# Patient Record
Sex: FEMALE | Race: WHITE | Employment: OTHER | URBAN - METROPOLITAN AREA
[De-identification: names, ages, dates, MRNs, and addresses within clinical notes are randomized per-mention and may not be internally consistent; named-entity substitution may affect disease eponyms.]

---

## 2017-08-16 ENCOUNTER — HOSPITAL ENCOUNTER (INPATIENT)
Age: 77
LOS: 26 days | Discharge: NURSING, REHAB OR LONG-TERM CARE | DRG: 025 | End: 2017-09-11
Attending: EMERGENCY MEDICINE | Admitting: INTERNAL MEDICINE
Payer: MEDICARE

## 2017-08-16 ENCOUNTER — APPOINTMENT (OUTPATIENT)
Dept: GENERAL RADIOLOGY | Age: 77
DRG: 025 | End: 2017-08-16
Payer: MEDICARE

## 2017-08-16 ENCOUNTER — APPOINTMENT (OUTPATIENT)
Dept: CT IMAGING | Age: 77
DRG: 025 | End: 2017-08-16
Payer: MEDICARE

## 2017-08-16 DIAGNOSIS — S06.5XAA SUBDURAL HEMATOMA: Primary | ICD-10-CM

## 2017-08-16 LAB
ACT TEG: 97 SEC (ref 86–118)
ALLEN TEST: ABNORMAL
ANGLE, RAPID TEG: 80.5 DEG (ref 64–80)
ANION GAP SERPL CALCULATED.3IONS-SCNC: 13 MMOL/L (ref 9–17)
BLOOD BANK SPECIMEN: ABNORMAL
BUN BLDV-MCNC: 20 MG/DL (ref 8–23)
CARBOXYHEMOGLOBIN: 0.9 % (ref 0–5)
CHLORIDE BLD-SCNC: 97 MMOL/L (ref 98–107)
CO2: 26 MMOL/L (ref 20–31)
COLLAGEN ADENOSINE-5'-DIPHOSPHATE (ADP) TIME: 109 SEC (ref 67–112)
COLLAGEN EPINEPHRINE TIME: 152 SEC (ref 85–172)
CREAT SERPL-MCNC: 0.91 MG/DL (ref 0.5–0.9)
EPL-TEG: 0 % (ref 0–15)
ETHANOL PERCENT: <0.01 %
ETHANOL: <10 MG/DL
FIO2: ABNORMAL
GFR AFRICAN AMERICAN: >60 ML/MIN
GFR NON-AFRICAN AMERICAN: >60 ML/MIN
GFR SERPL CREATININE-BSD FRML MDRD: ABNORMAL ML/MIN/{1.73_M2}
GFR SERPL CREATININE-BSD FRML MDRD: ABNORMAL ML/MIN/{1.73_M2}
GLUCOSE BLD-MCNC: 131 MG/DL (ref 70–99)
HCO3 VENOUS: 26.9 MMOL/L (ref 24–30)
HCT VFR BLD CALC: 35.3 % (ref 36–46)
HEMOGLOBIN: 11.8 G/DL (ref 12–16)
HEPARIN THERAPY: ABNORMAL
INR BLD: 1.1
KINETICS RAPID TEG: 0.8 MIN (ref 1–2)
LY30 (LYSIS) TEG: 0 % (ref 0–8)
MA(MAX CLOT) RAPID TEG: 76.5 MM (ref 52–71)
MCH RBC QN AUTO: 32.2 PG (ref 26–34)
MCHC RBC AUTO-ENTMCNC: 33.5 G/DL (ref 31–37)
MCV RBC AUTO: 96 FL (ref 80–100)
METHEMOGLOBIN: ABNORMAL % (ref 0–1.5)
MODE: ABNORMAL
NEGATIVE BASE EXCESS, VEN: ABNORMAL MMOL/L (ref 0–2)
NOTIFICATION TIME: ABNORMAL
NOTIFICATION: ABNORMAL
O2 DEVICE/FLOW/%: ABNORMAL
O2 SAT, VEN: 43.1 % (ref 60–85)
OXYHEMOGLOBIN: ABNORMAL % (ref 95–98)
PARTIAL THROMBOPLASTIN TIME: 25.4 SEC (ref 21.3–31.3)
PATIENT TEMP: 37
PCO2, VEN, TEMP ADJ: ABNORMAL MMHG (ref 39–55)
PCO2, VEN: 45.3 (ref 39–55)
PDW BLD-RTO: 16.5 % (ref 12.5–15.4)
PEEP/CPAP: ABNORMAL
PH VENOUS: 7.39 (ref 7.32–7.42)
PH, VEN, TEMP ADJ: ABNORMAL (ref 7.32–7.42)
PLATELET # BLD: 345 K/UL (ref 140–450)
PLATELET FUNCTION INTERP: NORMAL
PMV BLD AUTO: 9.1 FL (ref 6–12)
PO2, VEN, TEMP ADJ: ABNORMAL MMHG (ref 30–50)
PO2, VEN: 24.1 (ref 30–50)
POSITIVE BASE EXCESS, VEN: 2.1 MMOL/L (ref 0–2)
POTASSIUM SERPL-SCNC: 4.4 MMOL/L (ref 3.7–5.3)
PROTHROMBIN TIME: 11.5 SEC (ref 9.4–12.6)
PSV: ABNORMAL
PT. POSITION: ABNORMAL
RBC # BLD: 3.68 M/UL (ref 4–5.2)
REACTION TIME RAPID TEG: 0.5 MIN (ref 0–1)
RESPIRATORY RATE: ABNORMAL
SAMPLE SITE: ABNORMAL
SET RATE: ABNORMAL
SODIUM BLD-SCNC: 136 MMOL/L (ref 135–144)
TEG COMMENT: ABNORMAL
TEXT FOR RESPIRATORY: ABNORMAL
TOTAL HB: ABNORMAL G/DL (ref 12–16)
TOTAL RATE: ABNORMAL
VT: ABNORMAL
WBC # BLD: 14.1 K/UL (ref 3.5–11)

## 2017-08-16 PROCEDURE — 85210 CLOT FACTOR II PROTHROM SPEC: CPT

## 2017-08-16 PROCEDURE — 85610 PROTHROMBIN TIME: CPT

## 2017-08-16 PROCEDURE — C9132 KCENTRA, PER I.U.: HCPCS | Performed by: EMERGENCY MEDICINE

## 2017-08-16 PROCEDURE — 85576 BLOOD PLATELET AGGREGATION: CPT

## 2017-08-16 PROCEDURE — 85390 FIBRINOLYSINS SCREEN I&R: CPT

## 2017-08-16 PROCEDURE — 82805 BLOOD GASES W/O2 SATURATION: CPT

## 2017-08-16 PROCEDURE — 72128 CT CHEST SPINE W/O DYE: CPT

## 2017-08-16 PROCEDURE — 6360000002 HC RX W HCPCS: Performed by: EMERGENCY MEDICINE

## 2017-08-16 PROCEDURE — 86920 COMPATIBILITY TEST SPIN: CPT

## 2017-08-16 PROCEDURE — 72170 X-RAY EXAM OF PELVIS: CPT

## 2017-08-16 PROCEDURE — 2580000003 HC RX 258: Performed by: EMERGENCY MEDICINE

## 2017-08-16 PROCEDURE — 30283B1 TRANSFUSION OF NONAUTOLOGOUS 4-FACTOR PROTHROMBIN COMPLEX CONCENTRATE INTO VEIN, PERCUTANEOUS APPROACH: ICD-10-PCS | Performed by: PSYCHIATRY & NEUROLOGY

## 2017-08-16 PROCEDURE — 6360000002 HC RX W HCPCS: Performed by: STUDENT IN AN ORGANIZED HEALTH CARE EDUCATION/TRAINING PROGRAM

## 2017-08-16 PROCEDURE — 82947 ASSAY GLUCOSE BLOOD QUANT: CPT

## 2017-08-16 PROCEDURE — 86900 BLOOD TYPING SEROLOGIC ABO: CPT

## 2017-08-16 PROCEDURE — G0480 DRUG TEST DEF 1-7 CLASSES: HCPCS

## 2017-08-16 PROCEDURE — 2000000003 HC NEURO ICU R&B

## 2017-08-16 PROCEDURE — 85027 COMPLETE CBC AUTOMATED: CPT

## 2017-08-16 PROCEDURE — 86901 BLOOD TYPING SEROLOGIC RH(D): CPT

## 2017-08-16 PROCEDURE — 85730 THROMBOPLASTIN TIME PARTIAL: CPT

## 2017-08-16 PROCEDURE — 80051 ELECTROLYTE PANEL: CPT

## 2017-08-16 PROCEDURE — 72131 CT LUMBAR SPINE W/O DYE: CPT

## 2017-08-16 PROCEDURE — 2500000003 HC RX 250 WO HCPCS

## 2017-08-16 PROCEDURE — 84520 ASSAY OF UREA NITROGEN: CPT

## 2017-08-16 PROCEDURE — 99291 CRITICAL CARE FIRST HOUR: CPT

## 2017-08-16 PROCEDURE — 71010 XR CHEST PORTABLE: CPT

## 2017-08-16 PROCEDURE — 82565 ASSAY OF CREATININE: CPT

## 2017-08-16 PROCEDURE — 86850 RBC ANTIBODY SCREEN: CPT

## 2017-08-16 RX ORDER — SODIUM CHLORIDE 0.9 % (FLUSH) 0.9 %
10 SYRINGE (ML) INJECTION EVERY 12 HOURS SCHEDULED
Status: DISCONTINUED | OUTPATIENT
Start: 2017-08-16 | End: 2017-08-17

## 2017-08-16 RX ORDER — ZAFIRLUKAST 20 MG/1
20 TABLET, FILM COATED ORAL 2 TIMES DAILY
COMMUNITY
End: 2021-09-29

## 2017-08-16 RX ORDER — FENTANYL CITRATE 50 UG/ML
25 INJECTION, SOLUTION INTRAMUSCULAR; INTRAVENOUS ONCE
Status: COMPLETED | OUTPATIENT
Start: 2017-08-16 | End: 2017-08-16

## 2017-08-16 RX ORDER — FENTANYL CITRATE 50 UG/ML
25 INJECTION, SOLUTION INTRAMUSCULAR; INTRAVENOUS
Status: DISCONTINUED | OUTPATIENT
Start: 2017-08-16 | End: 2017-08-17

## 2017-08-16 RX ORDER — ONDANSETRON 2 MG/ML
4 INJECTION INTRAMUSCULAR; INTRAVENOUS EVERY 6 HOURS PRN
Status: DISCONTINUED | OUTPATIENT
Start: 2017-08-16 | End: 2017-09-11 | Stop reason: HOSPADM

## 2017-08-16 RX ORDER — CETIRIZINE HYDROCHLORIDE 10 MG/1
10 TABLET ORAL DAILY
COMMUNITY
End: 2017-09-27

## 2017-08-16 RX ORDER — ACETAMINOPHEN 325 MG/1
650 TABLET ORAL EVERY 4 HOURS PRN
Status: DISCONTINUED | OUTPATIENT
Start: 2017-08-16 | End: 2017-08-22

## 2017-08-16 RX ORDER — SODIUM CHLORIDE 0.9 % (FLUSH) 0.9 %
10 SYRINGE (ML) INJECTION PRN
Status: DISCONTINUED | OUTPATIENT
Start: 2017-08-16 | End: 2017-09-11 | Stop reason: HOSPADM

## 2017-08-16 RX ADMIN — PROTHROMBIN, COAGULATION FACTOR VII HUMAN, COAGULATION FACTOR IX HUMAN, COAGULATION FACTOR X HUMAN, PROTEIN C, PROTEIN S HUMAN, AND WATER 3410 UNITS: KIT at 21:28

## 2017-08-16 RX ADMIN — FENTANYL CITRATE 25 MCG: 50 INJECTION INTRAMUSCULAR; INTRAVENOUS at 21:12

## 2017-08-16 RX ADMIN — LEVETIRACETAM 1000 MG: 500 INJECTION, SOLUTION, CONCENTRATE INTRAVENOUS at 21:29

## 2017-08-16 ASSESSMENT — PAIN SCALES - GENERAL: PAINLEVEL_OUTOF10: 8

## 2017-08-17 ENCOUNTER — APPOINTMENT (OUTPATIENT)
Dept: CT IMAGING | Age: 77
DRG: 025 | End: 2017-08-17
Payer: MEDICARE

## 2017-08-17 ENCOUNTER — ANESTHESIA (OUTPATIENT)
Dept: OPERATING ROOM | Age: 77
DRG: 025 | End: 2017-08-17
Payer: MEDICARE

## 2017-08-17 ENCOUNTER — APPOINTMENT (OUTPATIENT)
Dept: GENERAL RADIOLOGY | Age: 77
DRG: 025 | End: 2017-08-17
Payer: MEDICARE

## 2017-08-17 ENCOUNTER — ANESTHESIA EVENT (OUTPATIENT)
Dept: OPERATING ROOM | Age: 77
DRG: 025 | End: 2017-08-17
Payer: MEDICARE

## 2017-08-17 VITALS — OXYGEN SATURATION: 78 % | TEMPERATURE: 99.2 F | SYSTOLIC BLOOD PRESSURE: 145 MMHG | DIASTOLIC BLOOD PRESSURE: 75 MMHG

## 2017-08-17 PROBLEM — M51.27 PROTRUSION OF INTERVERTEBRAL DISC OF LUMBOSACRAL REGION: Status: ACTIVE | Noted: 2017-08-17

## 2017-08-17 PROBLEM — S06.5XAA SUBDURAL HEMATOMA, ACUTE (HCC): Status: ACTIVE | Noted: 2017-08-17

## 2017-08-17 LAB
ABSOLUTE EOS #: 0 K/UL (ref 0–0.4)
ABSOLUTE LYMPH #: 1.3 K/UL (ref 1–4.8)
ABSOLUTE MONO #: 0.9 K/UL (ref 0.1–1.2)
ALBUMIN SERPL-MCNC: 3.4 G/DL (ref 3.5–5.2)
ALBUMIN/GLOBULIN RATIO: 1 (ref 1–2.5)
ALLEN TEST: POSITIVE
ALP BLD-CCNC: 90 U/L (ref 35–104)
ALT SERPL-CCNC: 14 U/L (ref 5–33)
ANGLE TEG: 76.8 DEG (ref 53–72)
ANION GAP SERPL CALCULATED.3IONS-SCNC: 16 MMOL/L (ref 9–17)
ANION GAP SERPL CALCULATED.3IONS-SCNC: 18 MMOL/L (ref 9–17)
AST SERPL-CCNC: 19 U/L
BASOPHILS # BLD: 0 %
BASOPHILS ABSOLUTE: 0 K/UL (ref 0–0.2)
BILIRUB SERPL-MCNC: 0.36 MG/DL (ref 0.3–1.2)
BLD PROD TYP BPU: NORMAL
BUN BLDV-MCNC: 14 MG/DL (ref 8–23)
BUN BLDV-MCNC: 17 MG/DL (ref 8–23)
BUN/CREAT BLD: ABNORMAL (ref 9–20)
BUN/CREAT BLD: ABNORMAL (ref 9–20)
CALCIUM SERPL-MCNC: 8.8 MG/DL (ref 8.6–10.4)
CALCIUM SERPL-MCNC: 9.1 MG/DL (ref 8.6–10.4)
CHLORIDE BLD-SCNC: 93 MMOL/L (ref 98–107)
CHLORIDE BLD-SCNC: 97 MMOL/L (ref 98–107)
CO2: 24 MMOL/L (ref 20–31)
CO2: 26 MMOL/L (ref 20–31)
CREAT SERPL-MCNC: 0.84 MG/DL (ref 0.5–0.9)
CREAT SERPL-MCNC: 0.84 MG/DL (ref 0.5–0.9)
DIFFERENTIAL TYPE: ABNORMAL
DISPENSE STATUS BLOOD BANK: NORMAL
EOSINOPHILS RELATIVE PERCENT: 0 %
EPL-TEG: 0 % (ref 0–15)
FIO2: 50
GFR AFRICAN AMERICAN: >60 ML/MIN
GFR AFRICAN AMERICAN: >60 ML/MIN
GFR NON-AFRICAN AMERICAN: >60 ML/MIN
GFR NON-AFRICAN AMERICAN: >60 ML/MIN
GFR SERPL CREATININE-BSD FRML MDRD: ABNORMAL ML/MIN/{1.73_M2}
GLUCOSE BLD-MCNC: 125 MG/DL (ref 70–99)
GLUCOSE BLD-MCNC: 156 MG/DL (ref 65–105)
GLUCOSE BLD-MCNC: 160 MG/DL (ref 65–105)
GLUCOSE BLD-MCNC: 177 MG/DL (ref 65–105)
GLUCOSE BLD-MCNC: 192 MG/DL (ref 70–99)
HCT VFR BLD CALC: 34.2 % (ref 36–46)
HEMOGLOBIN: 11.3 G/DL (ref 12–16)
HEPARIN THERAPY: ABNORMAL
INR BLD: 1
INR BLD: 1
KINETICS TEG: 0.9 MIN (ref 1–3)
LY30 (LYSIS) TEG: 0 % (ref 0–8)
LYMPHOCYTES # BLD: 11 %
MA (MAX CLOT) TEG: 81.1 MM (ref 50–70)
MCH RBC QN AUTO: 31.8 PG (ref 26–34)
MCHC RBC AUTO-ENTMCNC: 33 G/DL (ref 31–37)
MCV RBC AUTO: 96.4 FL (ref 80–100)
MODE: ABNORMAL
MONOCYTES # BLD: 8 %
NEGATIVE BASE EXCESS, ART: 2 (ref 0–2)
O2 DEVICE/FLOW/%: ABNORMAL
PARTIAL THROMBOPLASTIN TIME: 22.3 SEC (ref 21.3–31.3)
PARTIAL THROMBOPLASTIN TIME: 22.6 SEC (ref 21.3–31.3)
PATIENT TEMP: ABNORMAL
PDW BLD-RTO: 16.5 % (ref 12.5–15.4)
PERFORMING LOCATION: ABNORMAL
PLATELET # BLD: 297 K/UL (ref 140–450)
PLATELET ESTIMATE: ABNORMAL
PMV BLD AUTO: 8.6 FL (ref 6–12)
POC HCO3: 23 MMOL/L (ref 21–28)
POC O2 SATURATION: 96 % (ref 94–98)
POC PCO2 TEMP: ABNORMAL MM HG
POC PCO2: 39.7 MM HG (ref 35–48)
POC PH TEMP: ABNORMAL
POC PH: 7.37 (ref 7.35–7.45)
POC PO2 TEMP: ABNORMAL MM HG
POC PO2: 81.7 MM HG (ref 83–108)
POSITIVE BASE EXCESS, ART: ABNORMAL (ref 0–3)
POTASSIUM SERPL-SCNC: 4.2 MMOL/L (ref 3.7–5.3)
POTASSIUM SERPL-SCNC: 5 MMOL/L (ref 3.7–5.3)
PROTHROMBIN TIME: 10.5 SEC (ref 9.4–12.6)
PROTHROMBIN TIME: 10.6 SEC (ref 9.4–12.6)
RBC # BLD: 3.55 M/UL (ref 4–5.2)
RBC # BLD: ABNORMAL 10*6/UL
REACTION TIME TEG: 4.7 MIN (ref 5–10)
SAMPLE SITE: ABNORMAL
SEG NEUTROPHILS: 81 %
SEGMENTED NEUTROPHILS ABSOLUTE COUNT: 9.4 K/UL (ref 1.8–7.7)
SERUM OSMOLALITY: 302 MOSM/KG (ref 275–295)
SODIUM BLD-SCNC: 135 MMOL/L (ref 135–144)
SODIUM BLD-SCNC: 139 MMOL/L (ref 135–144)
TCO2 (CALC), ART: 24 MMOL/L (ref 22–29)
TEG COMMENT: ABNORMAL
TOTAL PROTEIN: 6.8 G/DL (ref 6.4–8.3)
TRANSFUSION STATUS: NORMAL
TROPONIN INTERP: ABNORMAL
TROPONIN T: 0.09 NG/ML
UNIT DIVISION: 0
UNIT NUMBER: NORMAL
WBC # BLD: 11.6 K/UL (ref 3.5–11)
WBC # BLD: ABNORMAL 10*3/UL

## 2017-08-17 PROCEDURE — 3600000014 HC SURGERY LEVEL 4 ADDTL 15MIN: Performed by: NEUROLOGICAL SURGERY

## 2017-08-17 PROCEDURE — 80053 COMPREHEN METABOLIC PANEL: CPT

## 2017-08-17 PROCEDURE — 85730 THROMBOPLASTIN TIME PARTIAL: CPT

## 2017-08-17 PROCEDURE — A6258 TRANSPARENT FILM >16<=48 IN: HCPCS | Performed by: NEUROLOGICAL SURGERY

## 2017-08-17 PROCEDURE — 99232 SBSQ HOSP IP/OBS MODERATE 35: CPT | Performed by: NEUROLOGICAL SURGERY

## 2017-08-17 PROCEDURE — 2580000003 HC RX 258: Performed by: EMERGENCY MEDICINE

## 2017-08-17 PROCEDURE — 2000000003 HC NEURO ICU R&B

## 2017-08-17 PROCEDURE — 2580000003 HC RX 258: Performed by: STUDENT IN AN ORGANIZED HEALTH CARE EDUCATION/TRAINING PROGRAM

## 2017-08-17 PROCEDURE — 85610 PROTHROMBIN TIME: CPT

## 2017-08-17 PROCEDURE — 61312 CRNEC/CRNOT STTL XDRL/SDRL: CPT | Performed by: NEUROLOGICAL SURGERY

## 2017-08-17 PROCEDURE — 87086 URINE CULTURE/COLONY COUNT: CPT

## 2017-08-17 PROCEDURE — 94660 CPAP INITIATION&MGMT: CPT

## 2017-08-17 PROCEDURE — 6360000002 HC RX W HCPCS: Performed by: ANESTHESIOLOGY

## 2017-08-17 PROCEDURE — 2500000003 HC RX 250 WO HCPCS: Performed by: STUDENT IN AN ORGANIZED HEALTH CARE EDUCATION/TRAINING PROGRAM

## 2017-08-17 PROCEDURE — 71010 XR CHEST PORTABLE: CPT

## 2017-08-17 PROCEDURE — 3600000004 HC SURGERY LEVEL 4 BASE: Performed by: NEUROLOGICAL SURGERY

## 2017-08-17 PROCEDURE — 93970 EXTREMITY STUDY: CPT

## 2017-08-17 PROCEDURE — 85390 FIBRINOLYSINS SCREEN I&R: CPT

## 2017-08-17 PROCEDURE — 2580000003 HC RX 258: Performed by: NURSE ANESTHETIST, CERTIFIED REGISTERED

## 2017-08-17 PROCEDURE — 6360000002 HC RX W HCPCS: Performed by: NURSE ANESTHETIST, CERTIFIED REGISTERED

## 2017-08-17 PROCEDURE — 7100000000 HC PACU RECOVERY - FIRST 15 MIN: Performed by: NEUROLOGICAL SURGERY

## 2017-08-17 PROCEDURE — 6360000002 HC RX W HCPCS: Performed by: EMERGENCY MEDICINE

## 2017-08-17 PROCEDURE — 2580000003 HC RX 258: Performed by: NEUROLOGICAL SURGERY

## 2017-08-17 PROCEDURE — 36415 COLL VENOUS BLD VENIPUNCTURE: CPT

## 2017-08-17 PROCEDURE — 85347 COAGULATION TIME ACTIVATED: CPT

## 2017-08-17 PROCEDURE — 84484 ASSAY OF TROPONIN QUANT: CPT

## 2017-08-17 PROCEDURE — 6360000002 HC RX W HCPCS: Performed by: STUDENT IN AN ORGANIZED HEALTH CARE EDUCATION/TRAINING PROGRAM

## 2017-08-17 PROCEDURE — 2500000003 HC RX 250 WO HCPCS: Performed by: NURSE ANESTHETIST, CERTIFIED REGISTERED

## 2017-08-17 PROCEDURE — 83930 ASSAY OF BLOOD OSMOLALITY: CPT

## 2017-08-17 PROCEDURE — 2500000003 HC RX 250 WO HCPCS: Performed by: EMERGENCY MEDICINE

## 2017-08-17 PROCEDURE — 2500000003 HC RX 250 WO HCPCS

## 2017-08-17 PROCEDURE — 3700000001 HC ADD 15 MINUTES (ANESTHESIA): Performed by: NEUROLOGICAL SURGERY

## 2017-08-17 PROCEDURE — 85576 BLOOD PLATELET AGGREGATION: CPT

## 2017-08-17 PROCEDURE — C1729 CATH, DRAINAGE: HCPCS | Performed by: NEUROLOGICAL SURGERY

## 2017-08-17 PROCEDURE — 3700000000 HC ANESTHESIA ATTENDED CARE: Performed by: NEUROLOGICAL SURGERY

## 2017-08-17 PROCEDURE — 99291 CRITICAL CARE FIRST HOUR: CPT | Performed by: PSYCHIATRY & NEUROLOGY

## 2017-08-17 PROCEDURE — 70450 CT HEAD/BRAIN W/O DYE: CPT

## 2017-08-17 PROCEDURE — 82803 BLOOD GASES ANY COMBINATION: CPT

## 2017-08-17 PROCEDURE — C1713 ANCHOR/SCREW BN/BN,TIS/BN: HCPCS | Performed by: NEUROLOGICAL SURGERY

## 2017-08-17 PROCEDURE — 85384 FIBRINOGEN ACTIVITY: CPT

## 2017-08-17 PROCEDURE — 6370000000 HC RX 637 (ALT 250 FOR IP): Performed by: NEUROLOGICAL SURGERY

## 2017-08-17 PROCEDURE — 82947 ASSAY GLUCOSE BLOOD QUANT: CPT

## 2017-08-17 PROCEDURE — 85025 COMPLETE CBC W/AUTO DIFF WBC: CPT

## 2017-08-17 PROCEDURE — 6360000002 HC RX W HCPCS

## 2017-08-17 PROCEDURE — 80048 BASIC METABOLIC PNL TOTAL CA: CPT

## 2017-08-17 PROCEDURE — 00C40ZZ EXTIRPATION OF MATTER FROM INTRACRANIAL SUBDURAL SPACE, OPEN APPROACH: ICD-10-PCS | Performed by: NEUROLOGICAL SURGERY

## 2017-08-17 PROCEDURE — 2500000003 HC RX 250 WO HCPCS: Performed by: NEUROLOGICAL SURGERY

## 2017-08-17 PROCEDURE — 7100000001 HC PACU RECOVERY - ADDTL 15 MIN: Performed by: NEUROLOGICAL SURGERY

## 2017-08-17 PROCEDURE — A6257 TRANSPARENT FILM <= 16 SQ IN: HCPCS | Performed by: NEUROLOGICAL SURGERY

## 2017-08-17 PROCEDURE — S0028 INJECTION, FAMOTIDINE, 20 MG: HCPCS | Performed by: STUDENT IN AN ORGANIZED HEALTH CARE EDUCATION/TRAINING PROGRAM

## 2017-08-17 DEVICE — NEURO SCREWS, CROSS-PIN, SELF-DRILLING: Type: IMPLANTABLE DEVICE | Status: FUNCTIONAL

## 2017-08-17 DEVICE — CRANIOFIX 2 TITANIUM CLAMP 11MM
Type: IMPLANTABLE DEVICE | Status: FUNCTIONAL
Brand: CRANIOFIX2

## 2017-08-17 DEVICE — BURR HOLE COVER PLATE WITH TAB, 14MM
Type: IMPLANTABLE DEVICE | Status: FUNCTIONAL
Brand: UNIVERSAL NEURO 2

## 2017-08-17 RX ORDER — LIDOCAINE HYDROCHLORIDE 10 MG/ML
1 INJECTION, SOLUTION EPIDURAL; INFILTRATION; INTRACAUDAL; PERINEURAL
Status: DISCONTINUED | OUTPATIENT
Start: 2017-08-17 | End: 2017-08-17

## 2017-08-17 RX ORDER — HYDRALAZINE HYDROCHLORIDE 20 MG/ML
5 INJECTION INTRAMUSCULAR; INTRAVENOUS EVERY 10 MIN PRN
Status: DISCONTINUED | OUTPATIENT
Start: 2017-08-17 | End: 2017-08-17

## 2017-08-17 RX ORDER — NALOXONE HYDROCHLORIDE 0.4 MG/ML
INJECTION, SOLUTION INTRAMUSCULAR; INTRAVENOUS; SUBCUTANEOUS
Status: DISPENSED
Start: 2017-08-17 | End: 2017-08-17

## 2017-08-17 RX ORDER — NICOTINE POLACRILEX 4 MG
15 LOZENGE BUCCAL PRN
Status: DISCONTINUED | OUTPATIENT
Start: 2017-08-17 | End: 2017-09-11

## 2017-08-17 RX ORDER — FENTANYL CITRATE 50 UG/ML
25 INJECTION, SOLUTION INTRAMUSCULAR; INTRAVENOUS EVERY 5 MIN PRN
Status: DISCONTINUED | OUTPATIENT
Start: 2017-08-17 | End: 2017-08-17

## 2017-08-17 RX ORDER — PROPOFOL 10 MG/ML
INJECTION, EMULSION INTRAVENOUS PRN
Status: DISCONTINUED | OUTPATIENT
Start: 2017-08-17 | End: 2017-08-17 | Stop reason: SDUPTHER

## 2017-08-17 RX ORDER — DIPHENHYDRAMINE HYDROCHLORIDE 50 MG/ML
25 INJECTION INTRAMUSCULAR; INTRAVENOUS ONCE
Status: COMPLETED | OUTPATIENT
Start: 2017-08-17 | End: 2017-08-17

## 2017-08-17 RX ORDER — PROMETHAZINE HYDROCHLORIDE 25 MG/ML
12.5 INJECTION, SOLUTION INTRAMUSCULAR; INTRAVENOUS ONCE
Status: COMPLETED | OUTPATIENT
Start: 2017-08-17 | End: 2017-08-17

## 2017-08-17 RX ORDER — DEXTROSE MONOHYDRATE 50 MG/ML
100 INJECTION, SOLUTION INTRAVENOUS PRN
Status: DISCONTINUED | OUTPATIENT
Start: 2017-08-17 | End: 2017-09-11

## 2017-08-17 RX ORDER — DEXTROSE MONOHYDRATE 25 G/50ML
12.5 INJECTION, SOLUTION INTRAVENOUS PRN
Status: DISCONTINUED | OUTPATIENT
Start: 2017-08-17 | End: 2017-09-11

## 2017-08-17 RX ORDER — SODIUM CHLORIDE, SODIUM LACTATE, POTASSIUM CHLORIDE, CALCIUM CHLORIDE 600; 310; 30; 20 MG/100ML; MG/100ML; MG/100ML; MG/100ML
INJECTION, SOLUTION INTRAVENOUS CONTINUOUS
Status: DISCONTINUED | OUTPATIENT
Start: 2017-08-17 | End: 2017-08-17

## 2017-08-17 RX ORDER — LABETALOL HYDROCHLORIDE 5 MG/ML
INJECTION, SOLUTION INTRAVENOUS
Status: COMPLETED
Start: 2017-08-17 | End: 2017-08-17

## 2017-08-17 RX ORDER — GLYCOPYRROLATE 0.2 MG/ML
INJECTION INTRAMUSCULAR; INTRAVENOUS PRN
Status: DISCONTINUED | OUTPATIENT
Start: 2017-08-17 | End: 2017-08-17 | Stop reason: SDUPTHER

## 2017-08-17 RX ORDER — ONDANSETRON 2 MG/ML
4 INJECTION INTRAMUSCULAR; INTRAVENOUS DAILY PRN
Status: DISCONTINUED | OUTPATIENT
Start: 2017-08-17 | End: 2017-08-17

## 2017-08-17 RX ORDER — HYDROCHLOROTHIAZIDE 12.5 MG/1
12.5 CAPSULE, GELATIN COATED ORAL DAILY
Status: DISCONTINUED | OUTPATIENT
Start: 2017-08-17 | End: 2017-08-21

## 2017-08-17 RX ORDER — OXYCODONE HYDROCHLORIDE AND ACETAMINOPHEN 5; 325 MG/1; MG/1
1 TABLET ORAL EVERY 4 HOURS PRN
Status: DISCONTINUED | OUTPATIENT
Start: 2017-08-17 | End: 2017-08-17

## 2017-08-17 RX ORDER — SODIUM CHLORIDE 9 MG/ML
INJECTION, SOLUTION INTRAVENOUS CONTINUOUS
Status: DISCONTINUED | OUTPATIENT
Start: 2017-08-17 | End: 2017-08-17

## 2017-08-17 RX ORDER — FENTANYL CITRATE 50 UG/ML
INJECTION, SOLUTION INTRAMUSCULAR; INTRAVENOUS
Status: COMPLETED
Start: 2017-08-17 | End: 2017-08-17

## 2017-08-17 RX ORDER — FENTANYL CITRATE 50 UG/ML
50 INJECTION, SOLUTION INTRAMUSCULAR; INTRAVENOUS EVERY 5 MIN PRN
Status: DISCONTINUED | OUTPATIENT
Start: 2017-08-17 | End: 2017-08-17

## 2017-08-17 RX ORDER — MAGNESIUM HYDROXIDE 1200 MG/15ML
LIQUID ORAL CONTINUOUS PRN
Status: DISCONTINUED | OUTPATIENT
Start: 2017-08-17 | End: 2017-08-17 | Stop reason: HOSPADM

## 2017-08-17 RX ORDER — SODIUM CHLORIDE 9 MG/ML
INJECTION, SOLUTION INTRAVENOUS CONTINUOUS
Status: DISCONTINUED | OUTPATIENT
Start: 2017-08-17 | End: 2017-08-30

## 2017-08-17 RX ORDER — SUCCINYLCHOLINE CHLORIDE 20 MG/ML
INJECTION INTRAMUSCULAR; INTRAVENOUS PRN
Status: DISCONTINUED | OUTPATIENT
Start: 2017-08-17 | End: 2017-08-17 | Stop reason: SDUPTHER

## 2017-08-17 RX ORDER — FENTANYL CITRATE 50 UG/ML
INJECTION, SOLUTION INTRAMUSCULAR; INTRAVENOUS PRN
Status: DISCONTINUED | OUTPATIENT
Start: 2017-08-17 | End: 2017-08-17 | Stop reason: SDUPTHER

## 2017-08-17 RX ORDER — ALLOPURINOL 300 MG/1
300 TABLET ORAL DAILY
Status: DISCONTINUED | OUTPATIENT
Start: 2017-08-17 | End: 2017-09-11 | Stop reason: HOSPADM

## 2017-08-17 RX ORDER — MIDAZOLAM HYDROCHLORIDE 1 MG/ML
1 INJECTION INTRAMUSCULAR; INTRAVENOUS EVERY 10 MIN PRN
Status: DISCONTINUED | OUTPATIENT
Start: 2017-08-17 | End: 2017-08-17

## 2017-08-17 RX ORDER — SODIUM CHLORIDE 9 MG/ML
INJECTION, SOLUTION INTRAVENOUS CONTINUOUS PRN
Status: DISCONTINUED | OUTPATIENT
Start: 2017-08-17 | End: 2017-08-17 | Stop reason: SDUPTHER

## 2017-08-17 RX ORDER — NEOSTIGMINE METHYLSULFATE 1 MG/ML
INJECTION, SOLUTION INTRAVENOUS PRN
Status: DISCONTINUED | OUTPATIENT
Start: 2017-08-17 | End: 2017-08-17 | Stop reason: SDUPTHER

## 2017-08-17 RX ORDER — MANNITOL 20 G/100ML
40 INJECTION, SOLUTION INTRAVENOUS ONCE
Status: COMPLETED | OUTPATIENT
Start: 2017-08-17 | End: 2017-08-17

## 2017-08-17 RX ORDER — FENTANYL CITRATE 50 UG/ML
50 INJECTION, SOLUTION INTRAMUSCULAR; INTRAVENOUS
Status: DISCONTINUED | OUTPATIENT
Start: 2017-08-17 | End: 2017-08-30

## 2017-08-17 RX ORDER — DIPHENHYDRAMINE HYDROCHLORIDE 50 MG/ML
12.5 INJECTION INTRAMUSCULAR; INTRAVENOUS
Status: DISCONTINUED | OUTPATIENT
Start: 2017-08-17 | End: 2017-08-17

## 2017-08-17 RX ORDER — SCOLOPAMINE TRANSDERMAL SYSTEM 1 MG/1
1 PATCH, EXTENDED RELEASE TRANSDERMAL ONCE
Status: DISCONTINUED | OUTPATIENT
Start: 2017-08-17 | End: 2017-08-17

## 2017-08-17 RX ORDER — ROCURONIUM BROMIDE 10 MG/ML
INJECTION, SOLUTION INTRAVENOUS PRN
Status: DISCONTINUED | OUTPATIENT
Start: 2017-08-17 | End: 2017-08-17 | Stop reason: SDUPTHER

## 2017-08-17 RX ORDER — ALBUTEROL SULFATE 2.5 MG/3ML
SOLUTION RESPIRATORY (INHALATION)
Status: DISPENSED
Start: 2017-08-17 | End: 2017-08-17

## 2017-08-17 RX ORDER — ALBUTEROL SULFATE 2.5 MG/3ML
2.5 SOLUTION RESPIRATORY (INHALATION) ONCE
Status: COMPLETED | OUTPATIENT
Start: 2017-08-17 | End: 2017-08-17

## 2017-08-17 RX ORDER — METOCLOPRAMIDE HYDROCHLORIDE 5 MG/ML
10 INJECTION INTRAMUSCULAR; INTRAVENOUS ONCE
Status: DISCONTINUED | OUTPATIENT
Start: 2017-08-17 | End: 2017-08-17

## 2017-08-17 RX ORDER — ONDANSETRON 2 MG/ML
4 INJECTION INTRAMUSCULAR; INTRAVENOUS
Status: DISCONTINUED | OUTPATIENT
Start: 2017-08-17 | End: 2017-08-17

## 2017-08-17 RX ORDER — LIDOCAINE HYDROCHLORIDE 10 MG/ML
INJECTION, SOLUTION EPIDURAL; INFILTRATION; INTRACAUDAL; PERINEURAL PRN
Status: DISCONTINUED | OUTPATIENT
Start: 2017-08-17 | End: 2017-08-17 | Stop reason: SDUPTHER

## 2017-08-17 RX ORDER — LABETALOL HYDROCHLORIDE 5 MG/ML
5 INJECTION, SOLUTION INTRAVENOUS EVERY 10 MIN PRN
Status: DISCONTINUED | OUTPATIENT
Start: 2017-08-17 | End: 2017-08-17

## 2017-08-17 RX ORDER — ZAFIRLUKAST 20 MG/1
20 TABLET, FILM COATED ORAL 2 TIMES DAILY
Status: DISCONTINUED | OUTPATIENT
Start: 2017-08-17 | End: 2017-09-11 | Stop reason: HOSPADM

## 2017-08-17 RX ORDER — VANCOMYCIN HYDROCHLORIDE 1 G/200ML
INJECTION, SOLUTION INTRAVENOUS PRN
Status: DISCONTINUED | OUTPATIENT
Start: 2017-08-17 | End: 2017-08-17 | Stop reason: SDUPTHER

## 2017-08-17 RX ORDER — NALOXONE HYDROCHLORIDE 0.4 MG/ML
80 INJECTION, SOLUTION INTRAMUSCULAR; INTRAVENOUS; SUBCUTANEOUS ONCE
Status: COMPLETED | OUTPATIENT
Start: 2017-08-17 | End: 2017-08-17

## 2017-08-17 RX ORDER — SPIRONOLACTONE 25 MG/1
50 TABLET ORAL 2 TIMES DAILY
Status: DISCONTINUED | OUTPATIENT
Start: 2017-08-17 | End: 2017-08-22

## 2017-08-17 RX ORDER — ATENOLOL 25 MG/1
25 TABLET ORAL DAILY
Status: DISCONTINUED | OUTPATIENT
Start: 2017-08-17 | End: 2017-08-22

## 2017-08-17 RX ADMIN — ONDANSETRON 4 MG: 2 INJECTION INTRAMUSCULAR; INTRAVENOUS at 01:37

## 2017-08-17 RX ADMIN — ALBUTEROL SULFATE 2.5 MG: 2.5 SOLUTION RESPIRATORY (INHALATION) at 10:27

## 2017-08-17 RX ADMIN — FENTANYL CITRATE 50 MCG: 50 INJECTION INTRAMUSCULAR; INTRAVENOUS at 06:14

## 2017-08-17 RX ADMIN — PHENYLEPHRINE HYDROCHLORIDE 100 MCG: 10 INJECTION INTRAMUSCULAR; INTRAVENOUS; SUBCUTANEOUS at 08:23

## 2017-08-17 RX ADMIN — LABETALOL HYDROCHLORIDE 5 MG: 5 INJECTION, SOLUTION INTRAVENOUS at 10:42

## 2017-08-17 RX ADMIN — PROMETHAZINE HYDROCHLORIDE 12.5 MG: 25 INJECTION, SOLUTION INTRAMUSCULAR; INTRAVENOUS at 02:21

## 2017-08-17 RX ADMIN — FENTANYL CITRATE 25 MCG: 50 INJECTION INTRAMUSCULAR; INTRAVENOUS at 03:52

## 2017-08-17 RX ADMIN — SUCCINYLCHOLINE CHLORIDE 120 MG: 20 INJECTION, SOLUTION INTRAMUSCULAR; INTRAVENOUS at 08:18

## 2017-08-17 RX ADMIN — NEOSTIGMINE METHYLSULFATE 4 MG: 1 INJECTION INTRAVENOUS at 09:28

## 2017-08-17 RX ADMIN — PHENYLEPHRINE HYDROCHLORIDE 100 MCG: 10 INJECTION INTRAMUSCULAR; INTRAVENOUS; SUBCUTANEOUS at 08:20

## 2017-08-17 RX ADMIN — VANCOMYCIN HYDROCHLORIDE 1 G: 1 INJECTION, SOLUTION INTRAVENOUS at 08:13

## 2017-08-17 RX ADMIN — FENTANYL CITRATE 50 MCG: 50 INJECTION INTRAMUSCULAR; INTRAVENOUS at 04:52

## 2017-08-17 RX ADMIN — FAMOTIDINE 20 MG: 10 INJECTION, SOLUTION INTRAVENOUS at 00:35

## 2017-08-17 RX ADMIN — FENTANYL CITRATE 25 MCG: 50 INJECTION INTRAMUSCULAR; INTRAVENOUS at 01:34

## 2017-08-17 RX ADMIN — PHENYLEPHRINE HYDROCHLORIDE 200 MCG: 10 INJECTION INTRAMUSCULAR; INTRAVENOUS; SUBCUTANEOUS at 08:29

## 2017-08-17 RX ADMIN — SODIUM CHLORIDE: 9 INJECTION, SOLUTION INTRAVENOUS at 09:40

## 2017-08-17 RX ADMIN — LEVETIRACETAM 500 MG: 100 INJECTION, SOLUTION INTRAVENOUS at 18:03

## 2017-08-17 RX ADMIN — FENTANYL CITRATE 50 MCG: 50 INJECTION INTRAMUSCULAR; INTRAVENOUS at 09:10

## 2017-08-17 RX ADMIN — LIDOCAINE HYDROCHLORIDE 50 MG: 10 INJECTION, SOLUTION EPIDURAL; INFILTRATION; INTRACAUDAL; PERINEURAL at 08:18

## 2017-08-17 RX ADMIN — FENTANYL CITRATE 50 MCG: 50 INJECTION INTRAMUSCULAR; INTRAVENOUS at 08:18

## 2017-08-17 RX ADMIN — GLYCOPYRROLATE 0.6 MG: 0.2 INJECTION INTRAMUSCULAR; INTRAVENOUS at 09:28

## 2017-08-17 RX ADMIN — PHENYLEPHRINE HYDROCHLORIDE 100 MCG: 10 INJECTION INTRAMUSCULAR; INTRAVENOUS; SUBCUTANEOUS at 08:27

## 2017-08-17 RX ADMIN — SODIUM CHLORIDE: 9 INJECTION, SOLUTION INTRAVENOUS at 13:27

## 2017-08-17 RX ADMIN — DIPHENHYDRAMINE HYDROCHLORIDE 25 MG: 50 INJECTION, SOLUTION INTRAMUSCULAR; INTRAVENOUS at 01:56

## 2017-08-17 RX ADMIN — MIDAZOLAM HYDROCHLORIDE 1 MG: 1 INJECTION, SOLUTION INTRAMUSCULAR; INTRAVENOUS at 07:44

## 2017-08-17 RX ADMIN — PHENYLEPHRINE HYDROCHLORIDE 100 MCG: 10 INJECTION INTRAMUSCULAR; INTRAVENOUS; SUBCUTANEOUS at 08:34

## 2017-08-17 RX ADMIN — MANNITOL 40 G: 20 INJECTION, SOLUTION INTRAVENOUS at 04:15

## 2017-08-17 RX ADMIN — ROCURONIUM BROMIDE 5 MG: 10 INJECTION INTRAVENOUS at 08:18

## 2017-08-17 RX ADMIN — FAMOTIDINE 20 MG: 10 INJECTION, SOLUTION INTRAVENOUS at 21:56

## 2017-08-17 RX ADMIN — FENTANYL CITRATE 25 MCG: 50 INJECTION INTRAMUSCULAR; INTRAVENOUS at 00:21

## 2017-08-17 RX ADMIN — PROPOFOL 140 MG: 10 INJECTION, EMULSION INTRAVENOUS at 08:18

## 2017-08-17 RX ADMIN — NICARDIPINE HYDROCHLORIDE 2.5 MG/HR: 0.1 INJECTION, SOLUTION INTRAVENOUS at 02:50

## 2017-08-17 RX ADMIN — SODIUM CHLORIDE: 9 INJECTION, SOLUTION INTRAVENOUS at 08:35

## 2017-08-17 RX ADMIN — PHENYLEPHRINE HYDROCHLORIDE 1 MCG/MIN: 10 INJECTION INTRAMUSCULAR; INTRAVENOUS; SUBCUTANEOUS at 08:40

## 2017-08-17 RX ADMIN — LEVETIRACETAM 750 MG: 500 INJECTION, SOLUTION, CONCENTRATE INTRAVENOUS at 22:20

## 2017-08-17 RX ADMIN — SODIUM CHLORIDE, PRESERVATIVE FREE 10 ML: 5 INJECTION INTRAVENOUS at 00:26

## 2017-08-17 RX ADMIN — NALOXONE HYDROCHLORIDE 80 MCG: 0.4 INJECTION, SOLUTION INTRAMUSCULAR; INTRAVENOUS; SUBCUTANEOUS at 10:23

## 2017-08-17 RX ADMIN — SODIUM CHLORIDE: 9 INJECTION, SOLUTION INTRAVENOUS at 00:51

## 2017-08-17 RX ADMIN — ROCURONIUM BROMIDE 45 MG: 10 INJECTION INTRAVENOUS at 08:30

## 2017-08-17 RX ADMIN — FENTANYL CITRATE 50 MCG: 50 INJECTION INTRAMUSCULAR; INTRAVENOUS at 09:06

## 2017-08-17 ASSESSMENT — PULMONARY FUNCTION TESTS
PIF_VALUE: 11
PIF_VALUE: 8
PIF_VALUE: 10
PIF_VALUE: 12
PIF_VALUE: 12
PIF_VALUE: 10
PIF_VALUE: 10
PIF_VALUE: 11
PIF_VALUE: 10
PIF_VALUE: 10
PIF_VALUE: 11
PIF_VALUE: 11
PIF_VALUE: 9
PIF_VALUE: 10
PIF_VALUE: 11

## 2017-08-17 ASSESSMENT — PAIN SCALES - GENERAL
PAINLEVEL_OUTOF10: 0
PAINLEVEL_OUTOF10: 8
PAINLEVEL_OUTOF10: 7
PAINLEVEL_OUTOF10: 10
PAINLEVEL_OUTOF10: 8
PAINLEVEL_OUTOF10: 6

## 2017-08-17 ASSESSMENT — PAIN DESCRIPTION - DESCRIPTORS
DESCRIPTORS: HEADACHE
DESCRIPTORS: HEADACHE

## 2017-08-17 ASSESSMENT — PAIN DESCRIPTION - FREQUENCY
FREQUENCY: CONTINUOUS
FREQUENCY: CONTINUOUS

## 2017-08-17 ASSESSMENT — ENCOUNTER SYMPTOMS
CONSTIPATION: 0
TROUBLE SWALLOWING: 0
BACK PAIN: 0
NAUSEA: 0
PHOTOPHOBIA: 0
DIARRHEA: 0
BLOOD IN STOOL: 0
COUGH: 0
SHORTNESS OF BREATH: 0
ABDOMINAL PAIN: 0
VOMITING: 0
SINUS PRESSURE: 0
RHINORRHEA: 0
SORE THROAT: 0

## 2017-08-17 ASSESSMENT — PAIN DESCRIPTION - PAIN TYPE
TYPE: ACUTE PAIN
TYPE: ACUTE PAIN

## 2017-08-17 ASSESSMENT — PAIN DESCRIPTION - ONSET
ONSET: PROGRESSIVE
ONSET: ON-GOING

## 2017-08-17 ASSESSMENT — PAIN - FUNCTIONAL ASSESSMENT: PAIN_FUNCTIONAL_ASSESSMENT: 0-10

## 2017-08-17 ASSESSMENT — PAIN DESCRIPTION - LOCATION
LOCATION: HEAD
LOCATION: HEAD

## 2017-08-17 ASSESSMENT — PAIN DESCRIPTION - ORIENTATION
ORIENTATION: ANTERIOR;LEFT
ORIENTATION: ANTERIOR

## 2017-08-17 ASSESSMENT — PAIN DESCRIPTION - PROGRESSION: CLINICAL_PROGRESSION: RAPIDLY WORSENING

## 2017-08-18 ENCOUNTER — APPOINTMENT (OUTPATIENT)
Dept: INTERVENTIONAL RADIOLOGY/VASCULAR | Age: 77
DRG: 025 | End: 2017-08-18
Payer: MEDICARE

## 2017-08-18 ENCOUNTER — APPOINTMENT (OUTPATIENT)
Dept: CT IMAGING | Age: 77
DRG: 025 | End: 2017-08-18
Payer: MEDICARE

## 2017-08-18 ENCOUNTER — APPOINTMENT (OUTPATIENT)
Dept: GENERAL RADIOLOGY | Age: 77
DRG: 025 | End: 2017-08-18
Payer: MEDICARE

## 2017-08-18 LAB
ABO/RH: NORMAL
ALLEN TEST: POSITIVE
ANTIBODY SCREEN: NEGATIVE
ARM BAND NUMBER: NORMAL
BLD PROD TYP BPU: NORMAL
BLD PROD TYP BPU: NORMAL
CROSSMATCH RESULT: NORMAL
CROSSMATCH RESULT: NORMAL
CULTURE: NORMAL
CULTURE: NORMAL
DISPENSE STATUS BLOOD BANK: NORMAL
DISPENSE STATUS BLOOD BANK: NORMAL
EKG ATRIAL RATE: 83 BPM
EKG P AXIS: 45 DEGREES
EKG P-R INTERVAL: 210 MS
EKG Q-T INTERVAL: 442 MS
EKG QRS DURATION: 114 MS
EKG QTC CALCULATION (BAZETT): 519 MS
EKG R AXIS: -55 DEGREES
EKG T AXIS: -96 DEGREES
EKG VENTRICULAR RATE: 83 BPM
EXPIRATION DATE: NORMAL
FIO2: ABNORMAL
GLUCOSE BLD-MCNC: 125 MG/DL (ref 65–105)
GLUCOSE BLD-MCNC: 129 MG/DL (ref 65–105)
GLUCOSE BLD-MCNC: 148 MG/DL (ref 65–105)
GLUCOSE BLD-MCNC: 161 MG/DL (ref 65–105)
GLUCOSE BLD-MCNC: 165 MG/DL (ref 65–105)
KEPPRA: 24 UG/ML
Lab: NORMAL
MODE: ABNORMAL
NEGATIVE BASE EXCESS, ART: ABNORMAL (ref 0–2)
O2 DEVICE/FLOW/%: ABNORMAL
PATIENT TEMP: ABNORMAL
POC HCO3: 24.8 MMOL/L (ref 21–28)
POC O2 SATURATION: 93 % (ref 94–98)
POC PCO2 TEMP: ABNORMAL MM HG
POC PCO2: 36.7 MM HG (ref 35–48)
POC PH TEMP: ABNORMAL
POC PH: 7.44 (ref 7.35–7.45)
POC PO2 TEMP: ABNORMAL MM HG
POC PO2: 64.3 MM HG (ref 83–108)
POSITIVE BASE EXCESS, ART: 1 (ref 0–3)
SAMPLE SITE: ABNORMAL
SPECIMEN DESCRIPTION: NORMAL
STATUS: NORMAL
TCO2 (CALC), ART: 26 MMOL/L (ref 22–29)
TRANSFUSION STATUS: NORMAL
TRANSFUSION STATUS: NORMAL
TROPONIN INTERP: ABNORMAL
TROPONIN T: 0.07 NG/ML
UNIT DIVISION: 0
UNIT DIVISION: 0
UNIT NUMBER: NORMAL
UNIT NUMBER: NORMAL

## 2017-08-18 PROCEDURE — 36415 COLL VENOUS BLD VENIPUNCTURE: CPT

## 2017-08-18 PROCEDURE — 2580000003 HC RX 258: Performed by: EMERGENCY MEDICINE

## 2017-08-18 PROCEDURE — 6370000000 HC RX 637 (ALT 250 FOR IP): Performed by: EMERGENCY MEDICINE

## 2017-08-18 PROCEDURE — 82947 ASSAY GLUCOSE BLOOD QUANT: CPT

## 2017-08-18 PROCEDURE — 37191 INS ENDOVAS VENA CAVA FILTR: CPT | Performed by: RADIOLOGY

## 2017-08-18 PROCEDURE — 06H03DZ INSERTION OF INTRALUMINAL DEVICE INTO INFERIOR VENA CAVA, PERCUTANEOUS APPROACH: ICD-10-PCS | Performed by: RADIOLOGY

## 2017-08-18 PROCEDURE — 94762 N-INVAS EAR/PLS OXIMTRY CONT: CPT

## 2017-08-18 PROCEDURE — 2500000003 HC RX 250 WO HCPCS: Performed by: STUDENT IN AN ORGANIZED HEALTH CARE EDUCATION/TRAINING PROGRAM

## 2017-08-18 PROCEDURE — G8978 MOBILITY CURRENT STATUS: HCPCS

## 2017-08-18 PROCEDURE — 6360000002 HC RX W HCPCS: Performed by: EMERGENCY MEDICINE

## 2017-08-18 PROCEDURE — 93005 ELECTROCARDIOGRAM TRACING: CPT

## 2017-08-18 PROCEDURE — 0BH18EZ INSERTION OF ENDOTRACHEAL AIRWAY INTO TRACHEA, VIA NATURAL OR ARTIFICIAL OPENING ENDOSCOPIC: ICD-10-PCS | Performed by: ANESTHESIOLOGY

## 2017-08-18 PROCEDURE — 97162 PT EVAL MOD COMPLEX 30 MIN: CPT

## 2017-08-18 PROCEDURE — 99291 CRITICAL CARE FIRST HOUR: CPT | Performed by: PSYCHIATRY & NEUROLOGY

## 2017-08-18 PROCEDURE — 82803 BLOOD GASES ANY COMBINATION: CPT

## 2017-08-18 PROCEDURE — S0028 INJECTION, FAMOTIDINE, 20 MG: HCPCS | Performed by: STUDENT IN AN ORGANIZED HEALTH CARE EDUCATION/TRAINING PROGRAM

## 2017-08-18 PROCEDURE — 5A1955Z RESPIRATORY VENTILATION, GREATER THAN 96 CONSECUTIVE HOURS: ICD-10-PCS | Performed by: INTERNAL MEDICINE

## 2017-08-18 PROCEDURE — G8979 MOBILITY GOAL STATUS: HCPCS

## 2017-08-18 PROCEDURE — 70450 CT HEAD/BRAIN W/O DYE: CPT

## 2017-08-18 PROCEDURE — 84484 ASSAY OF TROPONIN QUANT: CPT

## 2017-08-18 PROCEDURE — C1880 VENA CAVA FILTER: HCPCS

## 2017-08-18 PROCEDURE — 71010 XR CHEST PORTABLE: CPT

## 2017-08-18 PROCEDURE — 97110 THERAPEUTIC EXERCISES: CPT

## 2017-08-18 PROCEDURE — 80177 DRUG SCRN QUAN LEVETIRACETAM: CPT

## 2017-08-18 PROCEDURE — 36600 WITHDRAWAL OF ARTERIAL BLOOD: CPT

## 2017-08-18 PROCEDURE — 6360000002 HC RX W HCPCS

## 2017-08-18 PROCEDURE — 94002 VENT MGMT INPAT INIT DAY: CPT

## 2017-08-18 PROCEDURE — 94770 HC ETCO2 MONITOR DAILY: CPT

## 2017-08-18 PROCEDURE — 2000000003 HC NEURO ICU R&B

## 2017-08-18 PROCEDURE — 31500 INSERT EMERGENCY AIRWAY: CPT

## 2017-08-18 PROCEDURE — 6360000004 HC RX CONTRAST MEDICATION: Performed by: PSYCHIATRY & NEUROLOGY

## 2017-08-18 RX ORDER — 0.9 % SODIUM CHLORIDE 0.9 %
500 INTRAVENOUS SOLUTION INTRAVENOUS ONCE
Status: COMPLETED | OUTPATIENT
Start: 2017-08-18 | End: 2017-08-19

## 2017-08-18 RX ORDER — MIDAZOLAM HYDROCHLORIDE 1 MG/ML
3 INJECTION INTRAMUSCULAR; INTRAVENOUS ONCE
Status: COMPLETED | OUTPATIENT
Start: 2017-08-18 | End: 2017-08-18

## 2017-08-18 RX ORDER — MIDAZOLAM HYDROCHLORIDE 1 MG/ML
INJECTION INTRAMUSCULAR; INTRAVENOUS
Status: COMPLETED
Start: 2017-08-18 | End: 2017-08-18

## 2017-08-18 RX ORDER — ACETAMINOPHEN 325 MG/1
650 TABLET ORAL EVERY 4 HOURS PRN
Status: DISCONTINUED | OUTPATIENT
Start: 2017-08-18 | End: 2017-08-22

## 2017-08-18 RX ORDER — LORAZEPAM 2 MG/ML
2 INJECTION INTRAMUSCULAR ONCE
Status: COMPLETED | OUTPATIENT
Start: 2017-08-18 | End: 2017-08-18

## 2017-08-18 RX ORDER — PHENYTOIN SODIUM 50 MG/ML
200 INJECTION, SOLUTION INTRAMUSCULAR; INTRAVENOUS EVERY 12 HOURS
Status: DISCONTINUED | OUTPATIENT
Start: 2017-08-19 | End: 2017-08-20

## 2017-08-18 RX ORDER — ACETAMINOPHEN 650 MG/1
650 SUPPOSITORY RECTAL EVERY 4 HOURS PRN
Status: DISCONTINUED | OUTPATIENT
Start: 2017-08-18 | End: 2017-08-22

## 2017-08-18 RX ADMIN — LEVETIRACETAM 750 MG: 500 INJECTION, SOLUTION, CONCENTRATE INTRAVENOUS at 21:55

## 2017-08-18 RX ADMIN — FAMOTIDINE 20 MG: 10 INJECTION, SOLUTION INTRAVENOUS at 08:38

## 2017-08-18 RX ADMIN — INSULIN LISPRO 1 UNITS: 100 INJECTION, SOLUTION INTRAVENOUS; SUBCUTANEOUS at 21:55

## 2017-08-18 RX ADMIN — MIDAZOLAM HYDROCHLORIDE 3 MG: 1 INJECTION, SOLUTION INTRAMUSCULAR; INTRAVENOUS at 22:56

## 2017-08-18 RX ADMIN — LEVETIRACETAM 750 MG: 500 INJECTION, SOLUTION, CONCENTRATE INTRAVENOUS at 09:21

## 2017-08-18 RX ADMIN — MIDAZOLAM HYDROCHLORIDE 3 MG: 1 INJECTION, SOLUTION INTRAMUSCULAR; INTRAVENOUS at 23:29

## 2017-08-18 RX ADMIN — PHENYTOIN SODIUM 500 MG: 50 INJECTION INTRAMUSCULAR; INTRAVENOUS at 18:20

## 2017-08-18 RX ADMIN — ACETAMINOPHEN 650 MG: 650 SUPPOSITORY RECTAL at 22:41

## 2017-08-18 RX ADMIN — SODIUM CHLORIDE 500 ML: 9 INJECTION, SOLUTION INTRAVENOUS at 23:30

## 2017-08-18 RX ADMIN — LORAZEPAM 2 MG: 2 INJECTION INTRAMUSCULAR; INTRAVENOUS at 17:11

## 2017-08-18 RX ADMIN — MIDAZOLAM HYDROCHLORIDE 3 MG: 1 INJECTION INTRAMUSCULAR; INTRAVENOUS at 23:29

## 2017-08-18 RX ADMIN — Medication 2 MG/HR: at 23:30

## 2017-08-18 RX ADMIN — IOVERSOL 50 ML: 741 INJECTION INTRA-ARTERIAL; INTRAVENOUS at 15:23

## 2017-08-18 RX ADMIN — MIDAZOLAM HYDROCHLORIDE 3 MG: 1 INJECTION INTRAMUSCULAR; INTRAVENOUS at 22:56

## 2017-08-18 RX ADMIN — FAMOTIDINE 20 MG: 10 INJECTION, SOLUTION INTRAVENOUS at 21:55

## 2017-08-18 RX ADMIN — PHENYTOIN SODIUM 1000 MG: 50 INJECTION INTRAMUSCULAR; INTRAVENOUS at 16:56

## 2017-08-18 ASSESSMENT — PAIN SCALES - WONG BAKER: WONGBAKER_NUMERICALRESPONSE: 0

## 2017-08-18 ASSESSMENT — PAIN SCALES - GENERAL: PAINLEVEL_OUTOF10: 0

## 2017-08-18 ASSESSMENT — PULMONARY FUNCTION TESTS: PIF_VALUE: 27

## 2017-08-19 ENCOUNTER — APPOINTMENT (OUTPATIENT)
Dept: GENERAL RADIOLOGY | Age: 77
DRG: 025 | End: 2017-08-19
Payer: MEDICARE

## 2017-08-19 LAB
ABSOLUTE EOS #: 0 K/UL (ref 0–0.4)
ABSOLUTE LYMPH #: 1.81 K/UL (ref 1–4.8)
ABSOLUTE MONO #: 1.66 K/UL (ref 0.1–0.8)
ALBUMIN SERPL-MCNC: 2.7 G/DL (ref 3.5–5.2)
ALBUMIN/GLOBULIN RATIO: 0.8 (ref 1–2.5)
ALLEN TEST: POSITIVE
ALLEN TEST: POSITIVE
ALP BLD-CCNC: 64 U/L (ref 35–104)
ALT SERPL-CCNC: 10 U/L (ref 5–33)
ANION GAP SERPL CALCULATED.3IONS-SCNC: 21 MMOL/L (ref 9–17)
AST SERPL-CCNC: 16 U/L
BASOPHILS # BLD: 0 %
BASOPHILS ABSOLUTE: 0 K/UL (ref 0–0.2)
BILIRUB SERPL-MCNC: 0.36 MG/DL (ref 0.3–1.2)
BILIRUBIN DIRECT: 0.14 MG/DL
BILIRUBIN, INDIRECT: 0.22 MG/DL (ref 0–1)
BUN BLDV-MCNC: 14 MG/DL (ref 8–23)
BUN/CREAT BLD: ABNORMAL (ref 9–20)
CALCIUM SERPL-MCNC: 8.4 MG/DL (ref 8.6–10.4)
CHLORIDE BLD-SCNC: 102 MMOL/L (ref 98–107)
CO2: 20 MMOL/L (ref 20–31)
CREAT SERPL-MCNC: 0.66 MG/DL (ref 0.5–0.9)
DIFFERENTIAL TYPE: ABNORMAL
EOSINOPHILS RELATIVE PERCENT: 0 %
FIO2: 100
FIO2: 40
GFR AFRICAN AMERICAN: >60 ML/MIN
GFR NON-AFRICAN AMERICAN: >60 ML/MIN
GFR SERPL CREATININE-BSD FRML MDRD: ABNORMAL ML/MIN/{1.73_M2}
GFR SERPL CREATININE-BSD FRML MDRD: ABNORMAL ML/MIN/{1.73_M2}
GLOBULIN: ABNORMAL G/DL (ref 1.5–3.8)
GLUCOSE BLD-MCNC: 135 MG/DL (ref 65–105)
GLUCOSE BLD-MCNC: 147 MG/DL (ref 65–105)
GLUCOSE BLD-MCNC: 149 MG/DL (ref 70–99)
GLUCOSE BLD-MCNC: 156 MG/DL (ref 65–105)
GLUCOSE BLD-MCNC: 164 MG/DL (ref 65–105)
HCT VFR BLD CALC: 32.7 % (ref 36–46)
HEMOGLOBIN: 10.6 G/DL (ref 12–16)
LACTIC ACID, WHOLE BLOOD: 1.5 MMOL/L (ref 0.7–2.1)
LV EF: 28 %
LVEF MODALITY: NORMAL
LYMPHOCYTES # BLD: 12 %
MAGNESIUM: 1.5 MG/DL (ref 1.6–2.6)
MCH RBC QN AUTO: 32 PG (ref 26–34)
MCHC RBC AUTO-ENTMCNC: 32.4 G/DL (ref 31–37)
MCV RBC AUTO: 98.9 FL (ref 80–100)
MODE: ABNORMAL
MODE: NORMAL
MONOCYTES # BLD: 11 %
MORPHOLOGY: ABNORMAL
NEGATIVE BASE EXCESS, ART: ABNORMAL (ref 0–2)
NEGATIVE BASE EXCESS, ART: NORMAL (ref 0–2)
O2 DEVICE/FLOW/%: ABNORMAL
O2 DEVICE/FLOW/%: NORMAL
PATIENT TEMP: ABNORMAL
PATIENT TEMP: NORMAL
PDW BLD-RTO: 16.8 % (ref 12.5–15.4)
PHENYTOIN FREE: 2.5 UG/ML (ref 1–2)
PHOSPHORUS: 2.3 MG/DL (ref 2.6–4.5)
PLATELET # BLD: 236 K/UL (ref 140–450)
PLATELET ESTIMATE: ABNORMAL
PMV BLD AUTO: 8.6 FL (ref 6–12)
POC HCO3: 24.2 MMOL/L (ref 21–28)
POC HCO3: 26.9 MMOL/L (ref 21–28)
POC O2 SATURATION: 100 % (ref 94–98)
POC O2 SATURATION: 97 % (ref 94–98)
POC PCO2 TEMP: ABNORMAL MM HG
POC PCO2 TEMP: NORMAL MM HG
POC PCO2: 36.8 MM HG (ref 35–48)
POC PCO2: 43.4 MM HG (ref 35–48)
POC PH TEMP: ABNORMAL
POC PH TEMP: NORMAL
POC PH: 7.4 (ref 7.35–7.45)
POC PH: 7.42 (ref 7.35–7.45)
POC PO2 TEMP: ABNORMAL MM HG
POC PO2 TEMP: NORMAL MM HG
POC PO2: 243.2 MM HG (ref 83–108)
POC PO2: 90.3 MM HG (ref 83–108)
POSITIVE BASE EXCESS, ART: 0 (ref 0–3)
POSITIVE BASE EXCESS, ART: 2 (ref 0–3)
POTASSIUM SERPL-SCNC: 3.6 MMOL/L (ref 3.7–5.3)
RBC # BLD: 3.31 M/UL (ref 4–5.2)
RBC # BLD: ABNORMAL 10*6/UL
SAMPLE SITE: ABNORMAL
SAMPLE SITE: NORMAL
SEG NEUTROPHILS: 77 %
SEGMENTED NEUTROPHILS ABSOLUTE COUNT: 11.63 K/UL (ref 1.8–7.7)
SODIUM BLD-SCNC: 143 MMOL/L (ref 135–144)
TCO2 (CALC), ART: 25 MMOL/L (ref 22–29)
TCO2 (CALC), ART: 28 MMOL/L (ref 22–29)
TOTAL PROTEIN: 6.2 G/DL (ref 6.4–8.3)
WBC # BLD: 15.1 K/UL (ref 3.5–11)
WBC # BLD: ABNORMAL 10*3/UL

## 2017-08-19 PROCEDURE — 2000000003 HC NEURO ICU R&B

## 2017-08-19 PROCEDURE — 99291 CRITICAL CARE FIRST HOUR: CPT | Performed by: PSYCHIATRY & NEUROLOGY

## 2017-08-19 PROCEDURE — 36415 COLL VENOUS BLD VENIPUNCTURE: CPT

## 2017-08-19 PROCEDURE — 2500000003 HC RX 250 WO HCPCS: Performed by: EMERGENCY MEDICINE

## 2017-08-19 PROCEDURE — 85025 COMPLETE CBC W/AUTO DIFF WBC: CPT

## 2017-08-19 PROCEDURE — 82947 ASSAY GLUCOSE BLOOD QUANT: CPT

## 2017-08-19 PROCEDURE — 6370000000 HC RX 637 (ALT 250 FOR IP): Performed by: STUDENT IN AN ORGANIZED HEALTH CARE EDUCATION/TRAINING PROGRAM

## 2017-08-19 PROCEDURE — 80186 ASSAY OF PHENYTOIN FREE: CPT

## 2017-08-19 PROCEDURE — 2500000003 HC RX 250 WO HCPCS: Performed by: STUDENT IN AN ORGANIZED HEALTH CARE EDUCATION/TRAINING PROGRAM

## 2017-08-19 PROCEDURE — 6370000000 HC RX 637 (ALT 250 FOR IP): Performed by: RADIOLOGY

## 2017-08-19 PROCEDURE — 83735 ASSAY OF MAGNESIUM: CPT

## 2017-08-19 PROCEDURE — 6360000002 HC RX W HCPCS: Performed by: EMERGENCY MEDICINE

## 2017-08-19 PROCEDURE — 87040 BLOOD CULTURE FOR BACTERIA: CPT

## 2017-08-19 PROCEDURE — 2580000003 HC RX 258: Performed by: EMERGENCY MEDICINE

## 2017-08-19 PROCEDURE — 94003 VENT MGMT INPAT SUBQ DAY: CPT

## 2017-08-19 PROCEDURE — 80076 HEPATIC FUNCTION PANEL: CPT

## 2017-08-19 PROCEDURE — 82803 BLOOD GASES ANY COMBINATION: CPT

## 2017-08-19 PROCEDURE — 36620 INSERTION CATHETER ARTERY: CPT | Performed by: PSYCHIATRY & NEUROLOGY

## 2017-08-19 PROCEDURE — 6370000000 HC RX 637 (ALT 250 FOR IP): Performed by: EMERGENCY MEDICINE

## 2017-08-19 PROCEDURE — 36620 INSERTION CATHETER ARTERY: CPT

## 2017-08-19 PROCEDURE — S0028 INJECTION, FAMOTIDINE, 20 MG: HCPCS | Performed by: STUDENT IN AN ORGANIZED HEALTH CARE EDUCATION/TRAINING PROGRAM

## 2017-08-19 PROCEDURE — 87086 URINE CULTURE/COLONY COUNT: CPT

## 2017-08-19 PROCEDURE — 93306 TTE W/DOPPLER COMPLETE: CPT

## 2017-08-19 PROCEDURE — 94762 N-INVAS EAR/PLS OXIMTRY CONT: CPT

## 2017-08-19 PROCEDURE — 84100 ASSAY OF PHOSPHORUS: CPT

## 2017-08-19 PROCEDURE — 94770 HC ETCO2 MONITOR DAILY: CPT

## 2017-08-19 PROCEDURE — 36600 WITHDRAWAL OF ARTERIAL BLOOD: CPT

## 2017-08-19 PROCEDURE — 74000 XR ABDOMEN LIMITED (KUB): CPT

## 2017-08-19 PROCEDURE — 83605 ASSAY OF LACTIC ACID: CPT

## 2017-08-19 PROCEDURE — 02HV33Z INSERTION OF INFUSION DEVICE INTO SUPERIOR VENA CAVA, PERCUTANEOUS APPROACH: ICD-10-PCS | Performed by: PSYCHIATRY & NEUROLOGY

## 2017-08-19 PROCEDURE — 80048 BASIC METABOLIC PNL TOTAL CA: CPT

## 2017-08-19 RX ORDER — ACETAMINOPHEN 325 MG/1
650 TABLET ORAL EVERY 6 HOURS
Status: DISCONTINUED | OUTPATIENT
Start: 2017-08-19 | End: 2017-08-22

## 2017-08-19 RX ORDER — ACETAMINOPHEN 325 MG/1
650 TABLET ORAL EVERY 6 HOURS
Status: DISCONTINUED | OUTPATIENT
Start: 2017-08-19 | End: 2017-08-19

## 2017-08-19 RX ORDER — POLYETHYLENE GLYCOL 3350 17 G/17G
17 POWDER, FOR SOLUTION ORAL DAILY
Status: DISCONTINUED | OUTPATIENT
Start: 2017-08-19 | End: 2017-09-11 | Stop reason: HOSPADM

## 2017-08-19 RX ORDER — MAGNESIUM SULFATE 1 G/100ML
1 INJECTION INTRAVENOUS ONCE
Status: COMPLETED | OUTPATIENT
Start: 2017-08-19 | End: 2017-08-19

## 2017-08-19 RX ORDER — POTASSIUM CHLORIDE 7.45 MG/ML
10 INJECTION INTRAVENOUS PRN
Status: DISCONTINUED | OUTPATIENT
Start: 2017-08-19 | End: 2017-09-11

## 2017-08-19 RX ORDER — POTASSIUM CHLORIDE 20MEQ/15ML
40 LIQUID (ML) ORAL PRN
Status: DISCONTINUED | OUTPATIENT
Start: 2017-08-19 | End: 2017-09-11

## 2017-08-19 RX ORDER — POTASSIUM CHLORIDE 20 MEQ/1
40 TABLET, EXTENDED RELEASE ORAL PRN
Status: DISCONTINUED | OUTPATIENT
Start: 2017-08-19 | End: 2017-09-11

## 2017-08-19 RX ORDER — 0.9 % SODIUM CHLORIDE 0.9 %
500 INTRAVENOUS SOLUTION INTRAVENOUS ONCE
Status: COMPLETED | OUTPATIENT
Start: 2017-08-19 | End: 2017-08-19

## 2017-08-19 RX ORDER — FUROSEMIDE 10 MG/ML
40 INJECTION INTRAMUSCULAR; INTRAVENOUS ONCE
Status: COMPLETED | OUTPATIENT
Start: 2017-08-19 | End: 2017-08-19

## 2017-08-19 RX ADMIN — ACETAMINOPHEN 650 MG: 325 TABLET ORAL at 10:41

## 2017-08-19 RX ADMIN — SPIRONOLACTONE 50 MG: 25 TABLET ORAL at 20:06

## 2017-08-19 RX ADMIN — NOREPINEPHRINE BITARTRATE 10 MCG/MIN: 1 INJECTION, SOLUTION, CONCENTRATE INTRAVENOUS at 03:02

## 2017-08-19 RX ADMIN — PHENYTOIN SODIUM 200 MG: 50 INJECTION INTRAMUSCULAR; INTRAVENOUS at 15:57

## 2017-08-19 RX ADMIN — FAMOTIDINE 20 MG: 10 INJECTION, SOLUTION INTRAVENOUS at 08:53

## 2017-08-19 RX ADMIN — Medication 3 MG/HR: at 20:24

## 2017-08-19 RX ADMIN — ACETAMINOPHEN 650 MG: 325 TABLET ORAL at 15:28

## 2017-08-19 RX ADMIN — SODIUM PHOSPHATE, MONOBASIC, MONOHYDRATE 10 MMOL: 276; 142 INJECTION, SOLUTION INTRAVENOUS at 19:39

## 2017-08-19 RX ADMIN — POLYETHYLENE GLYCOL 3350 17 G: 17 POWDER, FOR SOLUTION ORAL at 19:48

## 2017-08-19 RX ADMIN — SODIUM CHLORIDE 500 ML: 9 INJECTION, SOLUTION INTRAVENOUS at 01:27

## 2017-08-19 RX ADMIN — INSULIN LISPRO 1 UNITS: 100 INJECTION, SOLUTION INTRAVENOUS; SUBCUTANEOUS at 22:06

## 2017-08-19 RX ADMIN — MAGNESIUM SULFATE HEPTAHYDRATE 1 G: 1 INJECTION, SOLUTION INTRAVENOUS at 21:51

## 2017-08-19 RX ADMIN — LEVETIRACETAM 750 MG: 500 INJECTION, SOLUTION, CONCENTRATE INTRAVENOUS at 08:54

## 2017-08-19 RX ADMIN — INSULIN LISPRO 1 UNITS: 100 INJECTION, SOLUTION INTRAVENOUS; SUBCUTANEOUS at 18:03

## 2017-08-19 RX ADMIN — FAMOTIDINE 20 MG: 10 INJECTION, SOLUTION INTRAVENOUS at 19:46

## 2017-08-19 RX ADMIN — LEVETIRACETAM 1000 MG: 100 INJECTION, SOLUTION INTRAVENOUS at 22:09

## 2017-08-19 RX ADMIN — PHENYTOIN SODIUM 200 MG: 50 INJECTION INTRAMUSCULAR; INTRAVENOUS at 04:10

## 2017-08-19 RX ADMIN — ACETAMINOPHEN 650 MG: 325 TABLET ORAL at 19:47

## 2017-08-19 RX ADMIN — INSULIN LISPRO 1 UNITS: 100 INJECTION, SOLUTION INTRAVENOUS; SUBCUTANEOUS at 08:53

## 2017-08-19 RX ADMIN — FUROSEMIDE 40 MG: 10 INJECTION, SOLUTION INTRAVENOUS at 18:04

## 2017-08-19 ASSESSMENT — PULMONARY FUNCTION TESTS
PIF_VALUE: 20
PIF_VALUE: 21
PIF_VALUE: 19
PIF_VALUE: 18
PIF_VALUE: 19
PIF_VALUE: 20
PIF_VALUE: 19
PIF_VALUE: 51

## 2017-08-19 ASSESSMENT — PAIN SCALES - GENERAL: PAINLEVEL_OUTOF10: 0

## 2017-08-20 ENCOUNTER — APPOINTMENT (OUTPATIENT)
Dept: CT IMAGING | Age: 77
DRG: 025 | End: 2017-08-20
Payer: MEDICARE

## 2017-08-20 LAB
ABSOLUTE EOS #: 0.1 K/UL (ref 0–0.4)
ABSOLUTE LYMPH #: 1.1 K/UL (ref 1–4.8)
ABSOLUTE MONO #: 0.7 K/UL (ref 0.1–1.2)
ALLEN TEST: ABNORMAL
ANION GAP SERPL CALCULATED.3IONS-SCNC: 18 MMOL/L (ref 9–17)
BASOPHILS # BLD: 0 %
BASOPHILS ABSOLUTE: 0 K/UL (ref 0–0.2)
BUN BLDV-MCNC: 12 MG/DL (ref 8–23)
BUN/CREAT BLD: ABNORMAL (ref 9–20)
CALCIUM SERPL-MCNC: 8.1 MG/DL (ref 8.6–10.4)
CHLORIDE BLD-SCNC: 99 MMOL/L (ref 98–107)
CO2: 25 MMOL/L (ref 20–31)
CREAT SERPL-MCNC: 0.55 MG/DL (ref 0.5–0.9)
CULTURE: NO GROWTH
CULTURE: NORMAL
DIFFERENTIAL TYPE: ABNORMAL
EOSINOPHILS RELATIVE PERCENT: 1 %
FIO2: ABNORMAL
GFR AFRICAN AMERICAN: >60 ML/MIN
GFR NON-AFRICAN AMERICAN: >60 ML/MIN
GFR SERPL CREATININE-BSD FRML MDRD: ABNORMAL ML/MIN/{1.73_M2}
GFR SERPL CREATININE-BSD FRML MDRD: ABNORMAL ML/MIN/{1.73_M2}
GLUCOSE BLD-MCNC: 121 MG/DL (ref 65–105)
GLUCOSE BLD-MCNC: 122 MG/DL (ref 65–105)
GLUCOSE BLD-MCNC: 123 MG/DL (ref 70–99)
GLUCOSE BLD-MCNC: 229 MG/DL (ref 65–105)
HCT VFR BLD CALC: 28.5 % (ref 36–46)
HEMOGLOBIN: 9.5 G/DL (ref 12–16)
KEPPRA: 27 UG/ML
LYMPHOCYTES # BLD: 10 %
Lab: NORMAL
MAGNESIUM: 1.7 MG/DL (ref 1.6–2.6)
MCH RBC QN AUTO: 32 PG (ref 26–34)
MCHC RBC AUTO-ENTMCNC: 33.4 G/DL (ref 31–37)
MCV RBC AUTO: 95.9 FL (ref 80–100)
MODE: ABNORMAL
MONOCYTES # BLD: 6 %
NEGATIVE BASE EXCESS, ART: ABNORMAL (ref 0–2)
O2 DEVICE/FLOW/%: ABNORMAL
PATIENT TEMP: ABNORMAL
PDW BLD-RTO: 16.7 % (ref 12.5–15.4)
PHENYTOIN DATE LAST DOSE: NORMAL
PHENYTOIN DOSE AMOUNT: NORMAL
PHENYTOIN DOSE TIME: NORMAL
PHENYTOIN FREE: 2.6 UG/ML (ref 1–2)
PHENYTOIN LEVEL: 17.5 UG/ML (ref 10–20)
PHOSPHORUS: 2.2 MG/DL (ref 2.6–4.5)
PLATELET # BLD: 185 K/UL (ref 140–450)
PLATELET ESTIMATE: ABNORMAL
PMV BLD AUTO: 9 FL (ref 6–12)
POC HCO3: 30.8 MMOL/L (ref 21–28)
POC O2 SATURATION: 97 % (ref 94–98)
POC PCO2 TEMP: ABNORMAL MM HG
POC PCO2: 38.3 MM HG (ref 35–48)
POC PH TEMP: ABNORMAL
POC PH: 7.51 (ref 7.35–7.45)
POC PO2 TEMP: ABNORMAL MM HG
POC PO2: 83.3 MM HG (ref 83–108)
POSITIVE BASE EXCESS, ART: 7 (ref 0–3)
POTASSIUM SERPL-SCNC: 3.2 MMOL/L (ref 3.7–5.3)
RBC # BLD: 2.97 M/UL (ref 4–5.2)
RBC # BLD: ABNORMAL 10*6/UL
SAMPLE SITE: ABNORMAL
SEG NEUTROPHILS: 83 %
SEGMENTED NEUTROPHILS ABSOLUTE COUNT: 9.7 K/UL (ref 1.8–7.7)
SODIUM BLD-SCNC: 142 MMOL/L (ref 135–144)
SPECIMEN DESCRIPTION: NORMAL
STATUS: NORMAL
TCO2 (CALC), ART: 32 MMOL/L (ref 22–29)
TSH SERPL DL<=0.05 MIU/L-ACNC: 2.46 MIU/L (ref 0.3–5)
WBC # BLD: 11.6 K/UL (ref 3.5–11)
WBC # BLD: ABNORMAL 10*3/UL

## 2017-08-20 PROCEDURE — 84100 ASSAY OF PHOSPHORUS: CPT

## 2017-08-20 PROCEDURE — 94003 VENT MGMT INPAT SUBQ DAY: CPT

## 2017-08-20 PROCEDURE — 6360000002 HC RX W HCPCS: Performed by: EMERGENCY MEDICINE

## 2017-08-20 PROCEDURE — 99291 CRITICAL CARE FIRST HOUR: CPT | Performed by: INTERNAL MEDICINE

## 2017-08-20 PROCEDURE — 2580000003 HC RX 258: Performed by: EMERGENCY MEDICINE

## 2017-08-20 PROCEDURE — 6360000002 HC RX W HCPCS

## 2017-08-20 PROCEDURE — 99291 CRITICAL CARE FIRST HOUR: CPT | Performed by: PSYCHIATRY & NEUROLOGY

## 2017-08-20 PROCEDURE — 2500000003 HC RX 250 WO HCPCS: Performed by: EMERGENCY MEDICINE

## 2017-08-20 PROCEDURE — 80185 ASSAY OF PHENYTOIN TOTAL: CPT

## 2017-08-20 PROCEDURE — 82803 BLOOD GASES ANY COMBINATION: CPT

## 2017-08-20 PROCEDURE — 85025 COMPLETE CBC W/AUTO DIFF WBC: CPT

## 2017-08-20 PROCEDURE — 2580000003 HC RX 258: Performed by: PSYCHIATRY & NEUROLOGY

## 2017-08-20 PROCEDURE — 6360000002 HC RX W HCPCS: Performed by: PSYCHIATRY & NEUROLOGY

## 2017-08-20 PROCEDURE — 95951 HC EEG MONITORING VIDEO RECORDING: CPT

## 2017-08-20 PROCEDURE — 83735 ASSAY OF MAGNESIUM: CPT

## 2017-08-20 PROCEDURE — 94770 HC ETCO2 MONITOR DAILY: CPT

## 2017-08-20 PROCEDURE — 87070 CULTURE OTHR SPECIMN AEROBIC: CPT

## 2017-08-20 PROCEDURE — 2000000003 HC NEURO ICU R&B

## 2017-08-20 PROCEDURE — 6370000000 HC RX 637 (ALT 250 FOR IP): Performed by: INTERNAL MEDICINE

## 2017-08-20 PROCEDURE — S0028 INJECTION, FAMOTIDINE, 20 MG: HCPCS | Performed by: STUDENT IN AN ORGANIZED HEALTH CARE EDUCATION/TRAINING PROGRAM

## 2017-08-20 PROCEDURE — 2500000003 HC RX 250 WO HCPCS: Performed by: STUDENT IN AN ORGANIZED HEALTH CARE EDUCATION/TRAINING PROGRAM

## 2017-08-20 PROCEDURE — 87186 SC STD MICRODIL/AGAR DIL: CPT

## 2017-08-20 PROCEDURE — 80048 BASIC METABOLIC PNL TOTAL CA: CPT

## 2017-08-20 PROCEDURE — 84443 ASSAY THYROID STIM HORMONE: CPT

## 2017-08-20 PROCEDURE — 87205 SMEAR GRAM STAIN: CPT

## 2017-08-20 PROCEDURE — 80186 ASSAY OF PHENYTOIN FREE: CPT

## 2017-08-20 PROCEDURE — 80177 DRUG SCRN QUAN LEVETIRACETAM: CPT

## 2017-08-20 PROCEDURE — 6370000000 HC RX 637 (ALT 250 FOR IP): Performed by: EMERGENCY MEDICINE

## 2017-08-20 PROCEDURE — 70450 CT HEAD/BRAIN W/O DYE: CPT

## 2017-08-20 PROCEDURE — 6370000000 HC RX 637 (ALT 250 FOR IP): Performed by: STUDENT IN AN ORGANIZED HEALTH CARE EDUCATION/TRAINING PROGRAM

## 2017-08-20 PROCEDURE — 82947 ASSAY GLUCOSE BLOOD QUANT: CPT

## 2017-08-20 PROCEDURE — 87077 CULTURE AEROBIC IDENTIFY: CPT

## 2017-08-20 PROCEDURE — 94762 N-INVAS EAR/PLS OXIMTRY CONT: CPT

## 2017-08-20 RX ORDER — LORAZEPAM 2 MG/ML
4 INJECTION INTRAMUSCULAR EVERY 6 HOURS PRN
Status: DISCONTINUED | OUTPATIENT
Start: 2017-08-20 | End: 2017-09-11 | Stop reason: HOSPADM

## 2017-08-20 RX ORDER — PHENYTOIN SODIUM 50 MG/ML
200 INJECTION, SOLUTION INTRAMUSCULAR; INTRAVENOUS EVERY 12 HOURS
Status: DISCONTINUED | OUTPATIENT
Start: 2017-08-21 | End: 2017-08-20

## 2017-08-20 RX ORDER — LORAZEPAM 2 MG/ML
INJECTION INTRAMUSCULAR
Status: COMPLETED
Start: 2017-08-20 | End: 2017-08-20

## 2017-08-20 RX ORDER — LORAZEPAM 2 MG/ML
2 INJECTION INTRAMUSCULAR EVERY 6 HOURS PRN
Status: DISCONTINUED | OUTPATIENT
Start: 2017-08-20 | End: 2017-08-20

## 2017-08-20 RX ORDER — PHENYTOIN SODIUM 50 MG/ML
150 INJECTION, SOLUTION INTRAMUSCULAR; INTRAVENOUS
Status: DISCONTINUED | OUTPATIENT
Start: 2017-08-20 | End: 2017-08-22

## 2017-08-20 RX ORDER — PHENYTOIN SODIUM 50 MG/ML
100 INJECTION, SOLUTION INTRAMUSCULAR; INTRAVENOUS
Status: DISCONTINUED | OUTPATIENT
Start: 2017-08-21 | End: 2017-08-21

## 2017-08-20 RX ADMIN — PHENYTOIN SODIUM 200 MG: 50 INJECTION INTRAMUSCULAR; INTRAVENOUS at 05:00

## 2017-08-20 RX ADMIN — PHENYTOIN SODIUM 150 MG: 50 INJECTION INTRAMUSCULAR; INTRAVENOUS at 15:23

## 2017-08-20 RX ADMIN — LORAZEPAM 4 MG: 2 INJECTION INTRAMUSCULAR at 14:14

## 2017-08-20 RX ADMIN — SPIRONOLACTONE 50 MG: 25 TABLET ORAL at 08:28

## 2017-08-20 RX ADMIN — LEVETIRACETAM 1000 MG: 100 INJECTION, SOLUTION INTRAVENOUS at 14:40

## 2017-08-20 RX ADMIN — FAMOTIDINE 20 MG: 10 INJECTION, SOLUTION INTRAVENOUS at 23:16

## 2017-08-20 RX ADMIN — POLYETHYLENE GLYCOL 3350 17 G: 17 POWDER, FOR SOLUTION ORAL at 08:29

## 2017-08-20 RX ADMIN — SODIUM PHOSPHATE, MONOBASIC, MONOHYDRATE 10 MMOL: 276; 142 INJECTION, SOLUTION INTRAVENOUS at 16:27

## 2017-08-20 RX ADMIN — Medication 30 MG/HR: at 21:02

## 2017-08-20 RX ADMIN — POTASSIUM CHLORIDE 40 MEQ: 40 SOLUTION ORAL at 08:49

## 2017-08-20 RX ADMIN — ACETAMINOPHEN 650 MG: 325 TABLET ORAL at 15:23

## 2017-08-20 RX ADMIN — NOREPINEPHRINE BITARTRATE 8 MCG/MIN: 1 INJECTION, SOLUTION, CONCENTRATE INTRAVENOUS at 22:57

## 2017-08-20 RX ADMIN — FAMOTIDINE 20 MG: 10 INJECTION, SOLUTION INTRAVENOUS at 08:29

## 2017-08-20 RX ADMIN — INSULIN LISPRO 2 UNITS: 100 INJECTION, SOLUTION INTRAVENOUS; SUBCUTANEOUS at 17:25

## 2017-08-20 RX ADMIN — ACETAMINOPHEN 650 MG: 325 TABLET ORAL at 21:05

## 2017-08-20 RX ADMIN — Medication 25 MG/HR: at 17:52

## 2017-08-20 RX ADMIN — ACETAMINOPHEN 650 MG: 325 TABLET ORAL at 08:28

## 2017-08-20 RX ADMIN — LEVETIRACETAM 750 MG: 100 INJECTION, SOLUTION INTRAVENOUS at 16:28

## 2017-08-20 RX ADMIN — MAGNESIUM GLUCONATE 500 MG ORAL TABLET 400 MG: 500 TABLET ORAL at 08:28

## 2017-08-20 RX ADMIN — LORAZEPAM 4 MG: 2 INJECTION INTRAMUSCULAR; INTRAVENOUS at 14:14

## 2017-08-20 RX ADMIN — HYDROCHLOROTHIAZIDE 12.5 MG: 12.5 CAPSULE ORAL at 08:28

## 2017-08-20 RX ADMIN — ACETAMINOPHEN 650 MG: 325 TABLET ORAL at 04:00

## 2017-08-20 RX ADMIN — LEVETIRACETAM 1000 MG: 100 INJECTION, SOLUTION INTRAVENOUS at 08:29

## 2017-08-20 RX ADMIN — ALLOPURINOL 300 MG: 300 TABLET ORAL at 08:28

## 2017-08-20 ASSESSMENT — PULMONARY FUNCTION TESTS
PIF_VALUE: 19
PIF_VALUE: 20
PIF_VALUE: 22
PIF_VALUE: 20
PIF_VALUE: 18
PIF_VALUE: 20

## 2017-08-20 ASSESSMENT — PAIN SCALES - GENERAL: PAINLEVEL_OUTOF10: 0

## 2017-08-21 ENCOUNTER — APPOINTMENT (OUTPATIENT)
Dept: GENERAL RADIOLOGY | Age: 77
DRG: 025 | End: 2017-08-21
Payer: MEDICARE

## 2017-08-21 PROBLEM — J18.9 PNEUMONIA DUE TO INFECTIOUS ORGANISM: Status: ACTIVE | Noted: 2017-08-21

## 2017-08-21 PROBLEM — E87.1 HYPONATREMIA: Status: ACTIVE | Noted: 2017-08-21

## 2017-08-21 LAB
-: NORMAL
ABSOLUTE EOS #: 0.1 K/UL (ref 0–0.4)
ABSOLUTE LYMPH #: 0.7 K/UL (ref 1–4.8)
ABSOLUTE MONO #: 0.9 K/UL (ref 0.1–1.2)
ALLEN TEST: ABNORMAL
AMORPHOUS: NORMAL
ANION GAP SERPL CALCULATED.3IONS-SCNC: 11 MMOL/L (ref 9–17)
ANION GAP SERPL CALCULATED.3IONS-SCNC: 12 MMOL/L (ref 9–17)
BACTERIA: NORMAL
BASOPHILS # BLD: 0 %
BASOPHILS ABSOLUTE: 0 K/UL (ref 0–0.2)
BILIRUBIN URINE: NEGATIVE
BUN BLDV-MCNC: 12 MG/DL (ref 8–23)
BUN BLDV-MCNC: 13 MG/DL (ref 8–23)
BUN/CREAT BLD: ABNORMAL (ref 9–20)
BUN/CREAT BLD: ABNORMAL (ref 9–20)
CALCIUM IONIZED: 1.06 MMOL/L (ref 1.13–1.33)
CALCIUM SERPL-MCNC: 8 MG/DL (ref 8.6–10.4)
CALCIUM SERPL-MCNC: 8.2 MG/DL (ref 8.6–10.4)
CASTS UA: NORMAL /LPF (ref 0–8)
CHLORIDE BLD-SCNC: 95 MMOL/L (ref 98–107)
CHLORIDE BLD-SCNC: 96 MMOL/L (ref 98–107)
CO2: 25 MMOL/L (ref 20–31)
CO2: 26 MMOL/L (ref 20–31)
COLOR: YELLOW
COMMENT UA: ABNORMAL
CREAT SERPL-MCNC: 0.58 MG/DL (ref 0.5–0.9)
CREAT SERPL-MCNC: 0.61 MG/DL (ref 0.5–0.9)
CRYSTALS, UA: NORMAL /HPF
DIFFERENTIAL TYPE: ABNORMAL
EOSINOPHILS RELATIVE PERCENT: 1 %
EPITHELIAL CELLS UA: NORMAL /HPF (ref 0–5)
FIO2: ABNORMAL
GFR AFRICAN AMERICAN: >60 ML/MIN
GFR AFRICAN AMERICAN: >60 ML/MIN
GFR NON-AFRICAN AMERICAN: >60 ML/MIN
GFR NON-AFRICAN AMERICAN: >60 ML/MIN
GFR SERPL CREATININE-BSD FRML MDRD: ABNORMAL ML/MIN/{1.73_M2}
GLUCOSE BLD-MCNC: 194 MG/DL (ref 65–105)
GLUCOSE BLD-MCNC: 200 MG/DL (ref 65–105)
GLUCOSE BLD-MCNC: 224 MG/DL (ref 65–105)
GLUCOSE BLD-MCNC: 253 MG/DL (ref 70–99)
GLUCOSE BLD-MCNC: 266 MG/DL (ref 65–105)
GLUCOSE BLD-MCNC: 275 MG/DL (ref 70–99)
GLUCOSE URINE: ABNORMAL
HCT VFR BLD CALC: 27.5 % (ref 36–46)
HEMOGLOBIN: 9.1 G/DL (ref 12–16)
KETONES, URINE: NEGATIVE
LEUKOCYTE ESTERASE, URINE: NEGATIVE
LYMPHOCYTES # BLD: 7 %
MAGNESIUM: 1.6 MG/DL (ref 1.6–2.6)
MAGNESIUM: 2.6 MG/DL (ref 1.6–2.6)
MCH RBC QN AUTO: 32 PG (ref 26–34)
MCHC RBC AUTO-ENTMCNC: 33.1 G/DL (ref 31–37)
MCV RBC AUTO: 96.8 FL (ref 80–100)
MODE: ABNORMAL
MONOCYTES # BLD: 8 %
MUCUS: NORMAL
NEGATIVE BASE EXCESS, ART: ABNORMAL (ref 0–2)
NITRITE, URINE: NEGATIVE
O2 DEVICE/FLOW/%: ABNORMAL
OTHER OBSERVATIONS UA: NORMAL
PATIENT TEMP: 39.2
PDW BLD-RTO: 16.8 % (ref 12.5–15.4)
PH UA: 6 (ref 5–8)
PHENYTOIN DATE LAST DOSE: ABNORMAL
PHENYTOIN DOSE AMOUNT: ABNORMAL
PHENYTOIN DOSE TIME: ABNORMAL
PHENYTOIN FREE: 2.8 UG/ML (ref 1–2)
PHENYTOIN LEVEL: 15.7 UG/ML (ref 10–20)
PHOSPHORUS: 2.5 MG/DL (ref 2.6–4.5)
PLATELET # BLD: 180 K/UL (ref 140–450)
PLATELET ESTIMATE: ABNORMAL
PMV BLD AUTO: 9.1 FL (ref 6–12)
POC HCO3: 29.2 MMOL/L (ref 21–28)
POC O2 SATURATION: 97 % (ref 94–98)
POC PCO2 TEMP: 42 MM HG
POC PCO2: 38 MM HG (ref 35–48)
POC PH TEMP: 7.46
POC PH: 7.49 (ref 7.35–7.45)
POC PO2 TEMP: 93 MM HG
POC PO2: 80.4 MM HG (ref 83–108)
POSITIVE BASE EXCESS, ART: 6 (ref 0–3)
POTASSIUM SERPL-SCNC: 3.4 MMOL/L (ref 3.7–5.3)
POTASSIUM SERPL-SCNC: 4.2 MMOL/L (ref 3.7–5.3)
PROCALCITONIN: 9.94 NG/ML
PROTEIN UA: ABNORMAL
RBC # BLD: 2.85 M/UL (ref 4–5.2)
RBC # BLD: ABNORMAL 10*6/UL
RBC UA: NORMAL /HPF (ref 0–4)
RENAL EPITHELIAL, UA: NORMAL /HPF
SAMPLE SITE: ABNORMAL
SEG NEUTROPHILS: 84 %
SEGMENTED NEUTROPHILS ABSOLUTE COUNT: 8.8 K/UL (ref 1.8–7.7)
SODIUM BLD-SCNC: 131 MMOL/L (ref 135–144)
SODIUM BLD-SCNC: 134 MMOL/L (ref 135–144)
SPECIFIC GRAVITY UA: 1.02 (ref 1–1.03)
TCO2 (CALC), ART: 30 MMOL/L (ref 22–29)
TRICHOMONAS: NORMAL
TURBIDITY: CLEAR
URINE HGB: ABNORMAL
UROBILINOGEN, URINE: NORMAL
WBC # BLD: 10.5 K/UL (ref 3.5–11)
WBC # BLD: ABNORMAL 10*3/UL
WBC UA: NORMAL /HPF (ref 0–5)
YEAST: NORMAL

## 2017-08-21 PROCEDURE — 6360000002 HC RX W HCPCS: Performed by: PSYCHIATRY & NEUROLOGY

## 2017-08-21 PROCEDURE — 6370000000 HC RX 637 (ALT 250 FOR IP): Performed by: EMERGENCY MEDICINE

## 2017-08-21 PROCEDURE — 2580000003 HC RX 258: Performed by: NEUROLOGICAL SURGERY

## 2017-08-21 PROCEDURE — 82947 ASSAY GLUCOSE BLOOD QUANT: CPT

## 2017-08-21 PROCEDURE — 2580000003 HC RX 258: Performed by: PSYCHIATRY & NEUROLOGY

## 2017-08-21 PROCEDURE — 36415 COLL VENOUS BLD VENIPUNCTURE: CPT

## 2017-08-21 PROCEDURE — 95951 HC EEG MONITORING VIDEO RECORDING: CPT

## 2017-08-21 PROCEDURE — 81001 URINALYSIS AUTO W/SCOPE: CPT

## 2017-08-21 PROCEDURE — 84100 ASSAY OF PHOSPHORUS: CPT

## 2017-08-21 PROCEDURE — 99233 SBSQ HOSP IP/OBS HIGH 50: CPT | Performed by: PSYCHIATRY & NEUROLOGY

## 2017-08-21 PROCEDURE — 87070 CULTURE OTHR SPECIMN AEROBIC: CPT

## 2017-08-21 PROCEDURE — 2500000003 HC RX 250 WO HCPCS: Performed by: STUDENT IN AN ORGANIZED HEALTH CARE EDUCATION/TRAINING PROGRAM

## 2017-08-21 PROCEDURE — 6370000000 HC RX 637 (ALT 250 FOR IP): Performed by: INTERNAL MEDICINE

## 2017-08-21 PROCEDURE — 2000000003 HC NEURO ICU R&B

## 2017-08-21 PROCEDURE — 83735 ASSAY OF MAGNESIUM: CPT

## 2017-08-21 PROCEDURE — 80185 ASSAY OF PHENYTOIN TOTAL: CPT

## 2017-08-21 PROCEDURE — S0028 INJECTION, FAMOTIDINE, 20 MG: HCPCS | Performed by: STUDENT IN AN ORGANIZED HEALTH CARE EDUCATION/TRAINING PROGRAM

## 2017-08-21 PROCEDURE — 6360000002 HC RX W HCPCS: Performed by: EMERGENCY MEDICINE

## 2017-08-21 PROCEDURE — 85025 COMPLETE CBC W/AUTO DIFF WBC: CPT

## 2017-08-21 PROCEDURE — 2580000003 HC RX 258: Performed by: EMERGENCY MEDICINE

## 2017-08-21 PROCEDURE — 82803 BLOOD GASES ANY COMBINATION: CPT

## 2017-08-21 PROCEDURE — 6360000002 HC RX W HCPCS: Performed by: NEUROLOGICAL SURGERY

## 2017-08-21 PROCEDURE — 94003 VENT MGMT INPAT SUBQ DAY: CPT

## 2017-08-21 PROCEDURE — 94762 N-INVAS EAR/PLS OXIMTRY CONT: CPT

## 2017-08-21 PROCEDURE — 87040 BLOOD CULTURE FOR BACTERIA: CPT

## 2017-08-21 PROCEDURE — 80048 BASIC METABOLIC PNL TOTAL CA: CPT

## 2017-08-21 PROCEDURE — 84145 PROCALCITONIN (PCT): CPT

## 2017-08-21 PROCEDURE — 71010 XR CHEST PORTABLE: CPT

## 2017-08-21 PROCEDURE — 87205 SMEAR GRAM STAIN: CPT

## 2017-08-21 PROCEDURE — 6370000000 HC RX 637 (ALT 250 FOR IP): Performed by: STUDENT IN AN ORGANIZED HEALTH CARE EDUCATION/TRAINING PROGRAM

## 2017-08-21 PROCEDURE — 94681 O2 UPTK CO2 OUTP % O2 XTRC: CPT

## 2017-08-21 PROCEDURE — 99291 CRITICAL CARE FIRST HOUR: CPT | Performed by: INTERNAL MEDICINE

## 2017-08-21 PROCEDURE — 99291 CRITICAL CARE FIRST HOUR: CPT | Performed by: NEUROLOGICAL SURGERY

## 2017-08-21 PROCEDURE — 82330 ASSAY OF CALCIUM: CPT

## 2017-08-21 PROCEDURE — 80186 ASSAY OF PHENYTOIN FREE: CPT

## 2017-08-21 PROCEDURE — 87086 URINE CULTURE/COLONY COUNT: CPT

## 2017-08-21 PROCEDURE — 2500000003 HC RX 250 WO HCPCS: Performed by: EMERGENCY MEDICINE

## 2017-08-21 RX ORDER — PHENYTOIN SODIUM 50 MG/ML
100 INJECTION, SOLUTION INTRAMUSCULAR; INTRAVENOUS
Status: DISCONTINUED | OUTPATIENT
Start: 2017-08-22 | End: 2017-08-22

## 2017-08-21 RX ORDER — POTASSIUM CHLORIDE 20MEQ/15ML
40 LIQUID (ML) ORAL ONCE
Status: COMPLETED | OUTPATIENT
Start: 2017-08-21 | End: 2017-08-21

## 2017-08-21 RX ORDER — POTASSIUM CHLORIDE 20 MEQ/1
20 TABLET, EXTENDED RELEASE ORAL ONCE
Status: DISCONTINUED | OUTPATIENT
Start: 2017-08-21 | End: 2017-08-21

## 2017-08-21 RX ORDER — SENNA AND DOCUSATE SODIUM 50; 8.6 MG/1; MG/1
2 TABLET, FILM COATED ORAL DAILY
Status: DISCONTINUED | OUTPATIENT
Start: 2017-08-21 | End: 2017-09-11 | Stop reason: HOSPADM

## 2017-08-21 RX ADMIN — INSULIN LISPRO 1 UNITS: 100 INJECTION, SOLUTION INTRAVENOUS; SUBCUTANEOUS at 01:08

## 2017-08-21 RX ADMIN — LEVETIRACETAM 1500 MG: 500 INJECTION, SOLUTION, CONCENTRATE INTRAVENOUS at 20:53

## 2017-08-21 RX ADMIN — LEVETIRACETAM 750 MG: 100 INJECTION, SOLUTION INTRAVENOUS at 01:04

## 2017-08-21 RX ADMIN — Medication 30 MG/HR: at 15:39

## 2017-08-21 RX ADMIN — Medication 30 MG/HR: at 22:29

## 2017-08-21 RX ADMIN — SPIRONOLACTONE 50 MG: 25 TABLET ORAL at 20:43

## 2017-08-21 RX ADMIN — ACETAMINOPHEN 650 MG: 325 TABLET ORAL at 20:43

## 2017-08-21 RX ADMIN — POTASSIUM CHLORIDE 40 MEQ: 40 SOLUTION ORAL at 18:09

## 2017-08-21 RX ADMIN — ACETAMINOPHEN 650 MG: 325 TABLET ORAL at 03:33

## 2017-08-21 RX ADMIN — INSULIN LISPRO 3 UNITS: 100 INJECTION, SOLUTION INTRAVENOUS; SUBCUTANEOUS at 18:10

## 2017-08-21 RX ADMIN — DOCUSATE SODIUM -SENNOSIDES 2 TABLET: 50; 8.6 TABLET, COATED ORAL at 10:39

## 2017-08-21 RX ADMIN — ACETAMINOPHEN 650 MG: 325 TABLET ORAL at 10:38

## 2017-08-21 RX ADMIN — POTASSIUM CHLORIDE 40 MEQ: 40 SOLUTION ORAL at 10:42

## 2017-08-21 RX ADMIN — PHENYTOIN SODIUM 150 MG: 50 INJECTION INTRAMUSCULAR; INTRAVENOUS at 15:05

## 2017-08-21 RX ADMIN — Medication 30 MG/HR: at 00:34

## 2017-08-21 RX ADMIN — MAGNESIUM SULFATE HEPTAHYDRATE 3 G: 500 INJECTION, SOLUTION INTRAMUSCULAR; INTRAVENOUS at 20:34

## 2017-08-21 RX ADMIN — SPIRONOLACTONE 50 MG: 25 TABLET ORAL at 10:39

## 2017-08-21 RX ADMIN — MAGNESIUM GLUCONATE 500 MG ORAL TABLET 400 MG: 500 TABLET ORAL at 10:36

## 2017-08-21 RX ADMIN — ALLOPURINOL 300 MG: 300 TABLET ORAL at 10:37

## 2017-08-21 RX ADMIN — FAMOTIDINE 20 MG: 10 INJECTION, SOLUTION INTRAVENOUS at 10:37

## 2017-08-21 RX ADMIN — INSULIN LISPRO 2 UNITS: 100 INJECTION, SOLUTION INTRAVENOUS; SUBCUTANEOUS at 05:48

## 2017-08-21 RX ADMIN — POLYETHYLENE GLYCOL 3350 17 G: 17 POWDER, FOR SOLUTION ORAL at 10:40

## 2017-08-21 RX ADMIN — Medication 30 MG/HR: at 09:28

## 2017-08-21 RX ADMIN — Medication 30 MG/HR: at 05:54

## 2017-08-21 RX ADMIN — SODIUM PHOSPHATE, MONOBASIC, MONOHYDRATE AND SODIUM PHOSPHATE, DIBASIC, ANHYDROUS 10 MMOL: 276; 142 INJECTION, SOLUTION INTRAVENOUS at 16:46

## 2017-08-21 RX ADMIN — Medication 30 MG/HR: at 19:09

## 2017-08-21 RX ADMIN — FAMOTIDINE 20 MG: 10 INJECTION, SOLUTION INTRAVENOUS at 20:44

## 2017-08-21 RX ADMIN — VANCOMYCIN HYDROCHLORIDE 1250 MG: 10 INJECTION, POWDER, LYOPHILIZED, FOR SOLUTION INTRAVENOUS at 17:24

## 2017-08-21 RX ADMIN — INSULIN LISPRO 2 UNITS: 100 INJECTION, SOLUTION INTRAVENOUS; SUBCUTANEOUS at 12:29

## 2017-08-21 RX ADMIN — Medication 30 MG/HR: at 03:30

## 2017-08-21 RX ADMIN — Medication 30 MG/HR: at 12:36

## 2017-08-21 RX ADMIN — CEFEPIME 2 G: 2 INJECTION, POWDER, FOR SOLUTION INTRAVENOUS at 16:33

## 2017-08-21 RX ADMIN — ACETAMINOPHEN 650 MG: 325 TABLET ORAL at 15:08

## 2017-08-21 ASSESSMENT — PULMONARY FUNCTION TESTS
PIF_VALUE: 20
PIF_VALUE: 22
PIF_VALUE: 21
PIF_VALUE: 22
PIF_VALUE: 21
PIF_VALUE: 19
PIF_VALUE: 19

## 2017-08-22 LAB
ABSOLUTE EOS #: 0.2 K/UL (ref 0–0.4)
ABSOLUTE LYMPH #: 1.4 K/UL (ref 1–4.8)
ABSOLUTE MONO #: 1.1 K/UL (ref 0.1–1.2)
ALLEN TEST: POSITIVE
ANION GAP SERPL CALCULATED.3IONS-SCNC: 15 MMOL/L (ref 9–17)
BASOPHILS # BLD: 1 %
BASOPHILS ABSOLUTE: 0.1 K/UL (ref 0–0.2)
BUN BLDV-MCNC: 14 MG/DL (ref 8–23)
BUN/CREAT BLD: ABNORMAL (ref 9–20)
CALCIUM SERPL-MCNC: 8.5 MG/DL (ref 8.6–10.4)
CHLORIDE BLD-SCNC: 98 MMOL/L (ref 98–107)
CO2: 25 MMOL/L (ref 20–31)
CREAT SERPL-MCNC: 0.59 MG/DL (ref 0.5–0.9)
CULTURE: NO GROWTH
CULTURE: NORMAL
DIFFERENTIAL TYPE: ABNORMAL
EOSINOPHILS RELATIVE PERCENT: 2 %
FIO2: 30
GFR AFRICAN AMERICAN: >60 ML/MIN
GFR NON-AFRICAN AMERICAN: >60 ML/MIN
GFR SERPL CREATININE-BSD FRML MDRD: ABNORMAL ML/MIN/{1.73_M2}
GFR SERPL CREATININE-BSD FRML MDRD: ABNORMAL ML/MIN/{1.73_M2}
GLUCOSE BLD-MCNC: 171 MG/DL (ref 65–105)
GLUCOSE BLD-MCNC: 180 MG/DL (ref 65–105)
GLUCOSE BLD-MCNC: 197 MG/DL (ref 65–105)
GLUCOSE BLD-MCNC: 202 MG/DL (ref 70–99)
GLUCOSE BLD-MCNC: 218 MG/DL (ref 65–105)
HCT VFR BLD CALC: 29.9 % (ref 36–46)
HEMOGLOBIN: 9.8 G/DL (ref 12–16)
LYMPHOCYTES # BLD: 9 %
Lab: NORMAL
MAGNESIUM: 2.5 MG/DL (ref 1.6–2.6)
MAGNESIUM: 2.6 MG/DL (ref 1.6–2.6)
MCH RBC QN AUTO: 31.8 PG (ref 26–34)
MCHC RBC AUTO-ENTMCNC: 32.6 G/DL (ref 31–37)
MCV RBC AUTO: 97.6 FL (ref 80–100)
MODE: ABNORMAL
MONOCYTES # BLD: 7 %
NEGATIVE BASE EXCESS, ART: ABNORMAL (ref 0–2)
O2 DEVICE/FLOW/%: ABNORMAL
PATIENT TEMP: ABNORMAL
PDW BLD-RTO: 16.4 % (ref 12.5–15.4)
PHENYTOIN DATE LAST DOSE: ABNORMAL
PHENYTOIN DATE LAST DOSE: NORMAL
PHENYTOIN DOSE AMOUNT: ABNORMAL
PHENYTOIN DOSE AMOUNT: NORMAL
PHENYTOIN DOSE TIME: ABNORMAL
PHENYTOIN DOSE TIME: NORMAL
PHENYTOIN FREE: 3.2 UG/ML (ref 1–2)
PHENYTOIN FREE: 3.9 UG/ML (ref 1–2)
PHENYTOIN LEVEL: 15.5 UG/ML (ref 10–20)
PHENYTOIN LEVEL: 17.4 UG/ML (ref 10–20)
PHOSPHORUS: 3.8 MG/DL (ref 2.6–4.5)
PHOSPHORUS: 4.5 MG/DL (ref 2.6–4.5)
PLATELET # BLD: 209 K/UL (ref 140–450)
PLATELET ESTIMATE: ABNORMAL
PMV BLD AUTO: 8.6 FL (ref 6–12)
POC HCO3: 29.7 MMOL/L (ref 21–28)
POC O2 SATURATION: 93 % (ref 94–98)
POC PCO2 TEMP: ABNORMAL MM HG
POC PCO2: 42.4 MM HG (ref 35–48)
POC PH TEMP: ABNORMAL
POC PH: 7.45 (ref 7.35–7.45)
POC PO2 TEMP: ABNORMAL MM HG
POC PO2: 64.7 MM HG (ref 83–108)
POSITIVE BASE EXCESS, ART: 5 (ref 0–3)
POTASSIUM SERPL-SCNC: 4.8 MMOL/L (ref 3.7–5.3)
POTASSIUM SERPL-SCNC: 4.9 MMOL/L (ref 3.7–5.3)
RBC # BLD: 3.07 M/UL (ref 4–5.2)
RBC # BLD: ABNORMAL 10*6/UL
SAMPLE SITE: ABNORMAL
SEG NEUTROPHILS: 81 %
SEGMENTED NEUTROPHILS ABSOLUTE COUNT: 12.5 K/UL (ref 1.8–7.7)
SODIUM BLD-SCNC: 138 MMOL/L (ref 135–144)
SPECIMEN DESCRIPTION: NORMAL
STATUS: NORMAL
TCO2 (CALC), ART: 31 MMOL/L (ref 22–29)
WBC # BLD: 15.3 K/UL (ref 3.5–11)
WBC # BLD: ABNORMAL 10*3/UL

## 2017-08-22 PROCEDURE — 36415 COLL VENOUS BLD VENIPUNCTURE: CPT

## 2017-08-22 PROCEDURE — 80185 ASSAY OF PHENYTOIN TOTAL: CPT

## 2017-08-22 PROCEDURE — 2580000003 HC RX 258: Performed by: EMERGENCY MEDICINE

## 2017-08-22 PROCEDURE — 6360000002 HC RX W HCPCS: Performed by: NEUROLOGICAL SURGERY

## 2017-08-22 PROCEDURE — 94003 VENT MGMT INPAT SUBQ DAY: CPT

## 2017-08-22 PROCEDURE — 6360000002 HC RX W HCPCS: Performed by: EMERGENCY MEDICINE

## 2017-08-22 PROCEDURE — 80186 ASSAY OF PHENYTOIN FREE: CPT

## 2017-08-22 PROCEDURE — 6370000000 HC RX 637 (ALT 250 FOR IP): Performed by: STUDENT IN AN ORGANIZED HEALTH CARE EDUCATION/TRAINING PROGRAM

## 2017-08-22 PROCEDURE — S0028 INJECTION, FAMOTIDINE, 20 MG: HCPCS | Performed by: STUDENT IN AN ORGANIZED HEALTH CARE EDUCATION/TRAINING PROGRAM

## 2017-08-22 PROCEDURE — 80048 BASIC METABOLIC PNL TOTAL CA: CPT

## 2017-08-22 PROCEDURE — 99291 CRITICAL CARE FIRST HOUR: CPT | Performed by: NEUROLOGICAL SURGERY

## 2017-08-22 PROCEDURE — 2500000003 HC RX 250 WO HCPCS: Performed by: STUDENT IN AN ORGANIZED HEALTH CARE EDUCATION/TRAINING PROGRAM

## 2017-08-22 PROCEDURE — 83735 ASSAY OF MAGNESIUM: CPT

## 2017-08-22 PROCEDURE — 6360000002 HC RX W HCPCS: Performed by: PSYCHIATRY & NEUROLOGY

## 2017-08-22 PROCEDURE — 36600 WITHDRAWAL OF ARTERIAL BLOOD: CPT

## 2017-08-22 PROCEDURE — 6370000000 HC RX 637 (ALT 250 FOR IP): Performed by: EMERGENCY MEDICINE

## 2017-08-22 PROCEDURE — 2580000003 HC RX 258: Performed by: NEUROLOGICAL SURGERY

## 2017-08-22 PROCEDURE — 2500000003 HC RX 250 WO HCPCS: Performed by: EMERGENCY MEDICINE

## 2017-08-22 PROCEDURE — 2580000003 HC RX 258: Performed by: INTERNAL MEDICINE

## 2017-08-22 PROCEDURE — 82803 BLOOD GASES ANY COMBINATION: CPT

## 2017-08-22 PROCEDURE — 84100 ASSAY OF PHOSPHORUS: CPT

## 2017-08-22 PROCEDURE — 82947 ASSAY GLUCOSE BLOOD QUANT: CPT

## 2017-08-22 PROCEDURE — 99291 CRITICAL CARE FIRST HOUR: CPT | Performed by: INTERNAL MEDICINE

## 2017-08-22 PROCEDURE — 97110 THERAPEUTIC EXERCISES: CPT

## 2017-08-22 PROCEDURE — 94762 N-INVAS EAR/PLS OXIMTRY CONT: CPT

## 2017-08-22 PROCEDURE — 2000000003 HC NEURO ICU R&B

## 2017-08-22 PROCEDURE — 84132 ASSAY OF SERUM POTASSIUM: CPT

## 2017-08-22 PROCEDURE — 85025 COMPLETE CBC W/AUTO DIFF WBC: CPT

## 2017-08-22 PROCEDURE — 99233 SBSQ HOSP IP/OBS HIGH 50: CPT | Performed by: PSYCHIATRY & NEUROLOGY

## 2017-08-22 PROCEDURE — 6370000000 HC RX 637 (ALT 250 FOR IP): Performed by: INTERNAL MEDICINE

## 2017-08-22 PROCEDURE — 94770 HC ETCO2 MONITOR DAILY: CPT

## 2017-08-22 PROCEDURE — 6360000002 HC RX W HCPCS: Performed by: INTERNAL MEDICINE

## 2017-08-22 RX ORDER — ACETAMINOPHEN 325 MG/1
650 TABLET ORAL EVERY 6 HOURS PRN
Status: DISCONTINUED | OUTPATIENT
Start: 2017-08-22 | End: 2017-09-11 | Stop reason: HOSPADM

## 2017-08-22 RX ORDER — PHENYTOIN SODIUM 100 MG/1
100 CAPSULE, EXTENDED RELEASE ORAL EVERY 8 HOURS
Status: DISCONTINUED | OUTPATIENT
Start: 2017-08-22 | End: 2017-08-22

## 2017-08-22 RX ORDER — PHENYTOIN SODIUM 50 MG/ML
100 INJECTION, SOLUTION INTRAMUSCULAR; INTRAVENOUS EVERY 8 HOURS
Status: DISCONTINUED | OUTPATIENT
Start: 2017-08-22 | End: 2017-08-22

## 2017-08-22 RX ADMIN — Medication 25 MG/HR: at 18:19

## 2017-08-22 RX ADMIN — LEVETIRACETAM 1500 MG: 500 INJECTION, SOLUTION, CONCENTRATE INTRAVENOUS at 08:48

## 2017-08-22 RX ADMIN — ALLOPURINOL 300 MG: 300 TABLET ORAL at 08:49

## 2017-08-22 RX ADMIN — Medication 30 MG/HR: at 11:21

## 2017-08-22 RX ADMIN — LEVETIRACETAM 1500 MG: 500 INJECTION, SOLUTION, CONCENTRATE INTRAVENOUS at 21:59

## 2017-08-22 RX ADMIN — Medication 25 MG/HR: at 14:41

## 2017-08-22 RX ADMIN — VANCOMYCIN HYDROCHLORIDE 1250 MG: 10 INJECTION, POWDER, LYOPHILIZED, FOR SOLUTION INTRAVENOUS at 15:46

## 2017-08-22 RX ADMIN — INSULIN LISPRO 2 UNITS: 100 INJECTION, SOLUTION INTRAVENOUS; SUBCUTANEOUS at 00:36

## 2017-08-22 RX ADMIN — POLYETHYLENE GLYCOL 3350 17 G: 17 POWDER, FOR SOLUTION ORAL at 08:48

## 2017-08-22 RX ADMIN — Medication 30 MG/HR: at 08:00

## 2017-08-22 RX ADMIN — PHENYTOIN SODIUM 100 MG: 50 INJECTION INTRAMUSCULAR; INTRAVENOUS at 13:54

## 2017-08-22 RX ADMIN — PHENYTOIN SODIUM 150 MG: 50 INJECTION INTRAMUSCULAR; INTRAVENOUS at 08:48

## 2017-08-22 RX ADMIN — INSULIN LISPRO 1 UNITS: 100 INJECTION, SOLUTION INTRAVENOUS; SUBCUTANEOUS at 18:09

## 2017-08-22 RX ADMIN — ACETAMINOPHEN 650 MG: 325 TABLET ORAL at 17:23

## 2017-08-22 RX ADMIN — FAMOTIDINE 20 MG: 10 INJECTION, SOLUTION INTRAVENOUS at 08:49

## 2017-08-22 RX ADMIN — Medication 30 MG/HR: at 02:00

## 2017-08-22 RX ADMIN — Medication 30 MG/HR: at 05:07

## 2017-08-22 RX ADMIN — ACETAMINOPHEN 650 MG: 325 TABLET ORAL at 02:47

## 2017-08-22 RX ADMIN — INSULIN LISPRO 1 UNITS: 100 INJECTION, SOLUTION INTRAVENOUS; SUBCUTANEOUS at 12:04

## 2017-08-22 RX ADMIN — MEROPENEM 1 G: 1 INJECTION, POWDER, FOR SOLUTION INTRAVENOUS at 21:45

## 2017-08-22 RX ADMIN — NOREPINEPHRINE BITARTRATE 5 MCG/MIN: 1 INJECTION, SOLUTION, CONCENTRATE INTRAVENOUS at 15:23

## 2017-08-22 RX ADMIN — ACETAMINOPHEN 650 MG: 325 TABLET ORAL at 08:49

## 2017-08-22 RX ADMIN — DOCUSATE SODIUM -SENNOSIDES 2 TABLET: 50; 8.6 TABLET, COATED ORAL at 08:48

## 2017-08-22 RX ADMIN — FAMOTIDINE 20 MG: 10 INJECTION, SOLUTION INTRAVENOUS at 22:00

## 2017-08-22 RX ADMIN — MAGNESIUM GLUCONATE 500 MG ORAL TABLET 400 MG: 500 TABLET ORAL at 08:48

## 2017-08-22 RX ADMIN — Medication 25 MG/HR: at 22:12

## 2017-08-22 RX ADMIN — VANCOMYCIN HYDROCHLORIDE 1250 MG: 10 INJECTION, POWDER, LYOPHILIZED, FOR SOLUTION INTRAVENOUS at 04:48

## 2017-08-22 RX ADMIN — SPIRONOLACTONE 50 MG: 25 TABLET ORAL at 08:48

## 2017-08-22 RX ADMIN — INSULIN LISPRO 1 UNITS: 100 INJECTION, SOLUTION INTRAVENOUS; SUBCUTANEOUS at 06:59

## 2017-08-22 RX ADMIN — CEFTRIAXONE SODIUM 1 G: 1 INJECTION, POWDER, FOR SOLUTION INTRAMUSCULAR; INTRAVENOUS at 00:27

## 2017-08-22 ASSESSMENT — PULMONARY FUNCTION TESTS
PIF_VALUE: 24

## 2017-08-23 ENCOUNTER — APPOINTMENT (OUTPATIENT)
Dept: GENERAL RADIOLOGY | Age: 77
DRG: 025 | End: 2017-08-23
Payer: MEDICARE

## 2017-08-23 LAB
ABSOLUTE EOS #: 0.2 K/UL (ref 0–0.4)
ABSOLUTE LYMPH #: 3.4 K/UL (ref 1–4.8)
ABSOLUTE MONO #: 2.2 K/UL (ref 0.1–0.8)
ALLEN TEST: POSITIVE
ANION GAP SERPL CALCULATED.3IONS-SCNC: 16 MMOL/L (ref 9–17)
BASOPHILS # BLD: 0 %
BASOPHILS ABSOLUTE: 0 K/UL (ref 0–0.2)
BUN BLDV-MCNC: 15 MG/DL (ref 8–23)
BUN/CREAT BLD: ABNORMAL (ref 9–20)
CALCIUM SERPL-MCNC: 8.5 MG/DL (ref 8.6–10.4)
CHLORIDE BLD-SCNC: 93 MMOL/L (ref 98–107)
CO2: 26 MMOL/L (ref 20–31)
CREAT SERPL-MCNC: 0.62 MG/DL (ref 0.5–0.9)
CULTURE: ABNORMAL
DIFFERENTIAL TYPE: ABNORMAL
DIRECT EXAM: ABNORMAL
EOSINOPHILS RELATIVE PERCENT: 1 %
FIO2: 30
GFR AFRICAN AMERICAN: >60 ML/MIN
GFR NON-AFRICAN AMERICAN: >60 ML/MIN
GFR SERPL CREATININE-BSD FRML MDRD: ABNORMAL ML/MIN/{1.73_M2}
GFR SERPL CREATININE-BSD FRML MDRD: ABNORMAL ML/MIN/{1.73_M2}
GLUCOSE BLD-MCNC: 161 MG/DL (ref 65–105)
GLUCOSE BLD-MCNC: 172 MG/DL (ref 65–105)
GLUCOSE BLD-MCNC: 178 MG/DL (ref 65–105)
GLUCOSE BLD-MCNC: 181 MG/DL (ref 65–105)
GLUCOSE BLD-MCNC: 224 MG/DL (ref 70–99)
HCT VFR BLD CALC: 29.8 % (ref 36–46)
HEMOGLOBIN: 9.9 G/DL (ref 12–16)
LYMPHOCYTES # BLD: 17 %
Lab: ABNORMAL
Lab: ABNORMAL
MAGNESIUM: 2.1 MG/DL (ref 1.6–2.6)
MCH RBC QN AUTO: 32 PG (ref 26–34)
MCHC RBC AUTO-ENTMCNC: 33.4 G/DL (ref 31–37)
MCV RBC AUTO: 95.7 FL (ref 80–100)
MODE: ABNORMAL
MONOCYTES # BLD: 11 %
MORPHOLOGY: ABNORMAL
NEGATIVE BASE EXCESS, ART: ABNORMAL (ref 0–2)
O2 DEVICE/FLOW/%: ABNORMAL
ORGANISM: ABNORMAL
PATIENT TEMP: ABNORMAL
PDW BLD-RTO: 16.4 % (ref 12.5–15.4)
PHENYTOIN DATE LAST DOSE: ABNORMAL
PHENYTOIN DOSE AMOUNT: ABNORMAL
PHENYTOIN DOSE TIME: ABNORMAL
PHENYTOIN FREE: 3.6 UG/ML (ref 1–2)
PHENYTOIN LEVEL: 14.6 UG/ML (ref 10–20)
PHOSPHORUS: 4.2 MG/DL (ref 2.6–4.5)
PLATELET # BLD: ABNORMAL K/UL (ref 140–450)
PLATELET ESTIMATE: ABNORMAL
PMV BLD AUTO: ABNORMAL FL (ref 6–12)
POC HCO3: 30.6 MMOL/L (ref 21–28)
POC O2 SATURATION: 94 % (ref 94–98)
POC PCO2 TEMP: ABNORMAL MM HG
POC PCO2: 42.2 MM HG (ref 35–48)
POC PH TEMP: ABNORMAL
POC PH: 7.47 (ref 7.35–7.45)
POC PO2 TEMP: ABNORMAL MM HG
POC PO2: 65.8 MM HG (ref 83–108)
POSITIVE BASE EXCESS, ART: 6 (ref 0–3)
POTASSIUM SERPL-SCNC: 4.9 MMOL/L (ref 3.7–5.3)
RBC # BLD: 3.11 M/UL (ref 4–5.2)
RBC # BLD: ABNORMAL 10*6/UL
SAMPLE SITE: ABNORMAL
SEG NEUTROPHILS: 71 %
SEGMENTED NEUTROPHILS ABSOLUTE COUNT: 14.2 K/UL (ref 1.8–7.7)
SODIUM BLD-SCNC: 135 MMOL/L (ref 135–144)
SPECIMEN DESCRIPTION: ABNORMAL
SPECIMEN DESCRIPTION: ABNORMAL
STATUS: ABNORMAL
STATUS: ABNORMAL
TCO2 (CALC), ART: 32 MMOL/L (ref 22–29)
WBC # BLD: 20 K/UL (ref 3.5–11)
WBC # BLD: ABNORMAL 10*3/UL

## 2017-08-23 PROCEDURE — 6360000002 HC RX W HCPCS: Performed by: PSYCHIATRY & NEUROLOGY

## 2017-08-23 PROCEDURE — 97110 THERAPEUTIC EXERCISES: CPT

## 2017-08-23 PROCEDURE — 6360000002 HC RX W HCPCS: Performed by: NEUROLOGICAL SURGERY

## 2017-08-23 PROCEDURE — 6370000000 HC RX 637 (ALT 250 FOR IP): Performed by: EMERGENCY MEDICINE

## 2017-08-23 PROCEDURE — 84100 ASSAY OF PHOSPHORUS: CPT

## 2017-08-23 PROCEDURE — 6360000002 HC RX W HCPCS: Performed by: EMERGENCY MEDICINE

## 2017-08-23 PROCEDURE — 95951 PR EEG MONITORING/VIDEORECORD: CPT | Performed by: PSYCHIATRY & NEUROLOGY

## 2017-08-23 PROCEDURE — 85025 COMPLETE CBC W/AUTO DIFF WBC: CPT

## 2017-08-23 PROCEDURE — 94762 N-INVAS EAR/PLS OXIMTRY CONT: CPT

## 2017-08-23 PROCEDURE — 99291 CRITICAL CARE FIRST HOUR: CPT | Performed by: NEUROLOGICAL SURGERY

## 2017-08-23 PROCEDURE — 94003 VENT MGMT INPAT SUBQ DAY: CPT

## 2017-08-23 PROCEDURE — 97530 THERAPEUTIC ACTIVITIES: CPT

## 2017-08-23 PROCEDURE — 36600 WITHDRAWAL OF ARTERIAL BLOOD: CPT

## 2017-08-23 PROCEDURE — 80048 BASIC METABOLIC PNL TOTAL CA: CPT

## 2017-08-23 PROCEDURE — 2000000003 HC NEURO ICU R&B

## 2017-08-23 PROCEDURE — 2580000003 HC RX 258: Performed by: STUDENT IN AN ORGANIZED HEALTH CARE EDUCATION/TRAINING PROGRAM

## 2017-08-23 PROCEDURE — S0028 INJECTION, FAMOTIDINE, 20 MG: HCPCS | Performed by: STUDENT IN AN ORGANIZED HEALTH CARE EDUCATION/TRAINING PROGRAM

## 2017-08-23 PROCEDURE — 2580000003 HC RX 258: Performed by: EMERGENCY MEDICINE

## 2017-08-23 PROCEDURE — 6370000000 HC RX 637 (ALT 250 FOR IP): Performed by: INTERNAL MEDICINE

## 2017-08-23 PROCEDURE — 2500000003 HC RX 250 WO HCPCS: Performed by: STUDENT IN AN ORGANIZED HEALTH CARE EDUCATION/TRAINING PROGRAM

## 2017-08-23 PROCEDURE — 71010 XR CHEST PORTABLE: CPT

## 2017-08-23 PROCEDURE — 6360000002 HC RX W HCPCS: Performed by: INTERNAL MEDICINE

## 2017-08-23 PROCEDURE — 82803 BLOOD GASES ANY COMBINATION: CPT

## 2017-08-23 PROCEDURE — 80186 ASSAY OF PHENYTOIN FREE: CPT

## 2017-08-23 PROCEDURE — 99291 CRITICAL CARE FIRST HOUR: CPT | Performed by: INTERNAL MEDICINE

## 2017-08-23 PROCEDURE — 82947 ASSAY GLUCOSE BLOOD QUANT: CPT

## 2017-08-23 PROCEDURE — 94770 HC ETCO2 MONITOR DAILY: CPT

## 2017-08-23 PROCEDURE — 36415 COLL VENOUS BLD VENIPUNCTURE: CPT

## 2017-08-23 PROCEDURE — 2580000003 HC RX 258: Performed by: INTERNAL MEDICINE

## 2017-08-23 PROCEDURE — 80185 ASSAY OF PHENYTOIN TOTAL: CPT

## 2017-08-23 PROCEDURE — 95951 HC EEG MONITORING VIDEO RECORDING: CPT

## 2017-08-23 PROCEDURE — 99291 CRITICAL CARE FIRST HOUR: CPT | Performed by: PSYCHIATRY & NEUROLOGY

## 2017-08-23 PROCEDURE — 83735 ASSAY OF MAGNESIUM: CPT

## 2017-08-23 PROCEDURE — 6370000000 HC RX 637 (ALT 250 FOR IP): Performed by: STUDENT IN AN ORGANIZED HEALTH CARE EDUCATION/TRAINING PROGRAM

## 2017-08-23 PROCEDURE — 2580000003 HC RX 258: Performed by: NEUROLOGICAL SURGERY

## 2017-08-23 RX ORDER — FLUTICASONE PROPIONATE 50 MCG
1 SPRAY, SUSPENSION (ML) NASAL DAILY
Status: DISCONTINUED | OUTPATIENT
Start: 2017-08-23 | End: 2017-09-09 | Stop reason: SDUPTHER

## 2017-08-23 RX ADMIN — VANCOMYCIN HYDROCHLORIDE 1250 MG: 10 INJECTION, POWDER, LYOPHILIZED, FOR SOLUTION INTRAVENOUS at 16:45

## 2017-08-23 RX ADMIN — INSULIN LISPRO 1 UNITS: 100 INJECTION, SOLUTION INTRAVENOUS; SUBCUTANEOUS at 05:27

## 2017-08-23 RX ADMIN — FAMOTIDINE 20 MG: 10 INJECTION, SOLUTION INTRAVENOUS at 23:00

## 2017-08-23 RX ADMIN — INSULIN LISPRO 1 UNITS: 100 INJECTION, SOLUTION INTRAVENOUS; SUBCUTANEOUS at 17:14

## 2017-08-23 RX ADMIN — MEROPENEM 1 G: 1 INJECTION, POWDER, FOR SOLUTION INTRAVENOUS at 23:56

## 2017-08-23 RX ADMIN — POLYETHYLENE GLYCOL 3350 17 G: 17 POWDER, FOR SOLUTION ORAL at 08:14

## 2017-08-23 RX ADMIN — INSULIN LISPRO 1 UNITS: 100 INJECTION, SOLUTION INTRAVENOUS; SUBCUTANEOUS at 12:20

## 2017-08-23 RX ADMIN — INSULIN LISPRO 1 UNITS: 100 INJECTION, SOLUTION INTRAVENOUS; SUBCUTANEOUS at 01:12

## 2017-08-23 RX ADMIN — VANCOMYCIN HYDROCHLORIDE 1250 MG: 10 INJECTION, POWDER, LYOPHILIZED, FOR SOLUTION INTRAVENOUS at 05:20

## 2017-08-23 RX ADMIN — Medication 20 MG/HR: at 10:27

## 2017-08-23 RX ADMIN — LEVETIRACETAM 1500 MG: 500 INJECTION, SOLUTION, CONCENTRATE INTRAVENOUS at 21:29

## 2017-08-23 RX ADMIN — LEVETIRACETAM 1500 MG: 500 INJECTION, SOLUTION, CONCENTRATE INTRAVENOUS at 08:14

## 2017-08-23 RX ADMIN — MAGNESIUM GLUCONATE 500 MG ORAL TABLET 400 MG: 500 TABLET ORAL at 08:14

## 2017-08-23 RX ADMIN — DOCUSATE SODIUM -SENNOSIDES 2 TABLET: 50; 8.6 TABLET, COATED ORAL at 08:14

## 2017-08-23 RX ADMIN — FLUTICASONE PROPIONATE 1 SPRAY: 50 SPRAY, METERED NASAL at 21:30

## 2017-08-23 RX ADMIN — ALLOPURINOL 300 MG: 300 TABLET ORAL at 08:14

## 2017-08-23 RX ADMIN — MEROPENEM 1 G: 1 INJECTION, POWDER, FOR SOLUTION INTRAVENOUS at 13:30

## 2017-08-23 RX ADMIN — Medication 25 MG/HR: at 07:08

## 2017-08-23 RX ADMIN — ACETAMINOPHEN 650 MG: 325 TABLET ORAL at 12:20

## 2017-08-23 RX ADMIN — Medication 10 MG/HR: at 16:45

## 2017-08-23 RX ADMIN — SALINE NASAL SPRAY 2 DROP: 1.5 SOLUTION NASAL at 21:30

## 2017-08-23 RX ADMIN — Medication 10 ML: at 08:14

## 2017-08-23 RX ADMIN — FAMOTIDINE 20 MG: 10 INJECTION, SOLUTION INTRAVENOUS at 08:14

## 2017-08-23 RX ADMIN — Medication 25 MG/HR: at 02:38

## 2017-08-23 RX ADMIN — ACETAMINOPHEN 650 MG: 325 TABLET ORAL at 01:12

## 2017-08-23 RX ADMIN — MEROPENEM 1 G: 1 INJECTION, POWDER, FOR SOLUTION INTRAVENOUS at 05:24

## 2017-08-23 ASSESSMENT — PULMONARY FUNCTION TESTS
PIF_VALUE: 21
PIF_VALUE: 21
PIF_VALUE: 27
PIF_VALUE: 22
PIF_VALUE: 27
PIF_VALUE: 23
PIF_VALUE: 22
PIF_VALUE: 22
PIF_VALUE: 23
PIF_VALUE: 24
PIF_VALUE: 21
PIF_VALUE: 27
PIF_VALUE: 22
PIF_VALUE: 21
PIF_VALUE: 24
PIF_VALUE: 21

## 2017-08-24 ENCOUNTER — APPOINTMENT (OUTPATIENT)
Dept: GENERAL RADIOLOGY | Age: 77
DRG: 025 | End: 2017-08-24
Payer: MEDICARE

## 2017-08-24 LAB
ABSOLUTE EOS #: 0.3 K/UL (ref 0–0.4)
ABSOLUTE LYMPH #: 1.7 K/UL (ref 1–4.8)
ABSOLUTE MONO #: 1.1 K/UL (ref 0.1–1.2)
ALLEN TEST: ABNORMAL
ANION GAP SERPL CALCULATED.3IONS-SCNC: 11 MMOL/L (ref 9–17)
BASOPHILS # BLD: 0 %
BASOPHILS ABSOLUTE: 0.1 K/UL (ref 0–0.2)
BUN BLDV-MCNC: 16 MG/DL (ref 8–23)
BUN/CREAT BLD: ABNORMAL (ref 9–20)
CALCIUM SERPL-MCNC: 8.7 MG/DL (ref 8.6–10.4)
CHLORIDE BLD-SCNC: 93 MMOL/L (ref 98–107)
CO2: 27 MMOL/L (ref 20–31)
CREAT SERPL-MCNC: 0.44 MG/DL (ref 0.5–0.9)
DIFFERENTIAL TYPE: ABNORMAL
EOSINOPHILS RELATIVE PERCENT: 2 %
FIO2: 30
GFR AFRICAN AMERICAN: >60 ML/MIN
GFR NON-AFRICAN AMERICAN: >60 ML/MIN
GFR SERPL CREATININE-BSD FRML MDRD: ABNORMAL ML/MIN/{1.73_M2}
GFR SERPL CREATININE-BSD FRML MDRD: ABNORMAL ML/MIN/{1.73_M2}
GLUCOSE BLD-MCNC: 131 MG/DL (ref 65–105)
GLUCOSE BLD-MCNC: 137 MG/DL (ref 65–105)
GLUCOSE BLD-MCNC: 156 MG/DL (ref 65–105)
GLUCOSE BLD-MCNC: 159 MG/DL (ref 65–105)
GLUCOSE BLD-MCNC: 183 MG/DL (ref 65–105)
GLUCOSE BLD-MCNC: 192 MG/DL (ref 70–99)
HCT VFR BLD CALC: 27.5 % (ref 36–46)
HEMOGLOBIN: 9.1 G/DL (ref 12–16)
LYMPHOCYTES # BLD: 13 %
MAGNESIUM: 2.3 MG/DL (ref 1.6–2.6)
MCH RBC QN AUTO: 31.7 PG (ref 26–34)
MCHC RBC AUTO-ENTMCNC: 33.2 G/DL (ref 31–37)
MCV RBC AUTO: 95.6 FL (ref 80–100)
MODE: ABNORMAL
MONOCYTES # BLD: 8 %
NEGATIVE BASE EXCESS, ART: ABNORMAL (ref 0–2)
O2 DEVICE/FLOW/%: ABNORMAL
PATIENT TEMP: ABNORMAL
PDW BLD-RTO: 16.7 % (ref 12.5–15.4)
PHENYTOIN DATE LAST DOSE: NORMAL
PHENYTOIN DOSE AMOUNT: NORMAL
PHENYTOIN DOSE TIME: NORMAL
PHENYTOIN FREE: 2.5 UG/ML (ref 1–2)
PHENYTOIN LEVEL: 12.3 UG/ML (ref 10–20)
PHOSPHORUS: 3.9 MG/DL (ref 2.6–4.5)
PLATELET # BLD: 276 K/UL (ref 140–450)
PLATELET ESTIMATE: ABNORMAL
PMV BLD AUTO: 9.2 FL (ref 6–12)
POC HCO3: 29.2 MMOL/L (ref 21–28)
POC O2 SATURATION: 95 % (ref 94–98)
POC PCO2 TEMP: ABNORMAL MM HG
POC PCO2: 44.4 MM HG (ref 35–48)
POC PH TEMP: ABNORMAL
POC PH: 7.42 (ref 7.35–7.45)
POC PO2 TEMP: ABNORMAL MM HG
POC PO2: 72.9 MM HG (ref 83–108)
POSITIVE BASE EXCESS, ART: 4 (ref 0–3)
POTASSIUM SERPL-SCNC: 4.3 MMOL/L (ref 3.7–5.3)
RBC # BLD: 2.88 M/UL (ref 4–5.2)
RBC # BLD: ABNORMAL 10*6/UL
SAMPLE SITE: ABNORMAL
SEG NEUTROPHILS: 77 %
SEGMENTED NEUTROPHILS ABSOLUTE COUNT: 9.6 K/UL (ref 1.8–7.7)
SODIUM BLD-SCNC: 131 MMOL/L (ref 135–144)
TCO2 (CALC), ART: 31 MMOL/L (ref 22–29)
WBC # BLD: 12.7 K/UL (ref 3.5–11)
WBC # BLD: ABNORMAL 10*3/UL

## 2017-08-24 PROCEDURE — 2580000003 HC RX 258: Performed by: STUDENT IN AN ORGANIZED HEALTH CARE EDUCATION/TRAINING PROGRAM

## 2017-08-24 PROCEDURE — 99233 SBSQ HOSP IP/OBS HIGH 50: CPT | Performed by: PSYCHIATRY & NEUROLOGY

## 2017-08-24 PROCEDURE — 80048 BASIC METABOLIC PNL TOTAL CA: CPT

## 2017-08-24 PROCEDURE — 82803 BLOOD GASES ANY COMBINATION: CPT

## 2017-08-24 PROCEDURE — 94003 VENT MGMT INPAT SUBQ DAY: CPT

## 2017-08-24 PROCEDURE — 6360000002 HC RX W HCPCS: Performed by: STUDENT IN AN ORGANIZED HEALTH CARE EDUCATION/TRAINING PROGRAM

## 2017-08-24 PROCEDURE — 6360000002 HC RX W HCPCS: Performed by: NURSE PRACTITIONER

## 2017-08-24 PROCEDURE — 85025 COMPLETE CBC W/AUTO DIFF WBC: CPT

## 2017-08-24 PROCEDURE — 2000000003 HC NEURO ICU R&B

## 2017-08-24 PROCEDURE — 80186 ASSAY OF PHENYTOIN FREE: CPT

## 2017-08-24 PROCEDURE — 94770 HC ETCO2 MONITOR DAILY: CPT

## 2017-08-24 PROCEDURE — 84100 ASSAY OF PHOSPHORUS: CPT

## 2017-08-24 PROCEDURE — 36600 WITHDRAWAL OF ARTERIAL BLOOD: CPT

## 2017-08-24 PROCEDURE — 6370000000 HC RX 637 (ALT 250 FOR IP): Performed by: NURSE PRACTITIONER

## 2017-08-24 PROCEDURE — 6360000002 HC RX W HCPCS: Performed by: INTERNAL MEDICINE

## 2017-08-24 PROCEDURE — 82947 ASSAY GLUCOSE BLOOD QUANT: CPT

## 2017-08-24 PROCEDURE — 83735 ASSAY OF MAGNESIUM: CPT

## 2017-08-24 PROCEDURE — 6370000000 HC RX 637 (ALT 250 FOR IP): Performed by: EMERGENCY MEDICINE

## 2017-08-24 PROCEDURE — S0028 INJECTION, FAMOTIDINE, 20 MG: HCPCS | Performed by: STUDENT IN AN ORGANIZED HEALTH CARE EDUCATION/TRAINING PROGRAM

## 2017-08-24 PROCEDURE — 6370000000 HC RX 637 (ALT 250 FOR IP): Performed by: INTERNAL MEDICINE

## 2017-08-24 PROCEDURE — 94762 N-INVAS EAR/PLS OXIMTRY CONT: CPT

## 2017-08-24 PROCEDURE — 99291 CRITICAL CARE FIRST HOUR: CPT | Performed by: NEUROLOGICAL SURGERY

## 2017-08-24 PROCEDURE — 80185 ASSAY OF PHENYTOIN TOTAL: CPT

## 2017-08-24 PROCEDURE — 2500000003 HC RX 250 WO HCPCS: Performed by: STUDENT IN AN ORGANIZED HEALTH CARE EDUCATION/TRAINING PROGRAM

## 2017-08-24 PROCEDURE — 6360000002 HC RX W HCPCS

## 2017-08-24 PROCEDURE — 2580000003 HC RX 258: Performed by: EMERGENCY MEDICINE

## 2017-08-24 PROCEDURE — 6360000002 HC RX W HCPCS: Performed by: PSYCHIATRY & NEUROLOGY

## 2017-08-24 PROCEDURE — 6370000000 HC RX 637 (ALT 250 FOR IP): Performed by: STUDENT IN AN ORGANIZED HEALTH CARE EDUCATION/TRAINING PROGRAM

## 2017-08-24 PROCEDURE — 6360000002 HC RX W HCPCS: Performed by: EMERGENCY MEDICINE

## 2017-08-24 PROCEDURE — 36415 COLL VENOUS BLD VENIPUNCTURE: CPT

## 2017-08-24 PROCEDURE — 51701 INSERT BLADDER CATHETER: CPT

## 2017-08-24 PROCEDURE — 94760 N-INVAS EAR/PLS OXIMETRY 1: CPT

## 2017-08-24 PROCEDURE — 99291 CRITICAL CARE FIRST HOUR: CPT | Performed by: INTERNAL MEDICINE

## 2017-08-24 PROCEDURE — 51798 US URINE CAPACITY MEASURE: CPT

## 2017-08-24 PROCEDURE — 71010 XR CHEST PORTABLE: CPT

## 2017-08-24 PROCEDURE — 2580000003 HC RX 258: Performed by: INTERNAL MEDICINE

## 2017-08-24 RX ORDER — PHENYTOIN SODIUM 50 MG/ML
100 INJECTION, SOLUTION INTRAMUSCULAR; INTRAVENOUS EVERY 8 HOURS
Status: DISCONTINUED | OUTPATIENT
Start: 2017-08-24 | End: 2017-08-25

## 2017-08-24 RX ORDER — FUROSEMIDE 10 MG/ML
20 INJECTION INTRAMUSCULAR; INTRAVENOUS ONCE
Status: COMPLETED | OUTPATIENT
Start: 2017-08-24 | End: 2017-08-24

## 2017-08-24 RX ORDER — PROPOFOL 10 MG/ML
INJECTION, EMULSION INTRAVENOUS
Status: COMPLETED
Start: 2017-08-24 | End: 2017-08-24

## 2017-08-24 RX ORDER — SPIRONOLACTONE 25 MG/1
50 TABLET ORAL 2 TIMES DAILY
Status: DISCONTINUED | OUTPATIENT
Start: 2017-08-24 | End: 2017-09-11 | Stop reason: HOSPADM

## 2017-08-24 RX ORDER — PROPOFOL 10 MG/ML
10 INJECTION, EMULSION INTRAVENOUS
Status: DISCONTINUED | OUTPATIENT
Start: 2017-08-24 | End: 2017-08-27

## 2017-08-24 RX ORDER — HEPARIN SODIUM 5000 [USP'U]/ML
5000 INJECTION, SOLUTION INTRAVENOUS; SUBCUTANEOUS EVERY 8 HOURS SCHEDULED
Status: DISCONTINUED | OUTPATIENT
Start: 2017-08-24 | End: 2017-09-05

## 2017-08-24 RX ADMIN — Medication 10 MG/HR: at 10:25

## 2017-08-24 RX ADMIN — SALINE NASAL SPRAY 2 DROP: 1.5 SOLUTION NASAL at 08:11

## 2017-08-24 RX ADMIN — PROPOFOL 10 MCG/KG/MIN: 10 INJECTION, EMULSION INTRAVENOUS at 17:17

## 2017-08-24 RX ADMIN — Medication 10 MG/HR: at 04:00

## 2017-08-24 RX ADMIN — PHENYTOIN SODIUM 100 MG: 50 INJECTION INTRAMUSCULAR; INTRAVENOUS at 09:45

## 2017-08-24 RX ADMIN — LEVETIRACETAM 1500 MG: 500 INJECTION, SOLUTION, CONCENTRATE INTRAVENOUS at 08:10

## 2017-08-24 RX ADMIN — FUROSEMIDE 20 MG: 10 INJECTION, SOLUTION INTRAMUSCULAR; INTRAVENOUS at 10:21

## 2017-08-24 RX ADMIN — MAGNESIUM GLUCONATE 500 MG ORAL TABLET 400 MG: 500 TABLET ORAL at 08:11

## 2017-08-24 RX ADMIN — FLUTICASONE PROPIONATE 1 SPRAY: 50 SPRAY, METERED NASAL at 08:11

## 2017-08-24 RX ADMIN — HEPARIN SODIUM 5000 UNITS: 5000 INJECTION, SOLUTION INTRAVENOUS; SUBCUTANEOUS at 14:27

## 2017-08-24 RX ADMIN — INSULIN LISPRO 1 UNITS: 100 INJECTION, SOLUTION INTRAVENOUS; SUBCUTANEOUS at 06:34

## 2017-08-24 RX ADMIN — ACETAMINOPHEN 650 MG: 325 TABLET ORAL at 00:18

## 2017-08-24 RX ADMIN — SALINE NASAL SPRAY 2 DROP: 1.5 SOLUTION NASAL at 12:31

## 2017-08-24 RX ADMIN — LEVETIRACETAM 1500 MG: 500 INJECTION, SOLUTION, CONCENTRATE INTRAVENOUS at 21:12

## 2017-08-24 RX ADMIN — FENTANYL CITRATE 50 MCG: 50 INJECTION INTRAMUSCULAR; INTRAVENOUS at 02:39

## 2017-08-24 RX ADMIN — SODIUM CHLORIDE: 9 INJECTION, SOLUTION INTRAVENOUS at 04:07

## 2017-08-24 RX ADMIN — INSULIN LISPRO 1 UNITS: 100 INJECTION, SOLUTION INTRAVENOUS; SUBCUTANEOUS at 17:15

## 2017-08-24 RX ADMIN — INSULIN LISPRO 1 UNITS: 100 INJECTION, SOLUTION INTRAVENOUS; SUBCUTANEOUS at 00:36

## 2017-08-24 RX ADMIN — SODIUM CHLORIDE 3 G: 900 INJECTION INTRAVENOUS at 12:44

## 2017-08-24 RX ADMIN — Medication 10 ML: at 08:11

## 2017-08-24 RX ADMIN — ALLOPURINOL 300 MG: 300 TABLET ORAL at 08:11

## 2017-08-24 RX ADMIN — SODIUM CHLORIDE 3 G: 900 INJECTION INTRAVENOUS at 23:56

## 2017-08-24 RX ADMIN — SPIRONOLACTONE 50 MG: 25 TABLET ORAL at 20:00

## 2017-08-24 RX ADMIN — SALINE NASAL SPRAY 2 DROP: 1.5 SOLUTION NASAL at 19:55

## 2017-08-24 RX ADMIN — HEPARIN SODIUM 5000 UNITS: 5000 INJECTION, SOLUTION INTRAVENOUS; SUBCUTANEOUS at 22:27

## 2017-08-24 RX ADMIN — PHENYTOIN SODIUM 100 MG: 50 INJECTION INTRAMUSCULAR; INTRAVENOUS at 17:29

## 2017-08-24 RX ADMIN — FAMOTIDINE 20 MG: 10 INJECTION, SOLUTION INTRAVENOUS at 08:10

## 2017-08-24 RX ADMIN — DOCUSATE SODIUM -SENNOSIDES 2 TABLET: 50; 8.6 TABLET, COATED ORAL at 08:10

## 2017-08-24 RX ADMIN — FAMOTIDINE 20 MG: 10 INJECTION, SOLUTION INTRAVENOUS at 19:55

## 2017-08-24 RX ADMIN — ONDANSETRON 4 MG: 2 INJECTION INTRAMUSCULAR; INTRAVENOUS at 00:29

## 2017-08-24 RX ADMIN — MEROPENEM 1 G: 1 INJECTION, POWDER, FOR SOLUTION INTRAVENOUS at 06:25

## 2017-08-24 RX ADMIN — SODIUM CHLORIDE 3 G: 900 INJECTION INTRAVENOUS at 17:19

## 2017-08-24 RX ADMIN — POLYETHYLENE GLYCOL 3350 17 G: 17 POWDER, FOR SOLUTION ORAL at 08:10

## 2017-08-24 RX ADMIN — SALINE NASAL SPRAY 2 DROP: 1.5 SOLUTION NASAL at 17:19

## 2017-08-24 ASSESSMENT — PULMONARY FUNCTION TESTS
PIF_VALUE: 27
PIF_VALUE: 24
PIF_VALUE: 13
PIF_VALUE: 22
PIF_VALUE: 14
PIF_VALUE: 27
PIF_VALUE: 32
PIF_VALUE: 14
PIF_VALUE: 22
PIF_VALUE: 26
PIF_VALUE: 22
PIF_VALUE: 23
PIF_VALUE: 25
PIF_VALUE: 24
PIF_VALUE: 22
PIF_VALUE: 21
PIF_VALUE: 14

## 2017-08-25 ENCOUNTER — APPOINTMENT (OUTPATIENT)
Dept: GENERAL RADIOLOGY | Age: 77
DRG: 025 | End: 2017-08-25
Payer: MEDICARE

## 2017-08-25 PROBLEM — I31.39 PERICARDIAL EFFUSION: Status: ACTIVE | Noted: 2017-08-25

## 2017-08-25 LAB
ABSOLUTE EOS #: 0.2 K/UL (ref 0–0.4)
ABSOLUTE LYMPH #: 1.7 K/UL (ref 1–4.8)
ABSOLUTE MONO #: 1.1 K/UL (ref 0.1–1.2)
ALLEN TEST: POSITIVE
ANION GAP SERPL CALCULATED.3IONS-SCNC: 12 MMOL/L (ref 9–17)
BASOPHILS # BLD: 1 %
BASOPHILS ABSOLUTE: 0 K/UL (ref 0–0.2)
BUN BLDV-MCNC: 18 MG/DL (ref 8–23)
BUN/CREAT BLD: ABNORMAL (ref 9–20)
CALCIUM SERPL-MCNC: 8.7 MG/DL (ref 8.6–10.4)
CHLORIDE BLD-SCNC: 92 MMOL/L (ref 98–107)
CO2: 30 MMOL/L (ref 20–31)
CREAT SERPL-MCNC: 0.61 MG/DL (ref 0.5–0.9)
CULTURE: NORMAL
DIFFERENTIAL TYPE: ABNORMAL
EOSINOPHILS RELATIVE PERCENT: 2 %
FIO2: 30
GFR AFRICAN AMERICAN: >60 ML/MIN
GFR NON-AFRICAN AMERICAN: >60 ML/MIN
GFR SERPL CREATININE-BSD FRML MDRD: ABNORMAL ML/MIN/{1.73_M2}
GFR SERPL CREATININE-BSD FRML MDRD: ABNORMAL ML/MIN/{1.73_M2}
GLUCOSE BLD-MCNC: 147 MG/DL (ref 65–105)
GLUCOSE BLD-MCNC: 159 MG/DL (ref 65–105)
GLUCOSE BLD-MCNC: 165 MG/DL (ref 65–105)
GLUCOSE BLD-MCNC: 171 MG/DL (ref 65–105)
GLUCOSE BLD-MCNC: 180 MG/DL (ref 70–99)
HCT VFR BLD CALC: 27.3 % (ref 36–46)
HEMOGLOBIN: 9.2 G/DL (ref 12–16)
LV EF: 28 %
LVEF MODALITY: NORMAL
LYMPHOCYTES # BLD: 17 %
Lab: NORMAL
Lab: NORMAL
MAGNESIUM: 2 MG/DL (ref 1.6–2.6)
MCH RBC QN AUTO: 32.3 PG (ref 26–34)
MCHC RBC AUTO-ENTMCNC: 33.7 G/DL (ref 31–37)
MCV RBC AUTO: 95.9 FL (ref 80–100)
MODE: ABNORMAL
MONOCYTES # BLD: 10 %
NEGATIVE BASE EXCESS, ART: ABNORMAL (ref 0–2)
O2 DEVICE/FLOW/%: ABNORMAL
PATIENT TEMP: ABNORMAL
PDW BLD-RTO: 16.3 % (ref 12.5–15.4)
PHENYTOIN DATE LAST DOSE: ABNORMAL
PHENYTOIN DOSE AMOUNT: ABNORMAL
PHENYTOIN DOSE TIME: ABNORMAL
PHENYTOIN FREE: 2.8 UG/ML (ref 1–2)
PHENYTOIN LEVEL: 14.4 UG/ML (ref 10–20)
PHOSPHORUS: 3.3 MG/DL (ref 2.6–4.5)
PLATELET # BLD: 314 K/UL (ref 140–450)
PLATELET ESTIMATE: ABNORMAL
PMV BLD AUTO: 8.3 FL (ref 6–12)
POC HCO3: 34.4 MMOL/L (ref 21–28)
POC O2 SATURATION: 95 % (ref 94–98)
POC PCO2 TEMP: ABNORMAL MM HG
POC PCO2: 43.4 MM HG (ref 35–48)
POC PH TEMP: ABNORMAL
POC PH: 7.51 (ref 7.35–7.45)
POC PO2 TEMP: ABNORMAL MM HG
POC PO2: 70.5 MM HG (ref 83–108)
POSITIVE BASE EXCESS, ART: 10 (ref 0–3)
POTASSIUM SERPL-SCNC: 3.9 MMOL/L (ref 3.7–5.3)
RBC # BLD: 2.85 M/UL (ref 4–5.2)
RBC # BLD: ABNORMAL 10*6/UL
SAMPLE SITE: ABNORMAL
SEG NEUTROPHILS: 70 %
SEGMENTED NEUTROPHILS ABSOLUTE COUNT: 7.1 K/UL (ref 1.8–7.7)
SODIUM BLD-SCNC: 134 MMOL/L (ref 135–144)
SPECIMEN DESCRIPTION: NORMAL
SPECIMEN DESCRIPTION: NORMAL
STATUS: NORMAL
STATUS: NORMAL
TCO2 (CALC), ART: 36 MMOL/L (ref 22–29)
WBC # BLD: 10.2 K/UL (ref 3.5–11)
WBC # BLD: ABNORMAL 10*3/UL

## 2017-08-25 PROCEDURE — 6370000000 HC RX 637 (ALT 250 FOR IP): Performed by: EMERGENCY MEDICINE

## 2017-08-25 PROCEDURE — 76937 US GUIDE VASCULAR ACCESS: CPT

## 2017-08-25 PROCEDURE — 71010 XR CHEST PORTABLE: CPT

## 2017-08-25 PROCEDURE — 2500000003 HC RX 250 WO HCPCS: Performed by: NEUROLOGICAL SURGERY

## 2017-08-25 PROCEDURE — 6360000002 HC RX W HCPCS: Performed by: PSYCHIATRY & NEUROLOGY

## 2017-08-25 PROCEDURE — 36600 WITHDRAWAL OF ARTERIAL BLOOD: CPT

## 2017-08-25 PROCEDURE — 6370000000 HC RX 637 (ALT 250 FOR IP): Performed by: NEUROLOGICAL SURGERY

## 2017-08-25 PROCEDURE — 82947 ASSAY GLUCOSE BLOOD QUANT: CPT

## 2017-08-25 PROCEDURE — 83735 ASSAY OF MAGNESIUM: CPT

## 2017-08-25 PROCEDURE — 2580000003 HC RX 258: Performed by: INTERNAL MEDICINE

## 2017-08-25 PROCEDURE — 36415 COLL VENOUS BLD VENIPUNCTURE: CPT

## 2017-08-25 PROCEDURE — 94770 HC ETCO2 MONITOR DAILY: CPT

## 2017-08-25 PROCEDURE — C1751 CATH, INF, PER/CENT/MIDLINE: HCPCS

## 2017-08-25 PROCEDURE — 6360000002 HC RX W HCPCS: Performed by: EMERGENCY MEDICINE

## 2017-08-25 PROCEDURE — 80185 ASSAY OF PHENYTOIN TOTAL: CPT

## 2017-08-25 PROCEDURE — 6360000002 HC RX W HCPCS: Performed by: NURSE PRACTITIONER

## 2017-08-25 PROCEDURE — 6370000000 HC RX 637 (ALT 250 FOR IP): Performed by: NURSE PRACTITIONER

## 2017-08-25 PROCEDURE — 6360000002 HC RX W HCPCS: Performed by: NEUROLOGICAL SURGERY

## 2017-08-25 PROCEDURE — 36569 INSJ PICC 5 YR+ W/O IMAGING: CPT

## 2017-08-25 PROCEDURE — 02HV33Z INSERTION OF INFUSION DEVICE INTO SUPERIOR VENA CAVA, PERCUTANEOUS APPROACH: ICD-10-PCS | Performed by: NEUROLOGICAL SURGERY

## 2017-08-25 PROCEDURE — 80186 ASSAY OF PHENYTOIN FREE: CPT

## 2017-08-25 PROCEDURE — 6360000002 HC RX W HCPCS: Performed by: INTERNAL MEDICINE

## 2017-08-25 PROCEDURE — 84100 ASSAY OF PHOSPHORUS: CPT

## 2017-08-25 PROCEDURE — 80048 BASIC METABOLIC PNL TOTAL CA: CPT

## 2017-08-25 PROCEDURE — 93308 TTE F-UP OR LMTD: CPT

## 2017-08-25 PROCEDURE — 2500000003 HC RX 250 WO HCPCS: Performed by: STUDENT IN AN ORGANIZED HEALTH CARE EDUCATION/TRAINING PROGRAM

## 2017-08-25 PROCEDURE — 2000000003 HC NEURO ICU R&B

## 2017-08-25 PROCEDURE — 82803 BLOOD GASES ANY COMBINATION: CPT

## 2017-08-25 PROCEDURE — 6370000000 HC RX 637 (ALT 250 FOR IP): Performed by: INTERNAL MEDICINE

## 2017-08-25 PROCEDURE — 99291 CRITICAL CARE FIRST HOUR: CPT | Performed by: NEUROLOGICAL SURGERY

## 2017-08-25 PROCEDURE — 99291 CRITICAL CARE FIRST HOUR: CPT | Performed by: INTERNAL MEDICINE

## 2017-08-25 PROCEDURE — S0028 INJECTION, FAMOTIDINE, 20 MG: HCPCS | Performed by: STUDENT IN AN ORGANIZED HEALTH CARE EDUCATION/TRAINING PROGRAM

## 2017-08-25 PROCEDURE — 94762 N-INVAS EAR/PLS OXIMTRY CONT: CPT

## 2017-08-25 PROCEDURE — 2580000003 HC RX 258: Performed by: NEUROLOGICAL SURGERY

## 2017-08-25 PROCEDURE — 94003 VENT MGMT INPAT SUBQ DAY: CPT

## 2017-08-25 PROCEDURE — B548ZZA ULTRASONOGRAPHY OF SUPERIOR VENA CAVA, GUIDANCE: ICD-10-PCS | Performed by: NEUROLOGICAL SURGERY

## 2017-08-25 PROCEDURE — 85025 COMPLETE CBC W/AUTO DIFF WBC: CPT

## 2017-08-25 PROCEDURE — 51702 INSERT TEMP BLADDER CATH: CPT

## 2017-08-25 PROCEDURE — 2580000003 HC RX 258: Performed by: EMERGENCY MEDICINE

## 2017-08-25 PROCEDURE — 6370000000 HC RX 637 (ALT 250 FOR IP): Performed by: STUDENT IN AN ORGANIZED HEALTH CARE EDUCATION/TRAINING PROGRAM

## 2017-08-25 PROCEDURE — 93005 ELECTROCARDIOGRAM TRACING: CPT

## 2017-08-25 PROCEDURE — 99233 SBSQ HOSP IP/OBS HIGH 50: CPT | Performed by: PSYCHIATRY & NEUROLOGY

## 2017-08-25 RX ORDER — AMOXICILLIN AND CLAVULANATE POTASSIUM 875; 125 MG/1; MG/1
1 TABLET, FILM COATED ORAL EVERY 12 HOURS SCHEDULED
Status: DISCONTINUED | OUTPATIENT
Start: 2017-08-25 | End: 2017-08-28

## 2017-08-25 RX ORDER — PHENYTOIN SODIUM 50 MG/ML
75 INJECTION, SOLUTION INTRAMUSCULAR; INTRAVENOUS EVERY 8 HOURS
Status: DISCONTINUED | OUTPATIENT
Start: 2017-08-25 | End: 2017-08-28

## 2017-08-25 RX ORDER — FUROSEMIDE 10 MG/ML
20 INJECTION INTRAMUSCULAR; INTRAVENOUS ONCE
Status: COMPLETED | OUTPATIENT
Start: 2017-08-25 | End: 2017-08-25

## 2017-08-25 RX ORDER — LIDOCAINE HYDROCHLORIDE 10 MG/ML
5 INJECTION, SOLUTION EPIDURAL; INFILTRATION; INTRACAUDAL; PERINEURAL ONCE
Status: COMPLETED | OUTPATIENT
Start: 2017-08-25 | End: 2017-08-25

## 2017-08-25 RX ORDER — POTASSIUM CHLORIDE 20MEQ/15ML
40 LIQUID (ML) ORAL DAILY
Status: DISCONTINUED | OUTPATIENT
Start: 2017-08-25 | End: 2017-09-06

## 2017-08-25 RX ORDER — SODIUM CHLORIDE 0.9 % (FLUSH) 0.9 %
10 SYRINGE (ML) INJECTION EVERY 12 HOURS SCHEDULED
Status: DISCONTINUED | OUTPATIENT
Start: 2017-08-25 | End: 2017-09-11 | Stop reason: HOSPADM

## 2017-08-25 RX ORDER — CARVEDILOL 3.12 MG/1
3.12 TABLET ORAL 2 TIMES DAILY WITH MEALS
Status: DISCONTINUED | OUTPATIENT
Start: 2017-08-25 | End: 2017-09-01

## 2017-08-25 RX ORDER — SODIUM CHLORIDE 0.9 % (FLUSH) 0.9 %
10 SYRINGE (ML) INJECTION PRN
Status: DISCONTINUED | OUTPATIENT
Start: 2017-08-25 | End: 2017-09-11 | Stop reason: HOSPADM

## 2017-08-25 RX ADMIN — SPIRONOLACTONE 50 MG: 25 TABLET ORAL at 08:28

## 2017-08-25 RX ADMIN — ALLOPURINOL 300 MG: 300 TABLET ORAL at 08:28

## 2017-08-25 RX ADMIN — HEPARIN SODIUM 5000 UNITS: 5000 INJECTION, SOLUTION INTRAVENOUS; SUBCUTANEOUS at 06:50

## 2017-08-25 RX ADMIN — Medication 10 ML: at 21:00

## 2017-08-25 RX ADMIN — SALINE NASAL SPRAY 2 DROP: 1.5 SOLUTION NASAL at 20:48

## 2017-08-25 RX ADMIN — INSULIN LISPRO 1 UNITS: 100 INJECTION, SOLUTION INTRAVENOUS; SUBCUTANEOUS at 13:06

## 2017-08-25 RX ADMIN — FAMOTIDINE 20 MG: 10 INJECTION, SOLUTION INTRAVENOUS at 21:39

## 2017-08-25 RX ADMIN — HEPARIN SODIUM 5000 UNITS: 5000 INJECTION, SOLUTION INTRAVENOUS; SUBCUTANEOUS at 16:37

## 2017-08-25 RX ADMIN — LEVETIRACETAM 1500 MG: 500 INJECTION, SOLUTION, CONCENTRATE INTRAVENOUS at 21:38

## 2017-08-25 RX ADMIN — PHENYTOIN SODIUM 100 MG: 50 INJECTION INTRAMUSCULAR; INTRAVENOUS at 08:28

## 2017-08-25 RX ADMIN — FLUTICASONE PROPIONATE 1 SPRAY: 50 SPRAY, METERED NASAL at 08:29

## 2017-08-25 RX ADMIN — HEPARIN SODIUM 5000 UNITS: 5000 INJECTION, SOLUTION INTRAVENOUS; SUBCUTANEOUS at 20:47

## 2017-08-25 RX ADMIN — SALINE NASAL SPRAY 2 DROP: 1.5 SOLUTION NASAL at 13:13

## 2017-08-25 RX ADMIN — DOCUSATE SODIUM -SENNOSIDES 2 TABLET: 50; 8.6 TABLET, COATED ORAL at 08:28

## 2017-08-25 RX ADMIN — CARVEDILOL 3.12 MG: 3.12 TABLET, FILM COATED ORAL at 16:37

## 2017-08-25 RX ADMIN — SPIRONOLACTONE 50 MG: 25 TABLET ORAL at 21:00

## 2017-08-25 RX ADMIN — MAGNESIUM GLUCONATE 500 MG ORAL TABLET 400 MG: 500 TABLET ORAL at 08:28

## 2017-08-25 RX ADMIN — SALINE NASAL SPRAY 2 DROP: 1.5 SOLUTION NASAL at 08:29

## 2017-08-25 RX ADMIN — INSULIN LISPRO 1 UNITS: 100 INJECTION, SOLUTION INTRAVENOUS; SUBCUTANEOUS at 00:01

## 2017-08-25 RX ADMIN — SODIUM CHLORIDE 3 G: 900 INJECTION INTRAVENOUS at 18:01

## 2017-08-25 RX ADMIN — INSULIN LISPRO 1 UNITS: 100 INJECTION, SOLUTION INTRAVENOUS; SUBCUTANEOUS at 06:29

## 2017-08-25 RX ADMIN — FAMOTIDINE 20 MG: 10 INJECTION, SOLUTION INTRAVENOUS at 08:28

## 2017-08-25 RX ADMIN — LIDOCAINE HYDROCHLORIDE 5 ML: 10 INJECTION, SOLUTION EPIDURAL; INFILTRATION; INTRACAUDAL; PERINEURAL at 16:26

## 2017-08-25 RX ADMIN — SODIUM CHLORIDE 3 G: 900 INJECTION INTRAVENOUS at 13:05

## 2017-08-25 RX ADMIN — FUROSEMIDE 20 MG: 10 INJECTION, SOLUTION INTRAVENOUS at 13:06

## 2017-08-25 RX ADMIN — FENTANYL CITRATE 50 MCG: 50 INJECTION INTRAMUSCULAR; INTRAVENOUS at 13:42

## 2017-08-25 RX ADMIN — INSULIN LISPRO 1 UNITS: 100 INJECTION, SOLUTION INTRAVENOUS; SUBCUTANEOUS at 17:40

## 2017-08-25 RX ADMIN — POLYETHYLENE GLYCOL 3350 17 G: 17 POWDER, FOR SOLUTION ORAL at 08:28

## 2017-08-25 RX ADMIN — LEVETIRACETAM 1500 MG: 500 INJECTION, SOLUTION, CONCENTRATE INTRAVENOUS at 08:34

## 2017-08-25 RX ADMIN — PHENYTOIN SODIUM 100 MG: 50 INJECTION INTRAMUSCULAR; INTRAVENOUS at 01:07

## 2017-08-25 RX ADMIN — SALINE NASAL SPRAY 2 DROP: 1.5 SOLUTION NASAL at 16:38

## 2017-08-25 RX ADMIN — PHENYTOIN SODIUM 75 MG: 50 INJECTION INTRAMUSCULAR; INTRAVENOUS at 16:37

## 2017-08-25 RX ADMIN — SODIUM CHLORIDE 3 G: 900 INJECTION INTRAVENOUS at 06:24

## 2017-08-25 RX ADMIN — POTASSIUM CHLORIDE 40 MEQ: 40 SOLUTION ORAL at 13:05

## 2017-08-25 ASSESSMENT — PULMONARY FUNCTION TESTS
PIF_VALUE: 24
PIF_VALUE: 20
PIF_VALUE: 24
PIF_VALUE: 11
PIF_VALUE: 23
PIF_VALUE: 23
PIF_VALUE: 26

## 2017-08-26 ENCOUNTER — APPOINTMENT (OUTPATIENT)
Dept: GENERAL RADIOLOGY | Age: 77
DRG: 025 | End: 2017-08-26
Payer: MEDICARE

## 2017-08-26 ENCOUNTER — APPOINTMENT (OUTPATIENT)
Dept: CT IMAGING | Age: 77
DRG: 025 | End: 2017-08-26
Payer: MEDICARE

## 2017-08-26 LAB
ABSOLUTE EOS #: 0.3 K/UL (ref 0–0.4)
ABSOLUTE LYMPH #: 1.1 K/UL (ref 1–4.8)
ABSOLUTE MONO #: 1.2 K/UL (ref 0.1–1.2)
ALLEN TEST: POSITIVE
ANION GAP SERPL CALCULATED.3IONS-SCNC: 13 MMOL/L (ref 9–17)
BASOPHILS # BLD: 1 %
BASOPHILS ABSOLUTE: 0.1 K/UL (ref 0–0.2)
BUN BLDV-MCNC: 16 MG/DL (ref 8–23)
BUN/CREAT BLD: ABNORMAL (ref 9–20)
CALCIUM SERPL-MCNC: 8.8 MG/DL (ref 8.6–10.4)
CHLORIDE BLD-SCNC: 95 MMOL/L (ref 98–107)
CO2: 26 MMOL/L (ref 20–31)
CREAT SERPL-MCNC: 0.62 MG/DL (ref 0.5–0.9)
DIFFERENTIAL TYPE: ABNORMAL
EKG ATRIAL RATE: 103 BPM
EKG P AXIS: 40 DEGREES
EKG P-R INTERVAL: 274 MS
EKG Q-T INTERVAL: 354 MS
EKG QRS DURATION: 106 MS
EKG QTC CALCULATION (BAZETT): 463 MS
EKG R AXIS: -52 DEGREES
EKG T AXIS: 10 DEGREES
EKG VENTRICULAR RATE: 103 BPM
EOSINOPHILS RELATIVE PERCENT: 3 %
FIO2: 30
GFR AFRICAN AMERICAN: >60 ML/MIN
GFR NON-AFRICAN AMERICAN: >60 ML/MIN
GFR SERPL CREATININE-BSD FRML MDRD: ABNORMAL ML/MIN/{1.73_M2}
GFR SERPL CREATININE-BSD FRML MDRD: ABNORMAL ML/MIN/{1.73_M2}
GLUCOSE BLD-MCNC: 139 MG/DL (ref 65–105)
GLUCOSE BLD-MCNC: 145 MG/DL (ref 65–105)
GLUCOSE BLD-MCNC: 174 MG/DL (ref 65–105)
GLUCOSE BLD-MCNC: 218 MG/DL (ref 70–99)
GLUCOSE BLD-MCNC: 226 MG/DL (ref 65–105)
HCT VFR BLD CALC: 29.3 % (ref 36–46)
HEMOGLOBIN: 9.7 G/DL (ref 12–16)
LYMPHOCYTES # BLD: 10 %
MAGNESIUM: 1.9 MG/DL (ref 1.6–2.6)
MCH RBC QN AUTO: 31.6 PG (ref 26–34)
MCHC RBC AUTO-ENTMCNC: 33.1 G/DL (ref 31–37)
MCV RBC AUTO: 95.5 FL (ref 80–100)
MODE: ABNORMAL
MONOCYTES # BLD: 10 %
NEGATIVE BASE EXCESS, ART: ABNORMAL (ref 0–2)
O2 DEVICE/FLOW/%: ABNORMAL
PATIENT TEMP: ABNORMAL
PDW BLD-RTO: 16.6 % (ref 12.5–15.4)
PHENYTOIN DATE LAST DOSE: NORMAL
PHENYTOIN DOSE AMOUNT: NORMAL
PHENYTOIN DOSE TIME: NORMAL
PHENYTOIN FREE: 2.3 UG/ML (ref 1–2)
PHENYTOIN LEVEL: 14.4 UG/ML (ref 10–20)
PHOSPHORUS: 4.1 MG/DL (ref 2.6–4.5)
PLATELET # BLD: 435 K/UL (ref 140–450)
PLATELET ESTIMATE: ABNORMAL
PMV BLD AUTO: 8 FL (ref 6–12)
POC HCO3: 34.6 MMOL/L (ref 21–28)
POC O2 SATURATION: 96 % (ref 94–98)
POC PCO2 TEMP: ABNORMAL MM HG
POC PCO2: 45 MM HG (ref 35–48)
POC PH TEMP: ABNORMAL
POC PH: 7.49 (ref 7.35–7.45)
POC PO2 TEMP: ABNORMAL MM HG
POC PO2: 77.9 MM HG (ref 83–108)
POSITIVE BASE EXCESS, ART: 10 (ref 0–3)
POTASSIUM SERPL-SCNC: 4.4 MMOL/L (ref 3.7–5.3)
RBC # BLD: 3.06 M/UL (ref 4–5.2)
RBC # BLD: ABNORMAL 10*6/UL
SAMPLE SITE: ABNORMAL
SEG NEUTROPHILS: 76 %
SEGMENTED NEUTROPHILS ABSOLUTE COUNT: 9.1 K/UL (ref 1.8–7.7)
SODIUM BLD-SCNC: 134 MMOL/L (ref 135–144)
TCO2 (CALC), ART: 36 MMOL/L (ref 22–29)
TRIGL SERPL-MCNC: 237 MG/DL
WBC # BLD: 11.8 K/UL (ref 3.5–11)
WBC # BLD: ABNORMAL 10*3/UL

## 2017-08-26 PROCEDURE — 36415 COLL VENOUS BLD VENIPUNCTURE: CPT

## 2017-08-26 PROCEDURE — 6370000000 HC RX 637 (ALT 250 FOR IP): Performed by: NEUROLOGICAL SURGERY

## 2017-08-26 PROCEDURE — 6370000000 HC RX 637 (ALT 250 FOR IP): Performed by: EMERGENCY MEDICINE

## 2017-08-26 PROCEDURE — 6370000000 HC RX 637 (ALT 250 FOR IP): Performed by: INTERNAL MEDICINE

## 2017-08-26 PROCEDURE — 99291 CRITICAL CARE FIRST HOUR: CPT | Performed by: INTERNAL MEDICINE

## 2017-08-26 PROCEDURE — 99291 CRITICAL CARE FIRST HOUR: CPT | Performed by: NEUROLOGICAL SURGERY

## 2017-08-26 PROCEDURE — 83735 ASSAY OF MAGNESIUM: CPT

## 2017-08-26 PROCEDURE — 6360000002 HC RX W HCPCS: Performed by: NURSE PRACTITIONER

## 2017-08-26 PROCEDURE — 85025 COMPLETE CBC W/AUTO DIFF WBC: CPT

## 2017-08-26 PROCEDURE — 6360000002 HC RX W HCPCS: Performed by: PSYCHIATRY & NEUROLOGY

## 2017-08-26 PROCEDURE — 6370000000 HC RX 637 (ALT 250 FOR IP): Performed by: STUDENT IN AN ORGANIZED HEALTH CARE EDUCATION/TRAINING PROGRAM

## 2017-08-26 PROCEDURE — 94003 VENT MGMT INPAT SUBQ DAY: CPT

## 2017-08-26 PROCEDURE — 84100 ASSAY OF PHOSPHORUS: CPT

## 2017-08-26 PROCEDURE — 94762 N-INVAS EAR/PLS OXIMTRY CONT: CPT

## 2017-08-26 PROCEDURE — 94770 HC ETCO2 MONITOR DAILY: CPT

## 2017-08-26 PROCEDURE — 80186 ASSAY OF PHENYTOIN FREE: CPT

## 2017-08-26 PROCEDURE — 2000000003 HC NEURO ICU R&B

## 2017-08-26 PROCEDURE — 71010 XR CHEST PORTABLE: CPT

## 2017-08-26 PROCEDURE — 82947 ASSAY GLUCOSE BLOOD QUANT: CPT

## 2017-08-26 PROCEDURE — 6360000002 HC RX W HCPCS: Performed by: EMERGENCY MEDICINE

## 2017-08-26 PROCEDURE — 70450 CT HEAD/BRAIN W/O DYE: CPT

## 2017-08-26 PROCEDURE — 80048 BASIC METABOLIC PNL TOTAL CA: CPT

## 2017-08-26 PROCEDURE — 84478 ASSAY OF TRIGLYCERIDES: CPT

## 2017-08-26 PROCEDURE — 2580000003 HC RX 258: Performed by: EMERGENCY MEDICINE

## 2017-08-26 PROCEDURE — 82803 BLOOD GASES ANY COMBINATION: CPT

## 2017-08-26 PROCEDURE — 6370000000 HC RX 637 (ALT 250 FOR IP): Performed by: NURSE PRACTITIONER

## 2017-08-26 PROCEDURE — 99233 SBSQ HOSP IP/OBS HIGH 50: CPT | Performed by: PSYCHIATRY & NEUROLOGY

## 2017-08-26 PROCEDURE — 80185 ASSAY OF PHENYTOIN TOTAL: CPT

## 2017-08-26 PROCEDURE — S0028 INJECTION, FAMOTIDINE, 20 MG: HCPCS | Performed by: STUDENT IN AN ORGANIZED HEALTH CARE EDUCATION/TRAINING PROGRAM

## 2017-08-26 PROCEDURE — 6360000002 HC RX W HCPCS: Performed by: NEUROLOGICAL SURGERY

## 2017-08-26 PROCEDURE — 2500000003 HC RX 250 WO HCPCS: Performed by: STUDENT IN AN ORGANIZED HEALTH CARE EDUCATION/TRAINING PROGRAM

## 2017-08-26 RX ADMIN — INSULIN LISPRO 1 UNITS: 100 INJECTION, SOLUTION INTRAVENOUS; SUBCUTANEOUS at 06:06

## 2017-08-26 RX ADMIN — MAGNESIUM GLUCONATE 500 MG ORAL TABLET 400 MG: 500 TABLET ORAL at 09:23

## 2017-08-26 RX ADMIN — AMOXICILLIN AND CLAVULANATE POTASSIUM 1 TABLET: 875; 125 TABLET, FILM COATED ORAL at 09:23

## 2017-08-26 RX ADMIN — FAMOTIDINE 20 MG: 10 INJECTION, SOLUTION INTRAVENOUS at 21:28

## 2017-08-26 RX ADMIN — POLYETHYLENE GLYCOL 3350 17 G: 17 POWDER, FOR SOLUTION ORAL at 09:23

## 2017-08-26 RX ADMIN — PROPOFOL 20 MCG/KG/MIN: 10 INJECTION, EMULSION INTRAVENOUS at 05:00

## 2017-08-26 RX ADMIN — SALINE NASAL SPRAY 2 DROP: 1.5 SOLUTION NASAL at 09:23

## 2017-08-26 RX ADMIN — PHENYTOIN SODIUM 75 MG: 50 INJECTION INTRAMUSCULAR; INTRAVENOUS at 17:18

## 2017-08-26 RX ADMIN — FLUTICASONE PROPIONATE 1 SPRAY: 50 SPRAY, METERED NASAL at 09:23

## 2017-08-26 RX ADMIN — PHENYTOIN SODIUM 75 MG: 50 INJECTION INTRAMUSCULAR; INTRAVENOUS at 01:16

## 2017-08-26 RX ADMIN — INSULIN LISPRO 1 UNITS: 100 INJECTION, SOLUTION INTRAVENOUS; SUBCUTANEOUS at 00:06

## 2017-08-26 RX ADMIN — HEPARIN SODIUM 5000 UNITS: 5000 INJECTION, SOLUTION INTRAVENOUS; SUBCUTANEOUS at 06:06

## 2017-08-26 RX ADMIN — LEVETIRACETAM 1500 MG: 500 INJECTION, SOLUTION, CONCENTRATE INTRAVENOUS at 19:53

## 2017-08-26 RX ADMIN — SPIRONOLACTONE 50 MG: 25 TABLET ORAL at 09:23

## 2017-08-26 RX ADMIN — PHENYTOIN SODIUM 75 MG: 50 INJECTION INTRAMUSCULAR; INTRAVENOUS at 09:23

## 2017-08-26 RX ADMIN — HEPARIN SODIUM 5000 UNITS: 5000 INJECTION, SOLUTION INTRAVENOUS; SUBCUTANEOUS at 22:20

## 2017-08-26 RX ADMIN — DOCUSATE SODIUM -SENNOSIDES 2 TABLET: 50; 8.6 TABLET, COATED ORAL at 09:23

## 2017-08-26 RX ADMIN — FAMOTIDINE 20 MG: 10 INJECTION, SOLUTION INTRAVENOUS at 09:23

## 2017-08-26 RX ADMIN — SPIRONOLACTONE 50 MG: 25 TABLET ORAL at 21:29

## 2017-08-26 RX ADMIN — FENTANYL CITRATE 50 MCG: 50 INJECTION INTRAMUSCULAR; INTRAVENOUS at 13:42

## 2017-08-26 RX ADMIN — POTASSIUM CHLORIDE 40 MEQ: 40 SOLUTION ORAL at 09:22

## 2017-08-26 RX ADMIN — AMOXICILLIN AND CLAVULANATE POTASSIUM 1 TABLET: 875; 125 TABLET, FILM COATED ORAL at 01:16

## 2017-08-26 RX ADMIN — SALINE NASAL SPRAY 2 DROP: 1.5 SOLUTION NASAL at 13:29

## 2017-08-26 RX ADMIN — INSULIN LISPRO 2 UNITS: 100 INJECTION, SOLUTION INTRAVENOUS; SUBCUTANEOUS at 13:51

## 2017-08-26 RX ADMIN — SALINE NASAL SPRAY 2 DROP: 1.5 SOLUTION NASAL at 19:54

## 2017-08-26 RX ADMIN — LEVETIRACETAM 1500 MG: 500 INJECTION, SOLUTION, CONCENTRATE INTRAVENOUS at 09:24

## 2017-08-26 RX ADMIN — AMOXICILLIN AND CLAVULANATE POTASSIUM 1 TABLET: 875; 125 TABLET, FILM COATED ORAL at 21:29

## 2017-08-26 RX ADMIN — HEPARIN SODIUM 5000 UNITS: 5000 INJECTION, SOLUTION INTRAVENOUS; SUBCUTANEOUS at 17:18

## 2017-08-26 RX ADMIN — CARVEDILOL 3.12 MG: 3.12 TABLET, FILM COATED ORAL at 09:23

## 2017-08-26 RX ADMIN — CARVEDILOL 3.12 MG: 3.12 TABLET, FILM COATED ORAL at 17:18

## 2017-08-26 RX ADMIN — SALINE NASAL SPRAY 2 DROP: 1.5 SOLUTION NASAL at 16:00

## 2017-08-26 RX ADMIN — ALLOPURINOL 300 MG: 300 TABLET ORAL at 13:29

## 2017-08-26 ASSESSMENT — PULMONARY FUNCTION TESTS
PIF_VALUE: 12
PIF_VALUE: 20
PIF_VALUE: 21
PIF_VALUE: 25
PIF_VALUE: 21
PIF_VALUE: 21
PIF_VALUE: 12
PIF_VALUE: 12
PIF_VALUE: 33
PIF_VALUE: 27
PIF_VALUE: 22
PIF_VALUE: 19
PIF_VALUE: 11
PIF_VALUE: 12
PIF_VALUE: 21
PIF_VALUE: 22
PIF_VALUE: 12

## 2017-08-26 ASSESSMENT — PAIN SCALES - GENERAL
PAINLEVEL_OUTOF10: 8
PAINLEVEL_OUTOF10: 0

## 2017-08-27 ENCOUNTER — APPOINTMENT (OUTPATIENT)
Dept: GENERAL RADIOLOGY | Age: 77
DRG: 025 | End: 2017-08-27
Payer: MEDICARE

## 2017-08-27 LAB
ABSOLUTE EOS #: 0.14 K/UL (ref 0–0.4)
ABSOLUTE LYMPH #: 2.26 K/UL (ref 1–4.8)
ABSOLUTE MONO #: 0.85 K/UL (ref 0.1–0.8)
ALLEN TEST: POSITIVE
ANION GAP SERPL CALCULATED.3IONS-SCNC: 15 MMOL/L (ref 9–17)
BASOPHILS # BLD: 0 %
BASOPHILS ABSOLUTE: 0 K/UL (ref 0–0.2)
BUN BLDV-MCNC: 15 MG/DL (ref 8–23)
BUN/CREAT BLD: ABNORMAL (ref 9–20)
CALCIUM SERPL-MCNC: 8.9 MG/DL (ref 8.6–10.4)
CHLORIDE BLD-SCNC: 93 MMOL/L (ref 98–107)
CHOLESTEROL/HDL RATIO: 4.4
CHOLESTEROL: 181 MG/DL
CO2: 24 MMOL/L (ref 20–31)
CREAT SERPL-MCNC: 0.64 MG/DL (ref 0.5–0.9)
CULTURE: NORMAL
DIFFERENTIAL TYPE: ABNORMAL
EOSINOPHILS RELATIVE PERCENT: 1 %
FIO2: 30
GFR AFRICAN AMERICAN: >60 ML/MIN
GFR NON-AFRICAN AMERICAN: >60 ML/MIN
GFR SERPL CREATININE-BSD FRML MDRD: ABNORMAL ML/MIN/{1.73_M2}
GFR SERPL CREATININE-BSD FRML MDRD: ABNORMAL ML/MIN/{1.73_M2}
GLUCOSE BLD-MCNC: 138 MG/DL (ref 65–105)
GLUCOSE BLD-MCNC: 144 MG/DL (ref 65–105)
GLUCOSE BLD-MCNC: 145 MG/DL (ref 65–105)
GLUCOSE BLD-MCNC: 163 MG/DL (ref 70–99)
GLUCOSE BLD-MCNC: 188 MG/DL (ref 65–105)
HCT VFR BLD CALC: 30.9 % (ref 36–46)
HDLC SERPL-MCNC: 41 MG/DL
HEMOGLOBIN: 10.4 G/DL (ref 12–16)
LDL CHOLESTEROL: 97 MG/DL (ref 0–130)
LYMPHOCYTES # BLD: 16 %
Lab: NORMAL
Lab: NORMAL
MAGNESIUM: 1.9 MG/DL (ref 1.6–2.6)
MCH RBC QN AUTO: 31.6 PG (ref 26–34)
MCHC RBC AUTO-ENTMCNC: 33.5 G/DL (ref 31–37)
MCV RBC AUTO: 94.4 FL (ref 80–100)
MODE: ABNORMAL
MONOCYTES # BLD: 6 %
MORPHOLOGY: ABNORMAL
NEGATIVE BASE EXCESS, ART: ABNORMAL (ref 0–2)
O2 DEVICE/FLOW/%: ABNORMAL
PATIENT TEMP: 38
PDW BLD-RTO: 16.6 % (ref 12.5–15.4)
PHENYTOIN DATE LAST DOSE: NORMAL
PHENYTOIN DOSE AMOUNT: NORMAL
PHENYTOIN DOSE TIME: NORMAL
PHENYTOIN FREE: 2.6 UG/ML (ref 1–2)
PHENYTOIN LEVEL: 15.1 UG/ML (ref 10–20)
PHOSPHORUS: 4.1 MG/DL (ref 2.6–4.5)
PLATELET # BLD: 384 K/UL (ref 140–450)
PLATELET ESTIMATE: ABNORMAL
PMV BLD AUTO: 8.4 FL (ref 6–12)
POC HCO3: 30.7 MMOL/L (ref 21–28)
POC O2 SATURATION: 95 % (ref 94–98)
POC PCO2 TEMP: 41 MM HG
POC PCO2: 39.3 MM HG (ref 35–48)
POC PH TEMP: 7.49
POC PH: 7.5 (ref 7.35–7.45)
POC PO2 TEMP: 76 MM HG
POC PO2: 71.1 MM HG (ref 83–108)
POSITIVE BASE EXCESS, ART: 7 (ref 0–3)
POTASSIUM SERPL-SCNC: 4.7 MMOL/L (ref 3.7–5.3)
RBC # BLD: 3.28 M/UL (ref 4–5.2)
RBC # BLD: ABNORMAL 10*6/UL
SAMPLE SITE: ABNORMAL
SEG NEUTROPHILS: 77 %
SEGMENTED NEUTROPHILS ABSOLUTE COUNT: 10.85 K/UL (ref 1.8–7.7)
SODIUM BLD-SCNC: 132 MMOL/L (ref 135–144)
SPECIMEN DESCRIPTION: NORMAL
SPECIMEN DESCRIPTION: NORMAL
STATUS: NORMAL
STATUS: NORMAL
TCO2 (CALC), ART: 32 MMOL/L (ref 22–29)
TRIGL SERPL-MCNC: 217 MG/DL
TRIGL SERPL-MCNC: 315 MG/DL
VLDLC SERPL CALC-MCNC: ABNORMAL MG/DL (ref 1–30)
WBC # BLD: 14.1 K/UL (ref 3.5–11)
WBC # BLD: ABNORMAL 10*3/UL

## 2017-08-27 PROCEDURE — 6370000000 HC RX 637 (ALT 250 FOR IP): Performed by: EMERGENCY MEDICINE

## 2017-08-27 PROCEDURE — 6360000002 HC RX W HCPCS: Performed by: EMERGENCY MEDICINE

## 2017-08-27 PROCEDURE — 94003 VENT MGMT INPAT SUBQ DAY: CPT

## 2017-08-27 PROCEDURE — S0028 INJECTION, FAMOTIDINE, 20 MG: HCPCS | Performed by: STUDENT IN AN ORGANIZED HEALTH CARE EDUCATION/TRAINING PROGRAM

## 2017-08-27 PROCEDURE — 94762 N-INVAS EAR/PLS OXIMTRY CONT: CPT

## 2017-08-27 PROCEDURE — 6370000000 HC RX 637 (ALT 250 FOR IP): Performed by: INTERNAL MEDICINE

## 2017-08-27 PROCEDURE — 2580000003 HC RX 258: Performed by: EMERGENCY MEDICINE

## 2017-08-27 PROCEDURE — 80061 LIPID PANEL: CPT

## 2017-08-27 PROCEDURE — 6370000000 HC RX 637 (ALT 250 FOR IP): Performed by: NURSE PRACTITIONER

## 2017-08-27 PROCEDURE — 36415 COLL VENOUS BLD VENIPUNCTURE: CPT

## 2017-08-27 PROCEDURE — 6370000000 HC RX 637 (ALT 250 FOR IP): Performed by: STUDENT IN AN ORGANIZED HEALTH CARE EDUCATION/TRAINING PROGRAM

## 2017-08-27 PROCEDURE — 2500000003 HC RX 250 WO HCPCS: Performed by: NEUROLOGICAL SURGERY

## 2017-08-27 PROCEDURE — 84100 ASSAY OF PHOSPHORUS: CPT

## 2017-08-27 PROCEDURE — 82947 ASSAY GLUCOSE BLOOD QUANT: CPT

## 2017-08-27 PROCEDURE — 2580000003 HC RX 258: Performed by: NEUROLOGICAL SURGERY

## 2017-08-27 PROCEDURE — 94770 HC ETCO2 MONITOR DAILY: CPT

## 2017-08-27 PROCEDURE — 36600 WITHDRAWAL OF ARTERIAL BLOOD: CPT

## 2017-08-27 PROCEDURE — 83735 ASSAY OF MAGNESIUM: CPT

## 2017-08-27 PROCEDURE — 80185 ASSAY OF PHENYTOIN TOTAL: CPT

## 2017-08-27 PROCEDURE — 99233 SBSQ HOSP IP/OBS HIGH 50: CPT | Performed by: PSYCHIATRY & NEUROLOGY

## 2017-08-27 PROCEDURE — 84478 ASSAY OF TRIGLYCERIDES: CPT

## 2017-08-27 PROCEDURE — 80048 BASIC METABOLIC PNL TOTAL CA: CPT

## 2017-08-27 PROCEDURE — 6360000002 HC RX W HCPCS: Performed by: NEUROLOGICAL SURGERY

## 2017-08-27 PROCEDURE — 6360000002 HC RX W HCPCS: Performed by: NURSE PRACTITIONER

## 2017-08-27 PROCEDURE — 99291 CRITICAL CARE FIRST HOUR: CPT | Performed by: INTERNAL MEDICINE

## 2017-08-27 PROCEDURE — 6360000002 HC RX W HCPCS: Performed by: PSYCHIATRY & NEUROLOGY

## 2017-08-27 PROCEDURE — 99291 CRITICAL CARE FIRST HOUR: CPT | Performed by: NEUROLOGICAL SURGERY

## 2017-08-27 PROCEDURE — 80186 ASSAY OF PHENYTOIN FREE: CPT

## 2017-08-27 PROCEDURE — 82803 BLOOD GASES ANY COMBINATION: CPT

## 2017-08-27 PROCEDURE — 71010 XR CHEST PORTABLE: CPT

## 2017-08-27 PROCEDURE — 2500000003 HC RX 250 WO HCPCS: Performed by: STUDENT IN AN ORGANIZED HEALTH CARE EDUCATION/TRAINING PROGRAM

## 2017-08-27 PROCEDURE — 85025 COMPLETE CBC W/AUTO DIFF WBC: CPT

## 2017-08-27 PROCEDURE — 2000000003 HC NEURO ICU R&B

## 2017-08-27 PROCEDURE — 6370000000 HC RX 637 (ALT 250 FOR IP): Performed by: NEUROLOGICAL SURGERY

## 2017-08-27 RX ORDER — ATORVASTATIN CALCIUM 40 MG/1
40 TABLET, FILM COATED ORAL NIGHTLY
Status: DISCONTINUED | OUTPATIENT
Start: 2017-08-27 | End: 2017-09-11 | Stop reason: HOSPADM

## 2017-08-27 RX ORDER — SODIUM CHLORIDE 1000 MG
1 TABLET, SOLUBLE MISCELLANEOUS
Status: DISCONTINUED | OUTPATIENT
Start: 2017-08-27 | End: 2017-09-07

## 2017-08-27 RX ADMIN — PHENYTOIN SODIUM 75 MG: 50 INJECTION INTRAMUSCULAR; INTRAVENOUS at 01:14

## 2017-08-27 RX ADMIN — MAGNESIUM GLUCONATE 500 MG ORAL TABLET 400 MG: 500 TABLET ORAL at 09:19

## 2017-08-27 RX ADMIN — PHENYTOIN SODIUM 75 MG: 50 INJECTION INTRAMUSCULAR; INTRAVENOUS at 09:19

## 2017-08-27 RX ADMIN — ACETAMINOPHEN 650 MG: 325 TABLET ORAL at 01:00

## 2017-08-27 RX ADMIN — FAMOTIDINE 20 MG: 10 INJECTION, SOLUTION INTRAVENOUS at 22:03

## 2017-08-27 RX ADMIN — INSULIN LISPRO 1 UNITS: 100 INJECTION, SOLUTION INTRAVENOUS; SUBCUTANEOUS at 12:34

## 2017-08-27 RX ADMIN — AMOXICILLIN AND CLAVULANATE POTASSIUM 1 TABLET: 875; 125 TABLET, FILM COATED ORAL at 22:01

## 2017-08-27 RX ADMIN — LEVETIRACETAM 1500 MG: 500 INJECTION, SOLUTION, CONCENTRATE INTRAVENOUS at 09:19

## 2017-08-27 RX ADMIN — SPIRONOLACTONE 50 MG: 25 TABLET ORAL at 09:18

## 2017-08-27 RX ADMIN — FLUTICASONE PROPIONATE 1 SPRAY: 50 SPRAY, METERED NASAL at 09:18

## 2017-08-27 RX ADMIN — SALINE NASAL SPRAY 2 DROP: 1.5 SOLUTION NASAL at 19:41

## 2017-08-27 RX ADMIN — DOCUSATE SODIUM -SENNOSIDES 2 TABLET: 50; 8.6 TABLET, COATED ORAL at 09:17

## 2017-08-27 RX ADMIN — ACETAMINOPHEN 650 MG: 325 TABLET ORAL at 09:19

## 2017-08-27 RX ADMIN — AMOXICILLIN AND CLAVULANATE POTASSIUM 1 TABLET: 875; 125 TABLET, FILM COATED ORAL at 09:19

## 2017-08-27 RX ADMIN — PHENYTOIN SODIUM 75 MG: 50 INJECTION INTRAMUSCULAR; INTRAVENOUS at 17:48

## 2017-08-27 RX ADMIN — SALINE NASAL SPRAY 2 DROP: 1.5 SOLUTION NASAL at 13:00

## 2017-08-27 RX ADMIN — SPIRONOLACTONE 50 MG: 25 TABLET ORAL at 22:01

## 2017-08-27 RX ADMIN — Medication 10 ML: at 09:35

## 2017-08-27 RX ADMIN — SODIUM CHLORIDE TAB 1 GM 1 G: 1 TAB at 12:34

## 2017-08-27 RX ADMIN — HEPARIN SODIUM 5000 UNITS: 5000 INJECTION, SOLUTION INTRAVENOUS; SUBCUTANEOUS at 22:04

## 2017-08-27 RX ADMIN — SODIUM CHLORIDE TAB 1 GM 1 G: 1 TAB at 09:18

## 2017-08-27 RX ADMIN — FENTANYL CITRATE 50 MCG: 50 INJECTION INTRAMUSCULAR; INTRAVENOUS at 09:18

## 2017-08-27 RX ADMIN — CARVEDILOL 3.12 MG: 3.12 TABLET, FILM COATED ORAL at 09:19

## 2017-08-27 RX ADMIN — ACETAMINOPHEN 650 MG: 325 TABLET ORAL at 12:56

## 2017-08-27 RX ADMIN — HEPARIN SODIUM 5000 UNITS: 5000 INJECTION, SOLUTION INTRAVENOUS; SUBCUTANEOUS at 13:02

## 2017-08-27 RX ADMIN — PROPOFOL 20 MCG/KG/MIN: 10 INJECTION, EMULSION INTRAVENOUS at 01:14

## 2017-08-27 RX ADMIN — DEXMEDETOMIDINE HYDROCHLORIDE 0.4 MCG/KG/HR: 100 INJECTION, SOLUTION INTRAVENOUS at 12:35

## 2017-08-27 RX ADMIN — FENTANYL CITRATE 50 MCG: 50 INJECTION INTRAMUSCULAR; INTRAVENOUS at 22:10

## 2017-08-27 RX ADMIN — CARVEDILOL 3.12 MG: 3.12 TABLET, FILM COATED ORAL at 17:48

## 2017-08-27 RX ADMIN — ALLOPURINOL 300 MG: 300 TABLET ORAL at 09:19

## 2017-08-27 RX ADMIN — POTASSIUM CHLORIDE 40 MEQ: 40 SOLUTION ORAL at 09:18

## 2017-08-27 RX ADMIN — SODIUM CHLORIDE TAB 1 GM 1 G: 1 TAB at 17:48

## 2017-08-27 RX ADMIN — FAMOTIDINE 20 MG: 10 INJECTION, SOLUTION INTRAVENOUS at 09:19

## 2017-08-27 RX ADMIN — LEVETIRACETAM 1500 MG: 500 INJECTION, SOLUTION, CONCENTRATE INTRAVENOUS at 20:00

## 2017-08-27 RX ADMIN — INSULIN LISPRO 1 UNITS: 100 INJECTION, SOLUTION INTRAVENOUS; SUBCUTANEOUS at 06:37

## 2017-08-27 RX ADMIN — HEPARIN SODIUM 5000 UNITS: 5000 INJECTION, SOLUTION INTRAVENOUS; SUBCUTANEOUS at 06:36

## 2017-08-27 RX ADMIN — SALINE NASAL SPRAY 2 DROP: 1.5 SOLUTION NASAL at 09:18

## 2017-08-27 RX ADMIN — FENTANYL CITRATE 50 MCG: 50 INJECTION INTRAMUSCULAR; INTRAVENOUS at 17:48

## 2017-08-27 RX ADMIN — INSULIN LISPRO 1 UNITS: 100 INJECTION, SOLUTION INTRAVENOUS; SUBCUTANEOUS at 17:48

## 2017-08-27 ASSESSMENT — PULMONARY FUNCTION TESTS
PIF_VALUE: 12
PIF_VALUE: 18
PIF_VALUE: 20
PIF_VALUE: 23
PIF_VALUE: 29
PIF_VALUE: 20
PIF_VALUE: 23
PIF_VALUE: 24
PIF_VALUE: 20
PIF_VALUE: 22
PIF_VALUE: 5
PIF_VALUE: 25
PIF_VALUE: 21
PIF_VALUE: 18
PIF_VALUE: 15
PIF_VALUE: 12
PIF_VALUE: 19
PIF_VALUE: 12

## 2017-08-27 ASSESSMENT — PAIN SCALES - GENERAL
PAINLEVEL_OUTOF10: 8
PAINLEVEL_OUTOF10: 7
PAINLEVEL_OUTOF10: 8

## 2017-08-28 ENCOUNTER — ANESTHESIA EVENT (OUTPATIENT)
Dept: SURGICAL ICU | Age: 77
DRG: 025 | End: 2017-08-28
Payer: MEDICARE

## 2017-08-28 ENCOUNTER — APPOINTMENT (OUTPATIENT)
Dept: GENERAL RADIOLOGY | Age: 77
DRG: 025 | End: 2017-08-28
Payer: MEDICARE

## 2017-08-28 ENCOUNTER — ANESTHESIA (OUTPATIENT)
Dept: SURGICAL ICU | Age: 77
DRG: 025 | End: 2017-08-28
Payer: MEDICARE

## 2017-08-28 LAB
ABSOLUTE EOS #: 0.4 K/UL (ref 0–0.4)
ABSOLUTE LYMPH #: 1.7 K/UL (ref 1–4.8)
ABSOLUTE MONO #: 1.4 K/UL (ref 0.1–1.2)
ALLEN TEST: POSITIVE
ANION GAP SERPL CALCULATED.3IONS-SCNC: 16 MMOL/L (ref 9–17)
BASOPHILS # BLD: 0 %
BASOPHILS ABSOLUTE: 0 K/UL (ref 0–0.2)
BUN BLDV-MCNC: 14 MG/DL (ref 8–23)
BUN/CREAT BLD: ABNORMAL (ref 9–20)
CALCIUM SERPL-MCNC: 8.9 MG/DL (ref 8.6–10.4)
CHLORIDE BLD-SCNC: 97 MMOL/L (ref 98–107)
CO2: 25 MMOL/L (ref 20–31)
CREAT SERPL-MCNC: 0.63 MG/DL (ref 0.5–0.9)
DIFFERENTIAL TYPE: ABNORMAL
EOSINOPHILS RELATIVE PERCENT: 3 %
FIO2: 40
GFR AFRICAN AMERICAN: >60 ML/MIN
GFR NON-AFRICAN AMERICAN: >60 ML/MIN
GFR SERPL CREATININE-BSD FRML MDRD: ABNORMAL ML/MIN/{1.73_M2}
GFR SERPL CREATININE-BSD FRML MDRD: ABNORMAL ML/MIN/{1.73_M2}
GLUCOSE BLD-MCNC: 124 MG/DL (ref 65–105)
GLUCOSE BLD-MCNC: 131 MG/DL (ref 65–105)
GLUCOSE BLD-MCNC: 141 MG/DL (ref 70–99)
GLUCOSE BLD-MCNC: 150 MG/DL (ref 65–105)
GLUCOSE BLD-MCNC: 157 MG/DL (ref 65–105)
HCT VFR BLD CALC: 26.3 % (ref 36–46)
HEMOGLOBIN: 9.1 G/DL (ref 12–16)
LYMPHOCYTES # BLD: 12 %
MAGNESIUM: 2 MG/DL (ref 1.6–2.6)
MCH RBC QN AUTO: 32.5 PG (ref 26–34)
MCHC RBC AUTO-ENTMCNC: 34.4 G/DL (ref 31–37)
MCV RBC AUTO: 94.5 FL (ref 80–100)
MODE: ABNORMAL
MONOCYTES # BLD: 10 %
NEGATIVE BASE EXCESS, ART: ABNORMAL (ref 0–2)
O2 DEVICE/FLOW/%: ABNORMAL
PATIENT TEMP: ABNORMAL
PDW BLD-RTO: 16.2 % (ref 12.5–15.4)
PHENYTOIN DATE LAST DOSE: NORMAL
PHENYTOIN DOSE AMOUNT: NORMAL
PHENYTOIN DOSE TIME: NORMAL
PHENYTOIN FREE: 2.2 UG/ML (ref 1–2)
PHENYTOIN LEVEL: 14.1 UG/ML (ref 10–20)
PHOSPHORUS: 4.6 MG/DL (ref 2.6–4.5)
PLATELET # BLD: 487 K/UL (ref 140–450)
PLATELET ESTIMATE: ABNORMAL
PMV BLD AUTO: 8.1 FL (ref 6–12)
POC HCO3: 30.2 MMOL/L (ref 21–28)
POC O2 SATURATION: 98 % (ref 94–98)
POC PCO2 TEMP: ABNORMAL MM HG
POC PCO2: 44.4 MM HG (ref 35–48)
POC PH TEMP: ABNORMAL
POC PH: 7.44 (ref 7.35–7.45)
POC PO2 TEMP: ABNORMAL MM HG
POC PO2: 102.8 MM HG (ref 83–108)
POSITIVE BASE EXCESS, ART: 6 (ref 0–3)
POTASSIUM SERPL-SCNC: 4.2 MMOL/L (ref 3.7–5.3)
RBC # BLD: 2.78 M/UL (ref 4–5.2)
RBC # BLD: ABNORMAL 10*6/UL
SAMPLE SITE: ABNORMAL
SEG NEUTROPHILS: 75 %
SEGMENTED NEUTROPHILS ABSOLUTE COUNT: 10.5 K/UL (ref 1.8–7.7)
SODIUM BLD-SCNC: 138 MMOL/L (ref 135–144)
TCO2 (CALC), ART: 32 MMOL/L (ref 22–29)
WBC # BLD: 14 K/UL (ref 3.5–11)
WBC # BLD: ABNORMAL 10*3/UL

## 2017-08-28 PROCEDURE — 99232 SBSQ HOSP IP/OBS MODERATE 35: CPT | Performed by: PSYCHIATRY & NEUROLOGY

## 2017-08-28 PROCEDURE — 36415 COLL VENOUS BLD VENIPUNCTURE: CPT

## 2017-08-28 PROCEDURE — S0028 INJECTION, FAMOTIDINE, 20 MG: HCPCS | Performed by: STUDENT IN AN ORGANIZED HEALTH CARE EDUCATION/TRAINING PROGRAM

## 2017-08-28 PROCEDURE — 2500000003 HC RX 250 WO HCPCS: Performed by: STUDENT IN AN ORGANIZED HEALTH CARE EDUCATION/TRAINING PROGRAM

## 2017-08-28 PROCEDURE — 82947 ASSAY GLUCOSE BLOOD QUANT: CPT

## 2017-08-28 PROCEDURE — 85025 COMPLETE CBC W/AUTO DIFF WBC: CPT

## 2017-08-28 PROCEDURE — 6370000000 HC RX 637 (ALT 250 FOR IP): Performed by: STUDENT IN AN ORGANIZED HEALTH CARE EDUCATION/TRAINING PROGRAM

## 2017-08-28 PROCEDURE — 31720 CLEARANCE OF AIRWAYS: CPT

## 2017-08-28 PROCEDURE — 82803 BLOOD GASES ANY COMBINATION: CPT

## 2017-08-28 PROCEDURE — 97110 THERAPEUTIC EXERCISES: CPT

## 2017-08-28 PROCEDURE — 94762 N-INVAS EAR/PLS OXIMTRY CONT: CPT

## 2017-08-28 PROCEDURE — 83735 ASSAY OF MAGNESIUM: CPT

## 2017-08-28 PROCEDURE — 94003 VENT MGMT INPAT SUBQ DAY: CPT

## 2017-08-28 PROCEDURE — 6360000002 HC RX W HCPCS: Performed by: EMERGENCY MEDICINE

## 2017-08-28 PROCEDURE — 94770 HC ETCO2 MONITOR DAILY: CPT

## 2017-08-28 PROCEDURE — 2580000003 HC RX 258: Performed by: EMERGENCY MEDICINE

## 2017-08-28 PROCEDURE — 6370000000 HC RX 637 (ALT 250 FOR IP): Performed by: INTERNAL MEDICINE

## 2017-08-28 PROCEDURE — 6360000002 HC RX W HCPCS: Performed by: NURSE PRACTITIONER

## 2017-08-28 PROCEDURE — 6370000000 HC RX 637 (ALT 250 FOR IP): Performed by: EMERGENCY MEDICINE

## 2017-08-28 PROCEDURE — 6360000002 HC RX W HCPCS: Performed by: PSYCHIATRY & NEUROLOGY

## 2017-08-28 PROCEDURE — 80048 BASIC METABOLIC PNL TOTAL CA: CPT

## 2017-08-28 PROCEDURE — 99211 OFF/OP EST MAY X REQ PHY/QHP: CPT

## 2017-08-28 PROCEDURE — 80185 ASSAY OF PHENYTOIN TOTAL: CPT

## 2017-08-28 PROCEDURE — 99291 CRITICAL CARE FIRST HOUR: CPT | Performed by: INTERNAL MEDICINE

## 2017-08-28 PROCEDURE — 2580000003 HC RX 258: Performed by: NEUROLOGICAL SURGERY

## 2017-08-28 PROCEDURE — 80186 ASSAY OF PHENYTOIN FREE: CPT

## 2017-08-28 PROCEDURE — 6370000000 HC RX 637 (ALT 250 FOR IP): Performed by: NURSE PRACTITIONER

## 2017-08-28 PROCEDURE — 6370000000 HC RX 637 (ALT 250 FOR IP): Performed by: NEUROLOGICAL SURGERY

## 2017-08-28 PROCEDURE — 94640 AIRWAY INHALATION TREATMENT: CPT

## 2017-08-28 PROCEDURE — 2000000003 HC NEURO ICU R&B

## 2017-08-28 PROCEDURE — 36600 WITHDRAWAL OF ARTERIAL BLOOD: CPT

## 2017-08-28 PROCEDURE — 6360000002 HC RX W HCPCS: Performed by: INTERNAL MEDICINE

## 2017-08-28 PROCEDURE — 84100 ASSAY OF PHOSPHORUS: CPT

## 2017-08-28 PROCEDURE — 71010 XR CHEST PORTABLE: CPT

## 2017-08-28 RX ORDER — PHENYTOIN SODIUM 50 MG/ML
75 INJECTION, SOLUTION INTRAMUSCULAR; INTRAVENOUS EVERY 12 HOURS
Status: DISCONTINUED | OUTPATIENT
Start: 2017-08-28 | End: 2017-08-29

## 2017-08-28 RX ORDER — ALBUTEROL SULFATE 2.5 MG/3ML
2.5 SOLUTION RESPIRATORY (INHALATION) 3 TIMES DAILY
Status: DISCONTINUED | OUTPATIENT
Start: 2017-08-28 | End: 2017-08-31

## 2017-08-28 RX ADMIN — SALINE NASAL SPRAY 2 DROP: 1.5 SOLUTION NASAL at 08:00

## 2017-08-28 RX ADMIN — ALBUTEROL SULFATE 2.5 MG: 2.5 SOLUTION RESPIRATORY (INHALATION) at 15:26

## 2017-08-28 RX ADMIN — DOCUSATE SODIUM -SENNOSIDES 2 TABLET: 50; 8.6 TABLET, COATED ORAL at 08:38

## 2017-08-28 RX ADMIN — LEVETIRACETAM 1500 MG: 500 INJECTION, SOLUTION, CONCENTRATE INTRAVENOUS at 21:52

## 2017-08-28 RX ADMIN — HEPARIN SODIUM 5000 UNITS: 5000 INJECTION, SOLUTION INTRAVENOUS; SUBCUTANEOUS at 15:05

## 2017-08-28 RX ADMIN — ACETAMINOPHEN 650 MG: 325 TABLET ORAL at 00:55

## 2017-08-28 RX ADMIN — PHENYTOIN SODIUM 75 MG: 50 INJECTION INTRAMUSCULAR; INTRAVENOUS at 00:50

## 2017-08-28 RX ADMIN — ATORVASTATIN CALCIUM 40 MG: 40 TABLET, FILM COATED ORAL at 00:42

## 2017-08-28 RX ADMIN — FAMOTIDINE 20 MG: 10 INJECTION, SOLUTION INTRAVENOUS at 08:38

## 2017-08-28 RX ADMIN — HEPARIN SODIUM 5000 UNITS: 5000 INJECTION, SOLUTION INTRAVENOUS; SUBCUTANEOUS at 06:20

## 2017-08-28 RX ADMIN — Medication 10 ML: at 11:51

## 2017-08-28 RX ADMIN — AMOXICILLIN AND CLAVULANATE POTASSIUM 1 TABLET: 875; 125 TABLET, FILM COATED ORAL at 08:38

## 2017-08-28 RX ADMIN — SALINE NASAL SPRAY 2 DROP: 1.5 SOLUTION NASAL at 21:53

## 2017-08-28 RX ADMIN — FAMOTIDINE 20 MG: 10 INJECTION, SOLUTION INTRAVENOUS at 21:52

## 2017-08-28 RX ADMIN — SPIRONOLACTONE 50 MG: 25 TABLET ORAL at 08:37

## 2017-08-28 RX ADMIN — POTASSIUM CHLORIDE 40 MEQ: 40 SOLUTION ORAL at 08:37

## 2017-08-28 RX ADMIN — FLUTICASONE PROPIONATE 1 SPRAY: 50 SPRAY, METERED NASAL at 09:07

## 2017-08-28 RX ADMIN — SALINE NASAL SPRAY 2 DROP: 1.5 SOLUTION NASAL at 16:31

## 2017-08-28 RX ADMIN — INSULIN LISPRO 1 UNITS: 100 INJECTION, SOLUTION INTRAVENOUS; SUBCUTANEOUS at 00:43

## 2017-08-28 RX ADMIN — SALINE NASAL SPRAY 2 DROP: 1.5 SOLUTION NASAL at 12:02

## 2017-08-28 RX ADMIN — HEPARIN SODIUM 5000 UNITS: 5000 INJECTION, SOLUTION INTRAVENOUS; SUBCUTANEOUS at 21:54

## 2017-08-28 RX ADMIN — ALBUTEROL SULFATE 2.5 MG: 2.5 SOLUTION RESPIRATORY (INHALATION) at 21:00

## 2017-08-28 RX ADMIN — PHENYTOIN SODIUM 75 MG: 50 INJECTION INTRAMUSCULAR; INTRAVENOUS at 21:52

## 2017-08-28 RX ADMIN — LEVETIRACETAM 1500 MG: 500 INJECTION, SOLUTION, CONCENTRATE INTRAVENOUS at 09:07

## 2017-08-28 RX ADMIN — CARVEDILOL 3.12 MG: 3.12 TABLET, FILM COATED ORAL at 08:38

## 2017-08-28 RX ADMIN — POLYETHYLENE GLYCOL 3350 17 G: 17 POWDER, FOR SOLUTION ORAL at 08:38

## 2017-08-28 RX ADMIN — PHENYTOIN SODIUM 75 MG: 50 INJECTION INTRAMUSCULAR; INTRAVENOUS at 08:38

## 2017-08-28 RX ADMIN — ALLOPURINOL 300 MG: 300 TABLET ORAL at 08:38

## 2017-08-28 RX ADMIN — SODIUM CHLORIDE TAB 1 GM 1 G: 1 TAB at 08:38

## 2017-08-28 RX ADMIN — MAGNESIUM GLUCONATE 500 MG ORAL TABLET 400 MG: 500 TABLET ORAL at 08:38

## 2017-08-28 ASSESSMENT — PULMONARY FUNCTION TESTS
PIF_VALUE: 22
PIF_VALUE: 12
PIF_VALUE: 20
PIF_VALUE: 12

## 2017-08-29 ENCOUNTER — APPOINTMENT (OUTPATIENT)
Dept: GENERAL RADIOLOGY | Age: 77
DRG: 025 | End: 2017-08-29
Payer: MEDICARE

## 2017-08-29 LAB
ABSOLUTE EOS #: 0.3 K/UL (ref 0–0.4)
ABSOLUTE LYMPH #: 1.5 K/UL (ref 1–4.8)
ABSOLUTE MONO #: 1.4 K/UL (ref 0.1–1.2)
ANION GAP SERPL CALCULATED.3IONS-SCNC: 13 MMOL/L (ref 9–17)
BASOPHILS # BLD: 0 %
BASOPHILS ABSOLUTE: 0 K/UL (ref 0–0.2)
BUN BLDV-MCNC: 10 MG/DL (ref 8–23)
BUN/CREAT BLD: ABNORMAL (ref 9–20)
CALCIUM SERPL-MCNC: 9.1 MG/DL (ref 8.6–10.4)
CHLORIDE BLD-SCNC: 94 MMOL/L (ref 98–107)
CO2: 26 MMOL/L (ref 20–31)
CREAT SERPL-MCNC: 0.5 MG/DL (ref 0.5–0.9)
DIFFERENTIAL TYPE: ABNORMAL
EOSINOPHILS RELATIVE PERCENT: 3 %
GFR AFRICAN AMERICAN: >60 ML/MIN
GFR NON-AFRICAN AMERICAN: >60 ML/MIN
GFR SERPL CREATININE-BSD FRML MDRD: ABNORMAL ML/MIN/{1.73_M2}
GFR SERPL CREATININE-BSD FRML MDRD: ABNORMAL ML/MIN/{1.73_M2}
GLUCOSE BLD-MCNC: 110 MG/DL (ref 65–105)
GLUCOSE BLD-MCNC: 115 MG/DL (ref 65–105)
GLUCOSE BLD-MCNC: 117 MG/DL (ref 70–99)
GLUCOSE BLD-MCNC: 144 MG/DL (ref 65–105)
GLUCOSE BLD-MCNC: 98 MG/DL (ref 65–105)
HCT VFR BLD CALC: 26.7 % (ref 36–46)
HEMOGLOBIN: 9 G/DL (ref 12–16)
LYMPHOCYTES # BLD: 12 %
MAGNESIUM: 1.7 MG/DL (ref 1.6–2.6)
MCH RBC QN AUTO: 32.2 PG (ref 26–34)
MCHC RBC AUTO-ENTMCNC: 33.7 G/DL (ref 31–37)
MCV RBC AUTO: 95.6 FL (ref 80–100)
MONOCYTES # BLD: 11 %
PDW BLD-RTO: 16.8 % (ref 12.5–15.4)
PHENYTOIN DATE LAST DOSE: NORMAL
PHENYTOIN DOSE AMOUNT: NORMAL
PHENYTOIN DOSE TIME: NORMAL
PHENYTOIN FREE: 2.5 UG/ML (ref 1–2)
PHENYTOIN LEVEL: 12.3 UG/ML (ref 10–20)
PHOSPHORUS: 4.3 MG/DL (ref 2.6–4.5)
PLATELET # BLD: 604 K/UL (ref 140–450)
PLATELET ESTIMATE: ABNORMAL
PMV BLD AUTO: 7.7 FL (ref 6–12)
POTASSIUM SERPL-SCNC: 3.8 MMOL/L (ref 3.7–5.3)
RBC # BLD: 2.79 M/UL (ref 4–5.2)
RBC # BLD: ABNORMAL 10*6/UL
SEG NEUTROPHILS: 74 %
SEGMENTED NEUTROPHILS ABSOLUTE COUNT: 9.3 K/UL (ref 1.8–7.7)
SODIUM BLD-SCNC: 133 MMOL/L (ref 135–144)
WBC # BLD: 12.5 K/UL (ref 3.5–11)
WBC # BLD: ABNORMAL 10*3/UL

## 2017-08-29 PROCEDURE — 6370000000 HC RX 637 (ALT 250 FOR IP): Performed by: NURSE PRACTITIONER

## 2017-08-29 PROCEDURE — 6370000000 HC RX 637 (ALT 250 FOR IP): Performed by: EMERGENCY MEDICINE

## 2017-08-29 PROCEDURE — 92611 MOTION FLUOROSCOPY/SWALLOW: CPT

## 2017-08-29 PROCEDURE — 85025 COMPLETE CBC W/AUTO DIFF WBC: CPT

## 2017-08-29 PROCEDURE — 2500000003 HC RX 250 WO HCPCS: Performed by: STUDENT IN AN ORGANIZED HEALTH CARE EDUCATION/TRAINING PROGRAM

## 2017-08-29 PROCEDURE — 84100 ASSAY OF PHOSPHORUS: CPT

## 2017-08-29 PROCEDURE — G8979 MOBILITY GOAL STATUS: HCPCS

## 2017-08-29 PROCEDURE — S0028 INJECTION, FAMOTIDINE, 20 MG: HCPCS | Performed by: STUDENT IN AN ORGANIZED HEALTH CARE EDUCATION/TRAINING PROGRAM

## 2017-08-29 PROCEDURE — 83735 ASSAY OF MAGNESIUM: CPT

## 2017-08-29 PROCEDURE — 97530 THERAPEUTIC ACTIVITIES: CPT

## 2017-08-29 PROCEDURE — 6360000002 HC RX W HCPCS: Performed by: EMERGENCY MEDICINE

## 2017-08-29 PROCEDURE — G8997 SWALLOW GOAL STATUS: HCPCS

## 2017-08-29 PROCEDURE — 74230 X-RAY XM SWLNG FUNCJ C+: CPT

## 2017-08-29 PROCEDURE — 80048 BASIC METABOLIC PNL TOTAL CA: CPT

## 2017-08-29 PROCEDURE — G9163 LANG EXPRESS GOAL STATUS: HCPCS

## 2017-08-29 PROCEDURE — 6360000002 HC RX W HCPCS: Performed by: INTERNAL MEDICINE

## 2017-08-29 PROCEDURE — 2000000003 HC NEURO ICU R&B

## 2017-08-29 PROCEDURE — 80185 ASSAY OF PHENYTOIN TOTAL: CPT

## 2017-08-29 PROCEDURE — G8978 MOBILITY CURRENT STATUS: HCPCS

## 2017-08-29 PROCEDURE — 97166 OT EVAL MOD COMPLEX 45 MIN: CPT

## 2017-08-29 PROCEDURE — 6360000002 HC RX W HCPCS: Performed by: PSYCHIATRY & NEUROLOGY

## 2017-08-29 PROCEDURE — 2580000003 HC RX 258: Performed by: EMERGENCY MEDICINE

## 2017-08-29 PROCEDURE — 36415 COLL VENOUS BLD VENIPUNCTURE: CPT

## 2017-08-29 PROCEDURE — G8996 SWALLOW CURRENT STATUS: HCPCS

## 2017-08-29 PROCEDURE — 6360000002 HC RX W HCPCS: Performed by: NURSE PRACTITIONER

## 2017-08-29 PROCEDURE — 94762 N-INVAS EAR/PLS OXIMTRY CONT: CPT

## 2017-08-29 PROCEDURE — 97164 PT RE-EVAL EST PLAN CARE: CPT

## 2017-08-29 PROCEDURE — G9162 LANG EXPRESS CURRENT STATUS: HCPCS

## 2017-08-29 PROCEDURE — 99291 CRITICAL CARE FIRST HOUR: CPT | Performed by: INTERNAL MEDICINE

## 2017-08-29 PROCEDURE — 99291 CRITICAL CARE FIRST HOUR: CPT | Performed by: NEUROLOGICAL SURGERY

## 2017-08-29 PROCEDURE — 80186 ASSAY OF PHENYTOIN FREE: CPT

## 2017-08-29 PROCEDURE — 92610 EVALUATE SWALLOWING FUNCTION: CPT

## 2017-08-29 PROCEDURE — 94640 AIRWAY INHALATION TREATMENT: CPT

## 2017-08-29 PROCEDURE — 92523 SPEECH SOUND LANG COMPREHEN: CPT

## 2017-08-29 PROCEDURE — G8987 SELF CARE CURRENT STATUS: HCPCS

## 2017-08-29 PROCEDURE — 99232 SBSQ HOSP IP/OBS MODERATE 35: CPT | Performed by: NURSE PRACTITIONER

## 2017-08-29 PROCEDURE — 2580000003 HC RX 258: Performed by: NEUROLOGICAL SURGERY

## 2017-08-29 PROCEDURE — 82947 ASSAY GLUCOSE BLOOD QUANT: CPT

## 2017-08-29 PROCEDURE — G8988 SELF CARE GOAL STATUS: HCPCS

## 2017-08-29 RX ORDER — PHENYTOIN SODIUM 50 MG/ML
75 INJECTION, SOLUTION INTRAMUSCULAR; INTRAVENOUS DAILY
Status: COMPLETED | OUTPATIENT
Start: 2017-08-30 | End: 2017-09-01

## 2017-08-29 RX ADMIN — LEVETIRACETAM 1500 MG: 500 INJECTION, SOLUTION, CONCENTRATE INTRAVENOUS at 10:06

## 2017-08-29 RX ADMIN — HEPARIN SODIUM 5000 UNITS: 5000 INJECTION, SOLUTION INTRAVENOUS; SUBCUTANEOUS at 21:45

## 2017-08-29 RX ADMIN — ALBUTEROL SULFATE 2.5 MG: 2.5 SOLUTION RESPIRATORY (INHALATION) at 13:33

## 2017-08-29 RX ADMIN — FLUTICASONE PROPIONATE 1 SPRAY: 50 SPRAY, METERED NASAL at 10:04

## 2017-08-29 RX ADMIN — SALINE NASAL SPRAY 2 DROP: 1.5 SOLUTION NASAL at 12:15

## 2017-08-29 RX ADMIN — SALINE NASAL SPRAY 2 DROP: 1.5 SOLUTION NASAL at 22:00

## 2017-08-29 RX ADMIN — LEVETIRACETAM 1500 MG: 500 INJECTION, SOLUTION, CONCENTRATE INTRAVENOUS at 21:46

## 2017-08-29 RX ADMIN — CARVEDILOL 3.12 MG: 3.12 TABLET, FILM COATED ORAL at 18:19

## 2017-08-29 RX ADMIN — ALBUTEROL SULFATE 2.5 MG: 2.5 SOLUTION RESPIRATORY (INHALATION) at 21:26

## 2017-08-29 RX ADMIN — SALINE NASAL SPRAY 2 DROP: 1.5 SOLUTION NASAL at 10:03

## 2017-08-29 RX ADMIN — SODIUM CHLORIDE TAB 1 GM 1 G: 1 TAB at 15:41

## 2017-08-29 RX ADMIN — ALBUTEROL SULFATE 2.5 MG: 2.5 SOLUTION RESPIRATORY (INHALATION) at 09:27

## 2017-08-29 RX ADMIN — FAMOTIDINE 20 MG: 10 INJECTION, SOLUTION INTRAVENOUS at 21:45

## 2017-08-29 RX ADMIN — SALINE NASAL SPRAY 2 DROP: 1.5 SOLUTION NASAL at 15:49

## 2017-08-29 RX ADMIN — PHENYTOIN SODIUM 75 MG: 50 INJECTION INTRAMUSCULAR; INTRAVENOUS at 10:05

## 2017-08-29 RX ADMIN — SPIRONOLACTONE 50 MG: 25 TABLET ORAL at 21:45

## 2017-08-29 RX ADMIN — Medication 10 ML: at 12:01

## 2017-08-29 RX ADMIN — ACETAMINOPHEN 650 MG: 325 TABLET ORAL at 21:45

## 2017-08-29 RX ADMIN — FAMOTIDINE 20 MG: 10 INJECTION, SOLUTION INTRAVENOUS at 10:05

## 2017-08-29 RX ADMIN — HEPARIN SODIUM 5000 UNITS: 5000 INJECTION, SOLUTION INTRAVENOUS; SUBCUTANEOUS at 10:06

## 2017-08-29 RX ADMIN — HEPARIN SODIUM 5000 UNITS: 5000 INJECTION, SOLUTION INTRAVENOUS; SUBCUTANEOUS at 15:41

## 2017-08-29 ASSESSMENT — ENCOUNTER SYMPTOMS
SPUTUM PRODUCTION: 0
SHORTNESS OF BREATH: 0
HEMOPTYSIS: 0
NAUSEA: 0
COUGH: 0
WHEEZING: 0

## 2017-08-29 ASSESSMENT — PAIN DESCRIPTION - LOCATION
LOCATION: LEG
LOCATION: LEG

## 2017-08-29 ASSESSMENT — PAIN SCALES - GENERAL
PAINLEVEL_OUTOF10: 9
PAINLEVEL_OUTOF10: 3
PAINLEVEL_OUTOF10: 8

## 2017-08-29 ASSESSMENT — PAIN DESCRIPTION - PAIN TYPE
TYPE: ACUTE PAIN
TYPE: ACUTE PAIN

## 2017-08-29 ASSESSMENT — PAIN DESCRIPTION - ORIENTATION
ORIENTATION: RIGHT;LEFT
ORIENTATION: RIGHT;LEFT

## 2017-08-30 LAB
ABSOLUTE EOS #: 0.3 K/UL (ref 0–0.4)
ABSOLUTE LYMPH #: 2.2 K/UL (ref 1–4.8)
ABSOLUTE MONO #: 1.3 K/UL (ref 0.1–1.2)
ANION GAP SERPL CALCULATED.3IONS-SCNC: 15 MMOL/L (ref 9–17)
BASOPHILS # BLD: 0 %
BASOPHILS ABSOLUTE: 0 K/UL (ref 0–0.2)
BUN BLDV-MCNC: 8 MG/DL (ref 8–23)
BUN/CREAT BLD: ABNORMAL (ref 9–20)
CALCIUM SERPL-MCNC: 9.2 MG/DL (ref 8.6–10.4)
CHLORIDE BLD-SCNC: 96 MMOL/L (ref 98–107)
CO2: 25 MMOL/L (ref 20–31)
CREAT SERPL-MCNC: 0.54 MG/DL (ref 0.5–0.9)
DIFFERENTIAL TYPE: ABNORMAL
EOSINOPHILS RELATIVE PERCENT: 2 %
GFR AFRICAN AMERICAN: >60 ML/MIN
GFR NON-AFRICAN AMERICAN: >60 ML/MIN
GFR SERPL CREATININE-BSD FRML MDRD: ABNORMAL ML/MIN/{1.73_M2}
GFR SERPL CREATININE-BSD FRML MDRD: ABNORMAL ML/MIN/{1.73_M2}
GLUCOSE BLD-MCNC: 101 MG/DL (ref 65–105)
GLUCOSE BLD-MCNC: 115 MG/DL (ref 65–105)
GLUCOSE BLD-MCNC: 120 MG/DL (ref 70–99)
GLUCOSE BLD-MCNC: 142 MG/DL (ref 65–105)
GLUCOSE BLD-MCNC: 145 MG/DL (ref 65–105)
GLUCOSE BLD-MCNC: 94 MG/DL (ref 65–105)
HCT VFR BLD CALC: 32.5 % (ref 36–46)
HEMOGLOBIN: 10.5 G/DL (ref 12–16)
LYMPHOCYTES # BLD: 18 %
MAGNESIUM: 1.6 MG/DL (ref 1.6–2.6)
MCH RBC QN AUTO: 31.3 PG (ref 26–34)
MCHC RBC AUTO-ENTMCNC: 32.3 G/DL (ref 31–37)
MCV RBC AUTO: 97.1 FL (ref 80–100)
MONOCYTES # BLD: 11 %
PDW BLD-RTO: 17.2 % (ref 12.5–15.4)
PHENYTOIN DATE LAST DOSE: NORMAL
PHENYTOIN DOSE AMOUNT: NORMAL
PHENYTOIN DOSE TIME: NORMAL
PHENYTOIN FREE: 1.7 UG/ML (ref 1–2)
PHENYTOIN LEVEL: 11.1 UG/ML (ref 10–20)
PHOSPHORUS: 3.9 MG/DL (ref 2.6–4.5)
PLATELET # BLD: 707 K/UL (ref 140–450)
PLATELET ESTIMATE: ABNORMAL
PMV BLD AUTO: 7.3 FL (ref 6–12)
POTASSIUM SERPL-SCNC: 3.4 MMOL/L (ref 3.7–5.3)
RBC # BLD: 3.35 M/UL (ref 4–5.2)
RBC # BLD: ABNORMAL 10*6/UL
SEG NEUTROPHILS: 69 %
SEGMENTED NEUTROPHILS ABSOLUTE COUNT: 8.7 K/UL (ref 1.8–7.7)
SODIUM BLD-SCNC: 136 MMOL/L (ref 135–144)
WBC # BLD: 12.6 K/UL (ref 3.5–11)
WBC # BLD: ABNORMAL 10*3/UL

## 2017-08-30 PROCEDURE — 6370000000 HC RX 637 (ALT 250 FOR IP): Performed by: NEUROLOGICAL SURGERY

## 2017-08-30 PROCEDURE — 6370000000 HC RX 637 (ALT 250 FOR IP): Performed by: NURSE PRACTITIONER

## 2017-08-30 PROCEDURE — 2060000000 HC ICU INTERMEDIATE R&B

## 2017-08-30 PROCEDURE — 6370000000 HC RX 637 (ALT 250 FOR IP): Performed by: EMERGENCY MEDICINE

## 2017-08-30 PROCEDURE — 99233 SBSQ HOSP IP/OBS HIGH 50: CPT | Performed by: INTERNAL MEDICINE

## 2017-08-30 PROCEDURE — 80186 ASSAY OF PHENYTOIN FREE: CPT

## 2017-08-30 PROCEDURE — 84100 ASSAY OF PHOSPHORUS: CPT

## 2017-08-30 PROCEDURE — 94640 AIRWAY INHALATION TREATMENT: CPT

## 2017-08-30 PROCEDURE — 6360000002 HC RX W HCPCS: Performed by: NURSE PRACTITIONER

## 2017-08-30 PROCEDURE — 2580000003 HC RX 258: Performed by: NEUROLOGICAL SURGERY

## 2017-08-30 PROCEDURE — 36415 COLL VENOUS BLD VENIPUNCTURE: CPT

## 2017-08-30 PROCEDURE — 83735 ASSAY OF MAGNESIUM: CPT

## 2017-08-30 PROCEDURE — 6360000002 HC RX W HCPCS: Performed by: INTERNAL MEDICINE

## 2017-08-30 PROCEDURE — 2580000003 HC RX 258: Performed by: EMERGENCY MEDICINE

## 2017-08-30 PROCEDURE — 92507 TX SP LANG VOICE COMM INDIV: CPT

## 2017-08-30 PROCEDURE — 99211 OFF/OP EST MAY X REQ PHY/QHP: CPT

## 2017-08-30 PROCEDURE — 82947 ASSAY GLUCOSE BLOOD QUANT: CPT

## 2017-08-30 PROCEDURE — 6370000000 HC RX 637 (ALT 250 FOR IP): Performed by: STUDENT IN AN ORGANIZED HEALTH CARE EDUCATION/TRAINING PROGRAM

## 2017-08-30 PROCEDURE — 92526 ORAL FUNCTION THERAPY: CPT

## 2017-08-30 PROCEDURE — 6370000000 HC RX 637 (ALT 250 FOR IP): Performed by: INTERNAL MEDICINE

## 2017-08-30 PROCEDURE — 97530 THERAPEUTIC ACTIVITIES: CPT

## 2017-08-30 PROCEDURE — 85025 COMPLETE CBC W/AUTO DIFF WBC: CPT

## 2017-08-30 PROCEDURE — 99232 SBSQ HOSP IP/OBS MODERATE 35: CPT | Performed by: NURSE PRACTITIONER

## 2017-08-30 PROCEDURE — 6360000002 HC RX W HCPCS: Performed by: EMERGENCY MEDICINE

## 2017-08-30 PROCEDURE — 94762 N-INVAS EAR/PLS OXIMTRY CONT: CPT

## 2017-08-30 PROCEDURE — 2500000003 HC RX 250 WO HCPCS: Performed by: STUDENT IN AN ORGANIZED HEALTH CARE EDUCATION/TRAINING PROGRAM

## 2017-08-30 PROCEDURE — 6360000002 HC RX W HCPCS: Performed by: NEUROLOGICAL SURGERY

## 2017-08-30 PROCEDURE — 99233 SBSQ HOSP IP/OBS HIGH 50: CPT | Performed by: PSYCHIATRY & NEUROLOGY

## 2017-08-30 PROCEDURE — S0028 INJECTION, FAMOTIDINE, 20 MG: HCPCS | Performed by: STUDENT IN AN ORGANIZED HEALTH CARE EDUCATION/TRAINING PROGRAM

## 2017-08-30 PROCEDURE — 80048 BASIC METABOLIC PNL TOTAL CA: CPT

## 2017-08-30 PROCEDURE — 80185 ASSAY OF PHENYTOIN TOTAL: CPT

## 2017-08-30 RX ORDER — MAGNESIUM SULFATE 1 G/100ML
1 INJECTION INTRAVENOUS
Status: DISCONTINUED | OUTPATIENT
Start: 2017-08-30 | End: 2017-08-30

## 2017-08-30 RX ORDER — SODIUM CHLORIDE 9 MG/ML
INJECTION, SOLUTION INTRAVENOUS CONTINUOUS
Status: DISCONTINUED | OUTPATIENT
Start: 2017-08-30 | End: 2017-09-01

## 2017-08-30 RX ADMIN — ALBUTEROL SULFATE 2.5 MG: 2.5 SOLUTION RESPIRATORY (INHALATION) at 14:21

## 2017-08-30 RX ADMIN — MAGNESIUM GLUCONATE 500 MG ORAL TABLET 400 MG: 500 TABLET ORAL at 09:20

## 2017-08-30 RX ADMIN — SODIUM CHLORIDE TAB 1 GM 1 G: 1 TAB at 12:29

## 2017-08-30 RX ADMIN — FAMOTIDINE 20 MG: 10 INJECTION, SOLUTION INTRAVENOUS at 09:20

## 2017-08-30 RX ADMIN — SALINE NASAL SPRAY 2 DROP: 1.5 SOLUTION NASAL at 09:14

## 2017-08-30 RX ADMIN — INSULIN LISPRO 1 UNITS: 100 INJECTION, SOLUTION INTRAVENOUS; SUBCUTANEOUS at 17:24

## 2017-08-30 RX ADMIN — SALINE NASAL SPRAY 2 DROP: 1.5 SOLUTION NASAL at 12:30

## 2017-08-30 RX ADMIN — HEPARIN SODIUM 5000 UNITS: 5000 INJECTION, SOLUTION INTRAVENOUS; SUBCUTANEOUS at 14:46

## 2017-08-30 RX ADMIN — MAGNESIUM SULFATE IN DEXTROSE 1 G: 10 INJECTION, SOLUTION INTRAVENOUS at 14:46

## 2017-08-30 RX ADMIN — FLUTICASONE PROPIONATE 1 SPRAY: 50 SPRAY, METERED NASAL at 09:15

## 2017-08-30 RX ADMIN — SPIRONOLACTONE 50 MG: 25 TABLET ORAL at 09:17

## 2017-08-30 RX ADMIN — ALBUTEROL SULFATE 2.5 MG: 2.5 SOLUTION RESPIRATORY (INHALATION) at 20:57

## 2017-08-30 RX ADMIN — HEPARIN SODIUM 5000 UNITS: 5000 INJECTION, SOLUTION INTRAVENOUS; SUBCUTANEOUS at 20:41

## 2017-08-30 RX ADMIN — LEVETIRACETAM 1500 MG: 500 INJECTION, SOLUTION, CONCENTRATE INTRAVENOUS at 20:42

## 2017-08-30 RX ADMIN — SALINE NASAL SPRAY 2 DROP: 1.5 SOLUTION NASAL at 17:30

## 2017-08-30 RX ADMIN — LEVETIRACETAM 1500 MG: 500 INJECTION, SOLUTION, CONCENTRATE INTRAVENOUS at 09:17

## 2017-08-30 RX ADMIN — HEPARIN SODIUM 5000 UNITS: 5000 INJECTION, SOLUTION INTRAVENOUS; SUBCUTANEOUS at 05:57

## 2017-08-30 RX ADMIN — MAGNESIUM SULFATE IN DEXTROSE 1 G: 10 INJECTION, SOLUTION INTRAVENOUS at 15:00

## 2017-08-30 RX ADMIN — ALBUTEROL SULFATE 2.5 MG: 2.5 SOLUTION RESPIRATORY (INHALATION) at 08:45

## 2017-08-30 RX ADMIN — Medication 10 ML: at 09:23

## 2017-08-30 RX ADMIN — CARVEDILOL 3.12 MG: 3.12 TABLET, FILM COATED ORAL at 09:19

## 2017-08-30 RX ADMIN — FENTANYL CITRATE 25 MCG: 50 INJECTION INTRAMUSCULAR; INTRAVENOUS at 10:13

## 2017-08-30 RX ADMIN — SPIRONOLACTONE 50 MG: 25 TABLET ORAL at 20:41

## 2017-08-30 RX ADMIN — Medication 10 ML: at 22:42

## 2017-08-30 RX ADMIN — SALINE NASAL SPRAY 2 DROP: 1.5 SOLUTION NASAL at 22:42

## 2017-08-30 RX ADMIN — POTASSIUM CHLORIDE 40 MEQ: 40 SOLUTION ORAL at 09:22

## 2017-08-30 RX ADMIN — ATORVASTATIN CALCIUM 40 MG: 40 TABLET, FILM COATED ORAL at 20:41

## 2017-08-30 RX ADMIN — SODIUM CHLORIDE TAB 1 GM 1 G: 1 TAB at 17:24

## 2017-08-30 RX ADMIN — PHENYTOIN SODIUM 75 MG: 50 INJECTION INTRAMUSCULAR; INTRAVENOUS at 09:21

## 2017-08-30 RX ADMIN — SODIUM CHLORIDE TAB 1 GM 1 G: 1 TAB at 09:18

## 2017-08-30 RX ADMIN — ALLOPURINOL 300 MG: 300 TABLET ORAL at 09:19

## 2017-08-30 RX ADMIN — CARVEDILOL 3.12 MG: 3.12 TABLET, FILM COATED ORAL at 17:24

## 2017-08-30 ASSESSMENT — ENCOUNTER SYMPTOMS
COUGH: 0
SORE THROAT: 0
STRIDOR: 0
HEMOPTYSIS: 0
ORTHOPNEA: 0
ABDOMINAL PAIN: 0
SPUTUM PRODUCTION: 0
WHEEZING: 0
SHORTNESS OF BREATH: 0

## 2017-08-30 ASSESSMENT — PAIN SCALES - GENERAL
PAINLEVEL_OUTOF10: 8
PAINLEVEL_OUTOF10: 0

## 2017-08-31 LAB
ABSOLUTE EOS #: 0.3 K/UL (ref 0–0.4)
ABSOLUTE LYMPH #: 2.4 K/UL (ref 1–4.8)
ABSOLUTE MONO #: 1.3 K/UL (ref 0.1–1.2)
ANION GAP SERPL CALCULATED.3IONS-SCNC: 13 MMOL/L (ref 9–17)
BASOPHILS # BLD: 1 %
BASOPHILS ABSOLUTE: 0.1 K/UL (ref 0–0.2)
BUN BLDV-MCNC: 8 MG/DL (ref 8–23)
BUN/CREAT BLD: ABNORMAL (ref 9–20)
CALCIUM SERPL-MCNC: 8.5 MG/DL (ref 8.6–10.4)
CHLORIDE BLD-SCNC: 97 MMOL/L (ref 98–107)
CO2: 23 MMOL/L (ref 20–31)
CREAT SERPL-MCNC: 0.51 MG/DL (ref 0.5–0.9)
DIFFERENTIAL TYPE: ABNORMAL
EOSINOPHILS RELATIVE PERCENT: 3 %
GFR AFRICAN AMERICAN: >60 ML/MIN
GFR NON-AFRICAN AMERICAN: >60 ML/MIN
GFR SERPL CREATININE-BSD FRML MDRD: ABNORMAL ML/MIN/{1.73_M2}
GFR SERPL CREATININE-BSD FRML MDRD: ABNORMAL ML/MIN/{1.73_M2}
GLUCOSE BLD-MCNC: 130 MG/DL (ref 65–105)
GLUCOSE BLD-MCNC: 152 MG/DL (ref 65–105)
GLUCOSE BLD-MCNC: 174 MG/DL (ref 70–99)
GLUCOSE BLD-MCNC: 93 MG/DL (ref 65–105)
GLUCOSE BLD-MCNC: 97 MG/DL (ref 65–105)
HCT VFR BLD CALC: 32.1 % (ref 36–46)
HEMOGLOBIN: 10.3 G/DL (ref 12–16)
LYMPHOCYTES # BLD: 20 %
MAGNESIUM: 1.7 MG/DL (ref 1.6–2.6)
MCH RBC QN AUTO: 31.1 PG (ref 26–34)
MCHC RBC AUTO-ENTMCNC: 32 G/DL (ref 31–37)
MCV RBC AUTO: 97.3 FL (ref 80–100)
MONOCYTES # BLD: 11 %
PDW BLD-RTO: 16.8 % (ref 12.5–15.4)
PHOSPHORUS: 4.2 MG/DL (ref 2.6–4.5)
PLATELET # BLD: 714 K/UL (ref 140–450)
PLATELET ESTIMATE: ABNORMAL
PMV BLD AUTO: 7.6 FL (ref 6–12)
POTASSIUM SERPL-SCNC: 3.6 MMOL/L (ref 3.7–5.3)
RBC # BLD: 3.3 M/UL (ref 4–5.2)
RBC # BLD: ABNORMAL 10*6/UL
SEG NEUTROPHILS: 65 %
SEGMENTED NEUTROPHILS ABSOLUTE COUNT: 8.2 K/UL (ref 1.8–7.7)
SODIUM BLD-SCNC: 133 MMOL/L (ref 135–144)
WBC # BLD: 12.3 K/UL (ref 3.5–11)
WBC # BLD: ABNORMAL 10*3/UL

## 2017-08-31 PROCEDURE — 6370000000 HC RX 637 (ALT 250 FOR IP): Performed by: STUDENT IN AN ORGANIZED HEALTH CARE EDUCATION/TRAINING PROGRAM

## 2017-08-31 PROCEDURE — 6370000000 HC RX 637 (ALT 250 FOR IP): Performed by: NURSE PRACTITIONER

## 2017-08-31 PROCEDURE — 97110 THERAPEUTIC EXERCISES: CPT

## 2017-08-31 PROCEDURE — 84100 ASSAY OF PHOSPHORUS: CPT

## 2017-08-31 PROCEDURE — 85025 COMPLETE CBC W/AUTO DIFF WBC: CPT

## 2017-08-31 PROCEDURE — 97535 SELF CARE MNGMENT TRAINING: CPT

## 2017-08-31 PROCEDURE — 6360000002 HC RX W HCPCS: Performed by: EMERGENCY MEDICINE

## 2017-08-31 PROCEDURE — 6360000002 HC RX W HCPCS: Performed by: NURSE PRACTITIONER

## 2017-08-31 PROCEDURE — 80048 BASIC METABOLIC PNL TOTAL CA: CPT

## 2017-08-31 PROCEDURE — 36415 COLL VENOUS BLD VENIPUNCTURE: CPT

## 2017-08-31 PROCEDURE — 6360000002 HC RX W HCPCS: Performed by: INTERNAL MEDICINE

## 2017-08-31 PROCEDURE — 82947 ASSAY GLUCOSE BLOOD QUANT: CPT

## 2017-08-31 PROCEDURE — 83735 ASSAY OF MAGNESIUM: CPT

## 2017-08-31 PROCEDURE — 99233 SBSQ HOSP IP/OBS HIGH 50: CPT | Performed by: NURSE PRACTITIONER

## 2017-08-31 PROCEDURE — 2580000003 HC RX 258: Performed by: EMERGENCY MEDICINE

## 2017-08-31 PROCEDURE — 6370000000 HC RX 637 (ALT 250 FOR IP): Performed by: EMERGENCY MEDICINE

## 2017-08-31 PROCEDURE — 94762 N-INVAS EAR/PLS OXIMTRY CONT: CPT

## 2017-08-31 PROCEDURE — 99232 SBSQ HOSP IP/OBS MODERATE 35: CPT | Performed by: INTERNAL MEDICINE

## 2017-08-31 PROCEDURE — 97530 THERAPEUTIC ACTIVITIES: CPT

## 2017-08-31 PROCEDURE — 2060000000 HC ICU INTERMEDIATE R&B

## 2017-08-31 PROCEDURE — 6370000000 HC RX 637 (ALT 250 FOR IP): Performed by: INTERNAL MEDICINE

## 2017-08-31 PROCEDURE — 2580000003 HC RX 258: Performed by: NEUROLOGICAL SURGERY

## 2017-08-31 RX ORDER — ALBUTEROL SULFATE 2.5 MG/3ML
2.5 SOLUTION RESPIRATORY (INHALATION) EVERY 6 HOURS PRN
Status: DISCONTINUED | OUTPATIENT
Start: 2017-08-31 | End: 2017-09-11

## 2017-08-31 RX ADMIN — Medication 10 ML: at 10:11

## 2017-08-31 RX ADMIN — SODIUM CHLORIDE TAB 1 GM 1 G: 1 TAB at 17:59

## 2017-08-31 RX ADMIN — SALINE NASAL SPRAY 2 DROP: 1.5 SOLUTION NASAL at 12:14

## 2017-08-31 RX ADMIN — LEVETIRACETAM 1500 MG: 500 INJECTION, SOLUTION, CONCENTRATE INTRAVENOUS at 10:27

## 2017-08-31 RX ADMIN — SALINE NASAL SPRAY 2 DROP: 1.5 SOLUTION NASAL at 15:35

## 2017-08-31 RX ADMIN — PHENYTOIN SODIUM 75 MG: 50 INJECTION INTRAMUSCULAR; INTRAVENOUS at 10:09

## 2017-08-31 RX ADMIN — ALBUTEROL SULFATE 2.5 MG: 2.5 SOLUTION RESPIRATORY (INHALATION) at 10:13

## 2017-08-31 RX ADMIN — HEPARIN SODIUM 5000 UNITS: 5000 INJECTION, SOLUTION INTRAVENOUS; SUBCUTANEOUS at 22:11

## 2017-08-31 RX ADMIN — LEVETIRACETAM 1500 MG: 500 INJECTION, SOLUTION, CONCENTRATE INTRAVENOUS at 22:17

## 2017-08-31 RX ADMIN — DOCUSATE SODIUM -SENNOSIDES 2 TABLET: 50; 8.6 TABLET, COATED ORAL at 10:07

## 2017-08-31 RX ADMIN — SODIUM CHLORIDE TAB 1 GM 1 G: 1 TAB at 10:09

## 2017-08-31 RX ADMIN — INSULIN LISPRO 1 UNITS: 100 INJECTION, SOLUTION INTRAVENOUS; SUBCUTANEOUS at 18:50

## 2017-08-31 RX ADMIN — SODIUM CHLORIDE TAB 1 GM 1 G: 1 TAB at 12:14

## 2017-08-31 RX ADMIN — MAGNESIUM GLUCONATE 500 MG ORAL TABLET 400 MG: 500 TABLET ORAL at 10:11

## 2017-08-31 RX ADMIN — CARVEDILOL 3.12 MG: 3.12 TABLET, FILM COATED ORAL at 10:08

## 2017-08-31 RX ADMIN — HEPARIN SODIUM 5000 UNITS: 5000 INJECTION, SOLUTION INTRAVENOUS; SUBCUTANEOUS at 13:55

## 2017-08-31 RX ADMIN — POLYETHYLENE GLYCOL 3350 17 G: 17 POWDER, FOR SOLUTION ORAL at 10:09

## 2017-08-31 RX ADMIN — ACETAMINOPHEN 650 MG: 325 TABLET ORAL at 15:34

## 2017-08-31 RX ADMIN — CARVEDILOL 3.12 MG: 3.12 TABLET, FILM COATED ORAL at 17:59

## 2017-08-31 RX ADMIN — ATORVASTATIN CALCIUM 40 MG: 40 TABLET, FILM COATED ORAL at 22:11

## 2017-08-31 RX ADMIN — ALLOPURINOL 300 MG: 300 TABLET ORAL at 10:10

## 2017-08-31 RX ADMIN — SPIRONOLACTONE 50 MG: 25 TABLET ORAL at 22:17

## 2017-08-31 RX ADMIN — HEPARIN SODIUM 5000 UNITS: 5000 INJECTION, SOLUTION INTRAVENOUS; SUBCUTANEOUS at 05:54

## 2017-08-31 RX ADMIN — SPIRONOLACTONE 50 MG: 25 TABLET ORAL at 10:07

## 2017-08-31 RX ADMIN — Medication 10 ML: at 22:17

## 2017-08-31 RX ADMIN — SALINE NASAL SPRAY 2 DROP: 1.5 SOLUTION NASAL at 22:18

## 2017-08-31 ASSESSMENT — PAIN SCALES - GENERAL
PAINLEVEL_OUTOF10: 6
PAINLEVEL_OUTOF10: 7

## 2017-08-31 ASSESSMENT — PAIN DESCRIPTION - PAIN TYPE
TYPE: ACUTE PAIN

## 2017-08-31 ASSESSMENT — ENCOUNTER SYMPTOMS
SPUTUM PRODUCTION: 0
WHEEZING: 0
ORTHOPNEA: 0
ABDOMINAL PAIN: 0
COUGH: 0
SORE THROAT: 0
HEMOPTYSIS: 0
SHORTNESS OF BREATH: 0
STRIDOR: 0

## 2017-08-31 ASSESSMENT — PAIN DESCRIPTION - ORIENTATION: ORIENTATION: RIGHT;LEFT

## 2017-08-31 ASSESSMENT — PAIN DESCRIPTION - LOCATION
LOCATION: LEG
LOCATION: BACK

## 2017-08-31 ASSESSMENT — PAIN DESCRIPTION - FREQUENCY: FREQUENCY: INTERMITTENT

## 2017-08-31 ASSESSMENT — PAIN DESCRIPTION - DESCRIPTORS
DESCRIPTORS: DISCOMFORT
DESCRIPTORS: ACHING

## 2017-09-01 ENCOUNTER — APPOINTMENT (OUTPATIENT)
Dept: GENERAL RADIOLOGY | Age: 77
DRG: 025 | End: 2017-09-01
Payer: MEDICARE

## 2017-09-01 LAB
ABSOLUTE EOS #: 0.3 K/UL (ref 0–0.4)
ABSOLUTE LYMPH #: 2 K/UL (ref 1–4.8)
ABSOLUTE MONO #: 0.9 K/UL (ref 0.1–1.2)
ANION GAP SERPL CALCULATED.3IONS-SCNC: 14 MMOL/L (ref 9–17)
BASOPHILS # BLD: 1 %
BASOPHILS ABSOLUTE: 0.1 K/UL (ref 0–0.2)
BUN BLDV-MCNC: 7 MG/DL (ref 8–23)
BUN/CREAT BLD: ABNORMAL (ref 9–20)
CALCIUM SERPL-MCNC: 8.7 MG/DL (ref 8.6–10.4)
CHLORIDE BLD-SCNC: 100 MMOL/L (ref 98–107)
CO2: 25 MMOL/L (ref 20–31)
CREAT SERPL-MCNC: 0.49 MG/DL (ref 0.5–0.9)
DIFFERENTIAL TYPE: ABNORMAL
EOSINOPHILS RELATIVE PERCENT: 3 %
GFR AFRICAN AMERICAN: >60 ML/MIN
GFR NON-AFRICAN AMERICAN: >60 ML/MIN
GFR SERPL CREATININE-BSD FRML MDRD: ABNORMAL ML/MIN/{1.73_M2}
GFR SERPL CREATININE-BSD FRML MDRD: ABNORMAL ML/MIN/{1.73_M2}
GLUCOSE BLD-MCNC: 105 MG/DL (ref 65–105)
GLUCOSE BLD-MCNC: 134 MG/DL (ref 65–105)
GLUCOSE BLD-MCNC: 167 MG/DL (ref 65–105)
GLUCOSE BLD-MCNC: 92 MG/DL (ref 65–105)
GLUCOSE BLD-MCNC: 98 MG/DL (ref 65–105)
GLUCOSE BLD-MCNC: 98 MG/DL (ref 70–99)
HCT VFR BLD CALC: 29.3 % (ref 36–46)
HEMOGLOBIN: 9.5 G/DL (ref 12–16)
KEPPRA: 32 UG/ML
LYMPHOCYTES # BLD: 19 %
MAGNESIUM: 1.8 MG/DL (ref 1.6–2.6)
MCH RBC QN AUTO: 30.9 PG (ref 26–34)
MCHC RBC AUTO-ENTMCNC: 32.4 G/DL (ref 31–37)
MCV RBC AUTO: 95.6 FL (ref 80–100)
MONOCYTES # BLD: 9 %
PDW BLD-RTO: 16.2 % (ref 12.5–15.4)
PHOSPHORUS: 4 MG/DL (ref 2.6–4.5)
PLATELET # BLD: 653 K/UL (ref 140–450)
PLATELET ESTIMATE: ABNORMAL
PMV BLD AUTO: 7.7 FL (ref 6–12)
POTASSIUM SERPL-SCNC: 3.5 MMOL/L (ref 3.7–5.3)
RBC # BLD: 3.06 M/UL (ref 4–5.2)
RBC # BLD: ABNORMAL 10*6/UL
SEG NEUTROPHILS: 68 %
SEGMENTED NEUTROPHILS ABSOLUTE COUNT: 7.4 K/UL (ref 1.8–7.7)
SODIUM BLD-SCNC: 139 MMOL/L (ref 135–144)
WBC # BLD: 10.7 K/UL (ref 3.5–11)
WBC # BLD: ABNORMAL 10*3/UL

## 2017-09-01 PROCEDURE — 94640 AIRWAY INHALATION TREATMENT: CPT

## 2017-09-01 PROCEDURE — 2060000000 HC ICU INTERMEDIATE R&B

## 2017-09-01 PROCEDURE — 92507 TX SP LANG VOICE COMM INDIV: CPT

## 2017-09-01 PROCEDURE — 6370000000 HC RX 637 (ALT 250 FOR IP): Performed by: NURSE PRACTITIONER

## 2017-09-01 PROCEDURE — 97110 THERAPEUTIC EXERCISES: CPT

## 2017-09-01 PROCEDURE — 83735 ASSAY OF MAGNESIUM: CPT

## 2017-09-01 PROCEDURE — 92526 ORAL FUNCTION THERAPY: CPT

## 2017-09-01 PROCEDURE — 92611 MOTION FLUOROSCOPY/SWALLOW: CPT

## 2017-09-01 PROCEDURE — 99233 SBSQ HOSP IP/OBS HIGH 50: CPT | Performed by: INTERNAL MEDICINE

## 2017-09-01 PROCEDURE — 36415 COLL VENOUS BLD VENIPUNCTURE: CPT

## 2017-09-01 PROCEDURE — 6370000000 HC RX 637 (ALT 250 FOR IP): Performed by: EMERGENCY MEDICINE

## 2017-09-01 PROCEDURE — 99232 SBSQ HOSP IP/OBS MODERATE 35: CPT | Performed by: INTERNAL MEDICINE

## 2017-09-01 PROCEDURE — 6370000000 HC RX 637 (ALT 250 FOR IP): Performed by: INTERNAL MEDICINE

## 2017-09-01 PROCEDURE — 6360000002 HC RX W HCPCS: Performed by: NURSE PRACTITIONER

## 2017-09-01 PROCEDURE — 74230 X-RAY XM SWLNG FUNCJ C+: CPT

## 2017-09-01 PROCEDURE — 6370000000 HC RX 637 (ALT 250 FOR IP): Performed by: HOSPITALIST

## 2017-09-01 PROCEDURE — 6370000000 HC RX 637 (ALT 250 FOR IP): Performed by: STUDENT IN AN ORGANIZED HEALTH CARE EDUCATION/TRAINING PROGRAM

## 2017-09-01 PROCEDURE — 94762 N-INVAS EAR/PLS OXIMTRY CONT: CPT

## 2017-09-01 PROCEDURE — 85025 COMPLETE CBC W/AUTO DIFF WBC: CPT

## 2017-09-01 PROCEDURE — 2580000003 HC RX 258: Performed by: NEUROLOGICAL SURGERY

## 2017-09-01 PROCEDURE — 99232 SBSQ HOSP IP/OBS MODERATE 35: CPT | Performed by: NURSE PRACTITIONER

## 2017-09-01 PROCEDURE — 80048 BASIC METABOLIC PNL TOTAL CA: CPT

## 2017-09-01 PROCEDURE — 99221 1ST HOSP IP/OBS SF/LOW 40: CPT | Performed by: PHYSICAL MEDICINE & REHABILITATION

## 2017-09-01 PROCEDURE — 6370000000 HC RX 637 (ALT 250 FOR IP): Performed by: NEUROLOGICAL SURGERY

## 2017-09-01 PROCEDURE — 80177 DRUG SCRN QUAN LEVETIRACETAM: CPT

## 2017-09-01 PROCEDURE — 97530 THERAPEUTIC ACTIVITIES: CPT

## 2017-09-01 PROCEDURE — G8997 SWALLOW GOAL STATUS: HCPCS

## 2017-09-01 PROCEDURE — 84100 ASSAY OF PHOSPHORUS: CPT

## 2017-09-01 PROCEDURE — G8996 SWALLOW CURRENT STATUS: HCPCS

## 2017-09-01 PROCEDURE — 99211 OFF/OP EST MAY X REQ PHY/QHP: CPT

## 2017-09-01 PROCEDURE — 82947 ASSAY GLUCOSE BLOOD QUANT: CPT

## 2017-09-01 RX ORDER — CARVEDILOL 6.25 MG/1
6.25 TABLET ORAL 2 TIMES DAILY WITH MEALS
Status: DISCONTINUED | OUTPATIENT
Start: 2017-09-01 | End: 2017-09-02

## 2017-09-01 RX ORDER — LEVETIRACETAM 500 MG/1
1500 TABLET ORAL 2 TIMES DAILY
Status: DISCONTINUED | OUTPATIENT
Start: 2017-09-01 | End: 2017-09-11 | Stop reason: HOSPADM

## 2017-09-01 RX ADMIN — PHENYTOIN SODIUM 75 MG: 50 INJECTION INTRAMUSCULAR; INTRAVENOUS at 09:04

## 2017-09-01 RX ADMIN — SALINE NASAL SPRAY 2 DROP: 1.5 SOLUTION NASAL at 13:14

## 2017-09-01 RX ADMIN — ATORVASTATIN CALCIUM 40 MG: 40 TABLET, FILM COATED ORAL at 22:35

## 2017-09-01 RX ADMIN — HEPARIN SODIUM 5000 UNITS: 5000 INJECTION, SOLUTION INTRAVENOUS; SUBCUTANEOUS at 15:07

## 2017-09-01 RX ADMIN — POTASSIUM CHLORIDE 40 MEQ: 40 SOLUTION ORAL at 09:01

## 2017-09-01 RX ADMIN — SODIUM CHLORIDE TAB 1 GM 1 G: 1 TAB at 13:14

## 2017-09-01 RX ADMIN — CARVEDILOL 6.25 MG: 6.25 TABLET, FILM COATED ORAL at 17:38

## 2017-09-01 RX ADMIN — FLUTICASONE PROPIONATE 1 SPRAY: 50 SPRAY, METERED NASAL at 09:04

## 2017-09-01 RX ADMIN — SODIUM CHLORIDE TAB 1 GM 1 G: 1 TAB at 09:06

## 2017-09-01 RX ADMIN — SALINE NASAL SPRAY 2 DROP: 1.5 SOLUTION NASAL at 09:04

## 2017-09-01 RX ADMIN — HEPARIN SODIUM 5000 UNITS: 5000 INJECTION, SOLUTION INTRAVENOUS; SUBCUTANEOUS at 21:32

## 2017-09-01 RX ADMIN — SPIRONOLACTONE 50 MG: 25 TABLET ORAL at 09:06

## 2017-09-01 RX ADMIN — SODIUM CHLORIDE TAB 1 GM 1 G: 1 TAB at 18:18

## 2017-09-01 RX ADMIN — Medication 10 ML: at 09:06

## 2017-09-01 RX ADMIN — LEVETIRACETAM 1500 MG: 500 TABLET, FILM COATED ORAL at 21:31

## 2017-09-01 RX ADMIN — SALINE NASAL SPRAY 2 DROP: 1.5 SOLUTION NASAL at 15:07

## 2017-09-01 RX ADMIN — INSULIN LISPRO 1 UNITS: 100 INJECTION, SOLUTION INTRAVENOUS; SUBCUTANEOUS at 18:17

## 2017-09-01 RX ADMIN — SALINE NASAL SPRAY 2 DROP: 1.5 SOLUTION NASAL at 22:36

## 2017-09-01 RX ADMIN — LEVETIRACETAM 1500 MG: 500 TABLET, FILM COATED ORAL at 10:49

## 2017-09-01 RX ADMIN — HEPARIN SODIUM 5000 UNITS: 5000 INJECTION, SOLUTION INTRAVENOUS; SUBCUTANEOUS at 06:31

## 2017-09-01 RX ADMIN — MAGNESIUM GLUCONATE 500 MG ORAL TABLET 400 MG: 500 TABLET ORAL at 09:08

## 2017-09-01 RX ADMIN — MOMETASONE FUROATE AND FORMOTEROL FUMARATE DIHYDRATE 2 PUFF: 200; 5 AEROSOL RESPIRATORY (INHALATION) at 09:23

## 2017-09-01 RX ADMIN — CARVEDILOL 3.12 MG: 3.12 TABLET, FILM COATED ORAL at 08:59

## 2017-09-01 RX ADMIN — MOMETASONE FUROATE AND FORMOTEROL FUMARATE DIHYDRATE 2 PUFF: 200; 5 AEROSOL RESPIRATORY (INHALATION) at 20:50

## 2017-09-01 RX ADMIN — ALLOPURINOL 300 MG: 300 TABLET ORAL at 10:49

## 2017-09-01 RX ADMIN — Medication 10 ML: at 22:36

## 2017-09-02 PROBLEM — B37.0 CANDIDA, ORAL: Status: ACTIVE | Noted: 2017-09-02

## 2017-09-02 LAB
ABSOLUTE EOS #: 0.3 K/UL (ref 0–0.4)
ABSOLUTE LYMPH #: 2.2 K/UL (ref 1–4.8)
ABSOLUTE MONO #: 0.9 K/UL (ref 0.1–1.2)
ANION GAP SERPL CALCULATED.3IONS-SCNC: 17 MMOL/L (ref 9–17)
BASOPHILS # BLD: 1 %
BASOPHILS ABSOLUTE: 0.1 K/UL (ref 0–0.2)
BUN BLDV-MCNC: 7 MG/DL (ref 8–23)
BUN/CREAT BLD: ABNORMAL (ref 9–20)
CALCIUM SERPL-MCNC: 9.3 MG/DL (ref 8.6–10.4)
CHLORIDE BLD-SCNC: 100 MMOL/L (ref 98–107)
CO2: 25 MMOL/L (ref 20–31)
CREAT SERPL-MCNC: 0.58 MG/DL (ref 0.5–0.9)
DIFFERENTIAL TYPE: ABNORMAL
EOSINOPHILS RELATIVE PERCENT: 3 %
GFR AFRICAN AMERICAN: >60 ML/MIN
GFR NON-AFRICAN AMERICAN: >60 ML/MIN
GFR SERPL CREATININE-BSD FRML MDRD: ABNORMAL ML/MIN/{1.73_M2}
GFR SERPL CREATININE-BSD FRML MDRD: ABNORMAL ML/MIN/{1.73_M2}
GLUCOSE BLD-MCNC: 100 MG/DL (ref 65–105)
GLUCOSE BLD-MCNC: 104 MG/DL (ref 70–99)
GLUCOSE BLD-MCNC: 153 MG/DL (ref 65–105)
GLUCOSE BLD-MCNC: 186 MG/DL (ref 65–105)
HCT VFR BLD CALC: 30.1 % (ref 36–46)
HEMOGLOBIN: 9.6 G/DL (ref 12–16)
LYMPHOCYTES # BLD: 23 %
MCH RBC QN AUTO: 30.9 PG (ref 26–34)
MCHC RBC AUTO-ENTMCNC: 32.1 G/DL (ref 31–37)
MCV RBC AUTO: 96.4 FL (ref 80–100)
MONOCYTES # BLD: 9 %
PDW BLD-RTO: 16.9 % (ref 12.5–15.4)
PLATELET # BLD: 637 K/UL (ref 140–450)
PLATELET ESTIMATE: ABNORMAL
PMV BLD AUTO: 7.8 FL (ref 6–12)
POTASSIUM SERPL-SCNC: 3.8 MMOL/L (ref 3.7–5.3)
RBC # BLD: 3.12 M/UL (ref 4–5.2)
RBC # BLD: ABNORMAL 10*6/UL
SEG NEUTROPHILS: 64 %
SEGMENTED NEUTROPHILS ABSOLUTE COUNT: 6.3 K/UL (ref 1.8–7.7)
SODIUM BLD-SCNC: 142 MMOL/L (ref 135–144)
WBC # BLD: 9.9 K/UL (ref 3.5–11)
WBC # BLD: ABNORMAL 10*3/UL

## 2017-09-02 PROCEDURE — 6370000000 HC RX 637 (ALT 250 FOR IP): Performed by: HOSPITALIST

## 2017-09-02 PROCEDURE — 82947 ASSAY GLUCOSE BLOOD QUANT: CPT

## 2017-09-02 PROCEDURE — 97110 THERAPEUTIC EXERCISES: CPT

## 2017-09-02 PROCEDURE — 6360000002 HC RX W HCPCS: Performed by: NURSE PRACTITIONER

## 2017-09-02 PROCEDURE — 6370000000 HC RX 637 (ALT 250 FOR IP): Performed by: INTERNAL MEDICINE

## 2017-09-02 PROCEDURE — 99232 SBSQ HOSP IP/OBS MODERATE 35: CPT | Performed by: NURSE PRACTITIONER

## 2017-09-02 PROCEDURE — 6370000000 HC RX 637 (ALT 250 FOR IP): Performed by: EMERGENCY MEDICINE

## 2017-09-02 PROCEDURE — 36415 COLL VENOUS BLD VENIPUNCTURE: CPT

## 2017-09-02 PROCEDURE — 93970 EXTREMITY STUDY: CPT

## 2017-09-02 PROCEDURE — 6370000000 HC RX 637 (ALT 250 FOR IP): Performed by: STUDENT IN AN ORGANIZED HEALTH CARE EDUCATION/TRAINING PROGRAM

## 2017-09-02 PROCEDURE — 6370000000 HC RX 637 (ALT 250 FOR IP): Performed by: NURSE PRACTITIONER

## 2017-09-02 PROCEDURE — 2580000003 HC RX 258: Performed by: NEUROLOGICAL SURGERY

## 2017-09-02 PROCEDURE — 97530 THERAPEUTIC ACTIVITIES: CPT

## 2017-09-02 PROCEDURE — 99233 SBSQ HOSP IP/OBS HIGH 50: CPT | Performed by: INTERNAL MEDICINE

## 2017-09-02 PROCEDURE — 85025 COMPLETE CBC W/AUTO DIFF WBC: CPT

## 2017-09-02 PROCEDURE — 94762 N-INVAS EAR/PLS OXIMTRY CONT: CPT

## 2017-09-02 PROCEDURE — 94640 AIRWAY INHALATION TREATMENT: CPT

## 2017-09-02 PROCEDURE — 2060000000 HC ICU INTERMEDIATE R&B

## 2017-09-02 PROCEDURE — 80048 BASIC METABOLIC PNL TOTAL CA: CPT

## 2017-09-02 RX ORDER — CARVEDILOL 12.5 MG/1
12.5 TABLET ORAL 2 TIMES DAILY WITH MEALS
Status: DISCONTINUED | OUTPATIENT
Start: 2017-09-02 | End: 2017-09-02

## 2017-09-02 RX ORDER — CARVEDILOL 6.25 MG/1
6.25 TABLET ORAL 2 TIMES DAILY WITH MEALS
Status: DISCONTINUED | OUTPATIENT
Start: 2017-09-02 | End: 2017-09-11 | Stop reason: HOSPADM

## 2017-09-02 RX ORDER — ACETAMINOPHEN AND CODEINE PHOSPHATE 300; 30 MG/1; MG/1
1 TABLET ORAL EVERY 6 HOURS PRN
Status: DISCONTINUED | OUTPATIENT
Start: 2017-09-02 | End: 2017-09-11 | Stop reason: HOSPADM

## 2017-09-02 RX ADMIN — ACETAMINOPHEN 650 MG: 325 TABLET ORAL at 09:03

## 2017-09-02 RX ADMIN — CARVEDILOL 6.25 MG: 6.25 TABLET, FILM COATED ORAL at 18:37

## 2017-09-02 RX ADMIN — ALLOPURINOL 300 MG: 300 TABLET ORAL at 18:38

## 2017-09-02 RX ADMIN — HEPARIN SODIUM 5000 UNITS: 5000 INJECTION, SOLUTION INTRAVENOUS; SUBCUTANEOUS at 13:15

## 2017-09-02 RX ADMIN — ACETAMINOPHEN 650 MG: 325 TABLET ORAL at 23:31

## 2017-09-02 RX ADMIN — Medication 10 ML: at 23:32

## 2017-09-02 RX ADMIN — MAGNESIUM GLUCONATE 500 MG ORAL TABLET 400 MG: 500 TABLET ORAL at 08:23

## 2017-09-02 RX ADMIN — LEVETIRACETAM 1500 MG: 500 TABLET, FILM COATED ORAL at 23:31

## 2017-09-02 RX ADMIN — SODIUM CHLORIDE TAB 1 GM 1 G: 1 TAB at 18:39

## 2017-09-02 RX ADMIN — POLYETHYLENE GLYCOL 3350 17 G: 17 POWDER, FOR SOLUTION ORAL at 08:23

## 2017-09-02 RX ADMIN — MOMETASONE FUROATE AND FORMOTEROL FUMARATE DIHYDRATE 2 PUFF: 200; 5 AEROSOL RESPIRATORY (INHALATION) at 08:36

## 2017-09-02 RX ADMIN — CARVEDILOL 12.5 MG: 12.5 TABLET, FILM COATED ORAL at 08:32

## 2017-09-02 RX ADMIN — NYSTATIN 500000 UNITS: 100000 SUSPENSION ORAL at 23:31

## 2017-09-02 RX ADMIN — Medication 10 ML: at 08:24

## 2017-09-02 RX ADMIN — MOMETASONE FUROATE AND FORMOTEROL FUMARATE DIHYDRATE 2 PUFF: 200; 5 AEROSOL RESPIRATORY (INHALATION) at 20:26

## 2017-09-02 RX ADMIN — LEVETIRACETAM 1500 MG: 500 TABLET, FILM COATED ORAL at 08:23

## 2017-09-02 RX ADMIN — SODIUM CHLORIDE TAB 1 GM 1 G: 1 TAB at 08:23

## 2017-09-02 RX ADMIN — SALINE NASAL SPRAY 2 DROP: 1.5 SOLUTION NASAL at 08:23

## 2017-09-02 RX ADMIN — SPIRONOLACTONE 50 MG: 25 TABLET ORAL at 08:23

## 2017-09-02 RX ADMIN — SALINE NASAL SPRAY 2 DROP: 1.5 SOLUTION NASAL at 18:38

## 2017-09-02 RX ADMIN — NYSTATIN 500000 UNITS: 100000 SUSPENSION ORAL at 09:03

## 2017-09-02 RX ADMIN — SPIRONOLACTONE 50 MG: 25 TABLET ORAL at 23:32

## 2017-09-02 RX ADMIN — NYSTATIN 500000 UNITS: 100000 SUSPENSION ORAL at 13:15

## 2017-09-02 RX ADMIN — DOCUSATE SODIUM -SENNOSIDES 2 TABLET: 50; 8.6 TABLET, COATED ORAL at 08:24

## 2017-09-02 RX ADMIN — ATORVASTATIN CALCIUM 40 MG: 40 TABLET, FILM COATED ORAL at 23:31

## 2017-09-02 RX ADMIN — NYSTATIN 500000 UNITS: 100000 SUSPENSION ORAL at 18:36

## 2017-09-02 RX ADMIN — HEPARIN SODIUM 5000 UNITS: 5000 INJECTION, SOLUTION INTRAVENOUS; SUBCUTANEOUS at 23:31

## 2017-09-02 RX ADMIN — FLUTICASONE PROPIONATE 1 SPRAY: 50 SPRAY, METERED NASAL at 08:23

## 2017-09-02 RX ADMIN — ACETAMINOPHEN 650 MG: 325 TABLET ORAL at 13:08

## 2017-09-02 RX ADMIN — HEPARIN SODIUM 5000 UNITS: 5000 INJECTION, SOLUTION INTRAVENOUS; SUBCUTANEOUS at 08:25

## 2017-09-02 ASSESSMENT — PAIN SCALES - GENERAL
PAINLEVEL_OUTOF10: 5
PAINLEVEL_OUTOF10: 7
PAINLEVEL_OUTOF10: 9

## 2017-09-03 LAB
ABSOLUTE EOS #: 0.2 K/UL (ref 0–0.4)
ABSOLUTE LYMPH #: 2.2 K/UL (ref 1–4.8)
ABSOLUTE MONO #: 1.2 K/UL (ref 0.1–1.2)
ANION GAP SERPL CALCULATED.3IONS-SCNC: 13 MMOL/L (ref 9–17)
BASOPHILS # BLD: 0 %
BASOPHILS ABSOLUTE: 0 K/UL (ref 0–0.2)
BUN BLDV-MCNC: 10 MG/DL (ref 8–23)
BUN/CREAT BLD: ABNORMAL (ref 9–20)
CALCIUM SERPL-MCNC: 9.3 MG/DL (ref 8.6–10.4)
CHLORIDE BLD-SCNC: 94 MMOL/L (ref 98–107)
CO2: 26 MMOL/L (ref 20–31)
CREAT SERPL-MCNC: 0.59 MG/DL (ref 0.5–0.9)
DIFFERENTIAL TYPE: ABNORMAL
EOSINOPHILS RELATIVE PERCENT: 2 %
GFR AFRICAN AMERICAN: >60 ML/MIN
GFR NON-AFRICAN AMERICAN: >60 ML/MIN
GFR SERPL CREATININE-BSD FRML MDRD: ABNORMAL ML/MIN/{1.73_M2}
GFR SERPL CREATININE-BSD FRML MDRD: ABNORMAL ML/MIN/{1.73_M2}
GLUCOSE BLD-MCNC: 108 MG/DL (ref 65–105)
GLUCOSE BLD-MCNC: 116 MG/DL (ref 65–105)
GLUCOSE BLD-MCNC: 116 MG/DL (ref 70–99)
GLUCOSE BLD-MCNC: 124 MG/DL (ref 65–105)
GLUCOSE BLD-MCNC: 151 MG/DL (ref 65–105)
GLUCOSE BLD-MCNC: 158 MG/DL (ref 65–105)
HCT VFR BLD CALC: 31.3 % (ref 36–46)
HEMOGLOBIN: 10 G/DL (ref 12–16)
LYMPHOCYTES # BLD: 22 %
MCH RBC QN AUTO: 31.1 PG (ref 26–34)
MCHC RBC AUTO-ENTMCNC: 32.1 G/DL (ref 31–37)
MCV RBC AUTO: 97 FL (ref 80–100)
MONOCYTES # BLD: 11 %
PDW BLD-RTO: 16.7 % (ref 12.5–15.4)
PLATELET # BLD: 487 K/UL (ref 140–450)
PLATELET ESTIMATE: ABNORMAL
PMV BLD AUTO: 7.7 FL (ref 6–12)
POTASSIUM SERPL-SCNC: 3.9 MMOL/L (ref 3.7–5.3)
RBC # BLD: 3.22 M/UL (ref 4–5.2)
RBC # BLD: ABNORMAL 10*6/UL
SEG NEUTROPHILS: 65 %
SEGMENTED NEUTROPHILS ABSOLUTE COUNT: 6.5 K/UL (ref 1.8–7.7)
SODIUM BLD-SCNC: 133 MMOL/L (ref 135–144)
WBC # BLD: 10.2 K/UL (ref 3.5–11)
WBC # BLD: ABNORMAL 10*3/UL

## 2017-09-03 PROCEDURE — 6370000000 HC RX 637 (ALT 250 FOR IP): Performed by: EMERGENCY MEDICINE

## 2017-09-03 PROCEDURE — 6370000000 HC RX 637 (ALT 250 FOR IP): Performed by: NURSE PRACTITIONER

## 2017-09-03 PROCEDURE — 6370000000 HC RX 637 (ALT 250 FOR IP): Performed by: STUDENT IN AN ORGANIZED HEALTH CARE EDUCATION/TRAINING PROGRAM

## 2017-09-03 PROCEDURE — 6360000002 HC RX W HCPCS: Performed by: NURSE PRACTITIONER

## 2017-09-03 PROCEDURE — 99233 SBSQ HOSP IP/OBS HIGH 50: CPT | Performed by: INTERNAL MEDICINE

## 2017-09-03 PROCEDURE — 82947 ASSAY GLUCOSE BLOOD QUANT: CPT

## 2017-09-03 PROCEDURE — 2060000000 HC ICU INTERMEDIATE R&B

## 2017-09-03 PROCEDURE — 6370000000 HC RX 637 (ALT 250 FOR IP): Performed by: NEUROLOGICAL SURGERY

## 2017-09-03 PROCEDURE — 85025 COMPLETE CBC W/AUTO DIFF WBC: CPT

## 2017-09-03 PROCEDURE — 6370000000 HC RX 637 (ALT 250 FOR IP): Performed by: HOSPITALIST

## 2017-09-03 PROCEDURE — 80048 BASIC METABOLIC PNL TOTAL CA: CPT

## 2017-09-03 PROCEDURE — 36415 COLL VENOUS BLD VENIPUNCTURE: CPT

## 2017-09-03 PROCEDURE — 99232 SBSQ HOSP IP/OBS MODERATE 35: CPT | Performed by: PSYCHIATRY & NEUROLOGY

## 2017-09-03 PROCEDURE — 94762 N-INVAS EAR/PLS OXIMTRY CONT: CPT

## 2017-09-03 PROCEDURE — 6370000000 HC RX 637 (ALT 250 FOR IP): Performed by: INTERNAL MEDICINE

## 2017-09-03 PROCEDURE — 2580000003 HC RX 258: Performed by: NEUROLOGICAL SURGERY

## 2017-09-03 RX ADMIN — NYSTATIN 500000 UNITS: 100000 SUSPENSION ORAL at 08:55

## 2017-09-03 RX ADMIN — NYSTATIN 500000 UNITS: 100000 SUSPENSION ORAL at 20:44

## 2017-09-03 RX ADMIN — ALLOPURINOL 300 MG: 300 TABLET ORAL at 08:55

## 2017-09-03 RX ADMIN — MAGNESIUM GLUCONATE 500 MG ORAL TABLET 400 MG: 500 TABLET ORAL at 08:55

## 2017-09-03 RX ADMIN — FLUTICASONE PROPIONATE 1 SPRAY: 50 SPRAY, METERED NASAL at 08:55

## 2017-09-03 RX ADMIN — LEVETIRACETAM 1500 MG: 500 TABLET, FILM COATED ORAL at 20:44

## 2017-09-03 RX ADMIN — SALINE NASAL SPRAY 2 DROP: 1.5 SOLUTION NASAL at 08:55

## 2017-09-03 RX ADMIN — NYSTATIN 500000 UNITS: 100000 SUSPENSION ORAL at 12:08

## 2017-09-03 RX ADMIN — SALINE NASAL SPRAY 2 DROP: 1.5 SOLUTION NASAL at 12:00

## 2017-09-03 RX ADMIN — SPIRONOLACTONE 50 MG: 25 TABLET ORAL at 08:54

## 2017-09-03 RX ADMIN — CARVEDILOL 6.25 MG: 6.25 TABLET, FILM COATED ORAL at 19:05

## 2017-09-03 RX ADMIN — HEPARIN SODIUM 5000 UNITS: 5000 INJECTION, SOLUTION INTRAVENOUS; SUBCUTANEOUS at 14:39

## 2017-09-03 RX ADMIN — SALINE NASAL SPRAY 2 DROP: 1.5 SOLUTION NASAL at 20:44

## 2017-09-03 RX ADMIN — HEPARIN SODIUM 5000 UNITS: 5000 INJECTION, SOLUTION INTRAVENOUS; SUBCUTANEOUS at 06:29

## 2017-09-03 RX ADMIN — SODIUM CHLORIDE TAB 1 GM 1 G: 1 TAB at 19:05

## 2017-09-03 RX ADMIN — LEVETIRACETAM 1500 MG: 500 TABLET, FILM COATED ORAL at 08:55

## 2017-09-03 RX ADMIN — INSULIN LISPRO 2 UNITS: 100 INJECTION, SOLUTION INTRAVENOUS; SUBCUTANEOUS at 12:08

## 2017-09-03 RX ADMIN — SALINE NASAL SPRAY 2 DROP: 1.5 SOLUTION NASAL at 19:05

## 2017-09-03 RX ADMIN — POTASSIUM CHLORIDE 40 MEQ: 40 SOLUTION ORAL at 12:12

## 2017-09-03 RX ADMIN — Medication 10 ML: at 08:56

## 2017-09-03 RX ADMIN — SODIUM CHLORIDE TAB 1 GM 1 G: 1 TAB at 14:39

## 2017-09-03 RX ADMIN — CARVEDILOL 6.25 MG: 6.25 TABLET, FILM COATED ORAL at 08:55

## 2017-09-03 RX ADMIN — DOCUSATE SODIUM -SENNOSIDES 2 TABLET: 50; 8.6 TABLET, COATED ORAL at 08:55

## 2017-09-03 RX ADMIN — ACETAMINOPHEN 650 MG: 325 TABLET ORAL at 19:10

## 2017-09-03 RX ADMIN — HEPARIN SODIUM 5000 UNITS: 5000 INJECTION, SOLUTION INTRAVENOUS; SUBCUTANEOUS at 20:45

## 2017-09-03 RX ADMIN — Medication 1 LOZENGE: at 20:44

## 2017-09-03 RX ADMIN — SODIUM CHLORIDE TAB 1 GM 1 G: 1 TAB at 08:55

## 2017-09-03 RX ADMIN — Medication 10 ML: at 20:45

## 2017-09-03 RX ADMIN — POLYETHYLENE GLYCOL 3350 17 G: 17 POWDER, FOR SOLUTION ORAL at 08:55

## 2017-09-03 RX ADMIN — ATORVASTATIN CALCIUM 40 MG: 40 TABLET, FILM COATED ORAL at 22:21

## 2017-09-03 RX ADMIN — SPIRONOLACTONE 50 MG: 25 TABLET ORAL at 20:45

## 2017-09-03 RX ADMIN — NYSTATIN 500000 UNITS: 100000 SUSPENSION ORAL at 19:05

## 2017-09-03 ASSESSMENT — PAIN SCALES - GENERAL
PAINLEVEL_OUTOF10: 3
PAINLEVEL_OUTOF10: 3
PAINLEVEL_OUTOF10: 7

## 2017-09-03 ASSESSMENT — PAIN DESCRIPTION - DESCRIPTORS: DESCRIPTORS: HEADACHE

## 2017-09-03 ASSESSMENT — PAIN DESCRIPTION - LOCATION: LOCATION: HEAD

## 2017-09-03 ASSESSMENT — PAIN DESCRIPTION - PAIN TYPE: TYPE: ACUTE PAIN

## 2017-09-03 ASSESSMENT — PAIN DESCRIPTION - FREQUENCY: FREQUENCY: INTERMITTENT

## 2017-09-04 LAB
ABSOLUTE EOS #: 0.3 K/UL (ref 0–0.4)
ABSOLUTE LYMPH #: 2 K/UL (ref 1–4.8)
ABSOLUTE MONO #: 1.3 K/UL (ref 0.1–1.2)
ANION GAP SERPL CALCULATED.3IONS-SCNC: 11 MMOL/L (ref 9–17)
BASOPHILS # BLD: 1 %
BASOPHILS ABSOLUTE: 0 K/UL (ref 0–0.2)
BUN BLDV-MCNC: 10 MG/DL (ref 8–23)
BUN/CREAT BLD: ABNORMAL (ref 9–20)
CALCIUM SERPL-MCNC: 9.4 MG/DL (ref 8.6–10.4)
CHLORIDE BLD-SCNC: 93 MMOL/L (ref 98–107)
CO2: 28 MMOL/L (ref 20–31)
CREAT SERPL-MCNC: 0.61 MG/DL (ref 0.5–0.9)
DIFFERENTIAL TYPE: ABNORMAL
EOSINOPHILS RELATIVE PERCENT: 3 %
GFR AFRICAN AMERICAN: >60 ML/MIN
GFR NON-AFRICAN AMERICAN: >60 ML/MIN
GFR SERPL CREATININE-BSD FRML MDRD: ABNORMAL ML/MIN/{1.73_M2}
GFR SERPL CREATININE-BSD FRML MDRD: ABNORMAL ML/MIN/{1.73_M2}
GLUCOSE BLD-MCNC: 108 MG/DL (ref 70–99)
GLUCOSE BLD-MCNC: 123 MG/DL (ref 65–105)
GLUCOSE BLD-MCNC: 140 MG/DL (ref 65–105)
GLUCOSE BLD-MCNC: 186 MG/DL (ref 65–105)
GLUCOSE BLD-MCNC: 205 MG/DL (ref 65–105)
HCT VFR BLD CALC: 30 % (ref 36–46)
HCT VFR BLD CALC: 30.4 % (ref 36–46)
HEMOGLOBIN: 10 G/DL (ref 12–16)
HEMOGLOBIN: 9.6 G/DL (ref 12–16)
INR BLD: 1
LYMPHOCYTES # BLD: 22 %
MCH RBC QN AUTO: 31 PG (ref 26–34)
MCH RBC QN AUTO: 31.4 PG (ref 26–34)
MCHC RBC AUTO-ENTMCNC: 32 G/DL (ref 31–37)
MCHC RBC AUTO-ENTMCNC: 32.9 G/DL (ref 31–37)
MCV RBC AUTO: 95.3 FL (ref 80–100)
MCV RBC AUTO: 96.9 FL (ref 80–100)
MONOCYTES # BLD: 14 %
PARTIAL THROMBOPLASTIN TIME: 21.9 SEC (ref 21.3–31.3)
PDW BLD-RTO: 16.3 % (ref 12.5–15.4)
PDW BLD-RTO: 16.5 % (ref 12.5–15.4)
PLATELET # BLD: 452 K/UL (ref 140–450)
PLATELET # BLD: 475 K/UL (ref 140–450)
PLATELET ESTIMATE: ABNORMAL
PMV BLD AUTO: 7.4 FL (ref 6–12)
PMV BLD AUTO: 7.6 FL (ref 6–12)
POTASSIUM SERPL-SCNC: 4 MMOL/L (ref 3.7–5.3)
PROTHROMBIN TIME: 10.8 SEC (ref 9.4–12.6)
RBC # BLD: 3.09 M/UL (ref 4–5.2)
RBC # BLD: 3.19 M/UL (ref 4–5.2)
RBC # BLD: ABNORMAL 10*6/UL
SEG NEUTROPHILS: 60 %
SEGMENTED NEUTROPHILS ABSOLUTE COUNT: 5.7 K/UL (ref 1.8–7.7)
SODIUM BLD-SCNC: 132 MMOL/L (ref 135–144)
WBC # BLD: 9 K/UL (ref 3.5–11)
WBC # BLD: 9.3 K/UL (ref 3.5–11)
WBC # BLD: ABNORMAL 10*3/UL

## 2017-09-04 PROCEDURE — 6370000000 HC RX 637 (ALT 250 FOR IP): Performed by: EMERGENCY MEDICINE

## 2017-09-04 PROCEDURE — 6370000000 HC RX 637 (ALT 250 FOR IP): Performed by: STUDENT IN AN ORGANIZED HEALTH CARE EDUCATION/TRAINING PROGRAM

## 2017-09-04 PROCEDURE — 99233 SBSQ HOSP IP/OBS HIGH 50: CPT | Performed by: INTERNAL MEDICINE

## 2017-09-04 PROCEDURE — 85027 COMPLETE CBC AUTOMATED: CPT

## 2017-09-04 PROCEDURE — 6360000002 HC RX W HCPCS: Performed by: INTERNAL MEDICINE

## 2017-09-04 PROCEDURE — 99232 SBSQ HOSP IP/OBS MODERATE 35: CPT | Performed by: PSYCHIATRY & NEUROLOGY

## 2017-09-04 PROCEDURE — 2060000000 HC ICU INTERMEDIATE R&B

## 2017-09-04 PROCEDURE — 6370000000 HC RX 637 (ALT 250 FOR IP): Performed by: INTERNAL MEDICINE

## 2017-09-04 PROCEDURE — 85610 PROTHROMBIN TIME: CPT

## 2017-09-04 PROCEDURE — 85730 THROMBOPLASTIN TIME PARTIAL: CPT

## 2017-09-04 PROCEDURE — 82947 ASSAY GLUCOSE BLOOD QUANT: CPT

## 2017-09-04 PROCEDURE — 2580000003 HC RX 258: Performed by: NEUROLOGICAL SURGERY

## 2017-09-04 PROCEDURE — 85025 COMPLETE CBC W/AUTO DIFF WBC: CPT

## 2017-09-04 PROCEDURE — 36415 COLL VENOUS BLD VENIPUNCTURE: CPT

## 2017-09-04 PROCEDURE — 94762 N-INVAS EAR/PLS OXIMTRY CONT: CPT

## 2017-09-04 PROCEDURE — 6370000000 HC RX 637 (ALT 250 FOR IP): Performed by: HOSPITALIST

## 2017-09-04 PROCEDURE — 99223 1ST HOSP IP/OBS HIGH 75: CPT | Performed by: INTERNAL MEDICINE

## 2017-09-04 PROCEDURE — 80048 BASIC METABOLIC PNL TOTAL CA: CPT

## 2017-09-04 PROCEDURE — 6370000000 HC RX 637 (ALT 250 FOR IP): Performed by: NURSE PRACTITIONER

## 2017-09-04 RX ORDER — WARFARIN SODIUM 2.5 MG/1
2.5 TABLET ORAL DAILY
Status: DISCONTINUED | OUTPATIENT
Start: 2017-09-05 | End: 2017-09-05

## 2017-09-04 RX ORDER — HEPARIN SODIUM 10000 [USP'U]/100ML
12 INJECTION, SOLUTION INTRAVENOUS CONTINUOUS
Status: DISCONTINUED | OUTPATIENT
Start: 2017-09-04 | End: 2017-09-11 | Stop reason: HOSPADM

## 2017-09-04 RX ORDER — HEPARIN SODIUM 1000 [USP'U]/ML
60 INJECTION, SOLUTION INTRAVENOUS; SUBCUTANEOUS PRN
Status: DISCONTINUED | OUTPATIENT
Start: 2017-09-04 | End: 2017-09-04

## 2017-09-04 RX ORDER — HEPARIN SODIUM 1000 [USP'U]/ML
30 INJECTION, SOLUTION INTRAVENOUS; SUBCUTANEOUS PRN
Status: DISCONTINUED | OUTPATIENT
Start: 2017-09-04 | End: 2017-09-04

## 2017-09-04 RX ADMIN — SALINE NASAL SPRAY 2 DROP: 1.5 SOLUTION NASAL at 19:57

## 2017-09-04 RX ADMIN — HEPARIN SODIUM AND DEXTROSE 12 UNITS/KG/HR: 10000; 5 INJECTION INTRAVENOUS at 20:48

## 2017-09-04 RX ADMIN — NYSTATIN 500000 UNITS: 100000 SUSPENSION ORAL at 22:10

## 2017-09-04 RX ADMIN — LEVETIRACETAM 1500 MG: 500 TABLET, FILM COATED ORAL at 08:51

## 2017-09-04 RX ADMIN — SALINE NASAL SPRAY 2 DROP: 1.5 SOLUTION NASAL at 13:52

## 2017-09-04 RX ADMIN — SODIUM CHLORIDE TAB 1 GM 1 G: 1 TAB at 20:19

## 2017-09-04 RX ADMIN — CARVEDILOL 6.25 MG: 6.25 TABLET, FILM COATED ORAL at 08:51

## 2017-09-04 RX ADMIN — SPIRONOLACTONE 50 MG: 25 TABLET ORAL at 19:58

## 2017-09-04 RX ADMIN — Medication 1 LOZENGE: at 19:58

## 2017-09-04 RX ADMIN — Medication 1 LOZENGE: at 13:53

## 2017-09-04 RX ADMIN — LEVETIRACETAM 1500 MG: 500 TABLET, FILM COATED ORAL at 19:58

## 2017-09-04 RX ADMIN — Medication 10 ML: at 08:51

## 2017-09-04 RX ADMIN — NYSTATIN 500000 UNITS: 100000 SUSPENSION ORAL at 08:50

## 2017-09-04 RX ADMIN — MAGNESIUM GLUCONATE 500 MG ORAL TABLET 400 MG: 500 TABLET ORAL at 08:51

## 2017-09-04 RX ADMIN — SODIUM CHLORIDE TAB 1 GM 1 G: 1 TAB at 13:56

## 2017-09-04 RX ADMIN — ATORVASTATIN CALCIUM 40 MG: 40 TABLET, FILM COATED ORAL at 19:58

## 2017-09-04 RX ADMIN — SODIUM CHLORIDE TAB 1 GM 1 G: 1 TAB at 08:50

## 2017-09-04 RX ADMIN — NYSTATIN 500000 UNITS: 100000 SUSPENSION ORAL at 17:32

## 2017-09-04 RX ADMIN — INSULIN LISPRO 2 UNITS: 100 INJECTION, SOLUTION INTRAVENOUS; SUBCUTANEOUS at 13:46

## 2017-09-04 RX ADMIN — SPIRONOLACTONE 50 MG: 25 TABLET ORAL at 08:52

## 2017-09-04 RX ADMIN — NYSTATIN 500000 UNITS: 100000 SUSPENSION ORAL at 13:57

## 2017-09-04 RX ADMIN — CARVEDILOL 6.25 MG: 6.25 TABLET, FILM COATED ORAL at 17:32

## 2017-09-04 ASSESSMENT — PAIN SCALES - GENERAL: PAINLEVEL_OUTOF10: 0

## 2017-09-05 ENCOUNTER — APPOINTMENT (OUTPATIENT)
Dept: ULTRASOUND IMAGING | Age: 77
DRG: 025 | End: 2017-09-05
Payer: MEDICARE

## 2017-09-05 ENCOUNTER — APPOINTMENT (OUTPATIENT)
Dept: CT IMAGING | Age: 77
DRG: 025 | End: 2017-09-05
Payer: MEDICARE

## 2017-09-05 LAB
ABSOLUTE EOS #: 0.2 K/UL (ref 0–0.4)
ABSOLUTE LYMPH #: 2.3 K/UL (ref 1–4.8)
ABSOLUTE MONO #: 1.4 K/UL (ref 0.1–1.2)
ANION GAP SERPL CALCULATED.3IONS-SCNC: 14 MMOL/L (ref 9–17)
BASOPHILS # BLD: 0 %
BASOPHILS ABSOLUTE: 0 K/UL (ref 0–0.2)
BUN BLDV-MCNC: 11 MG/DL (ref 8–23)
BUN/CREAT BLD: ABNORMAL (ref 9–20)
CALCIUM SERPL-MCNC: 9.2 MG/DL (ref 8.6–10.4)
CHLORIDE BLD-SCNC: 98 MMOL/L (ref 98–107)
CO2: 24 MMOL/L (ref 20–31)
CREAT SERPL-MCNC: 0.58 MG/DL (ref 0.5–0.9)
DIFFERENTIAL TYPE: ABNORMAL
EOSINOPHILS RELATIVE PERCENT: 2 %
GFR AFRICAN AMERICAN: >60 ML/MIN
GFR NON-AFRICAN AMERICAN: >60 ML/MIN
GFR SERPL CREATININE-BSD FRML MDRD: ABNORMAL ML/MIN/{1.73_M2}
GFR SERPL CREATININE-BSD FRML MDRD: ABNORMAL ML/MIN/{1.73_M2}
GLUCOSE BLD-MCNC: 121 MG/DL (ref 65–105)
GLUCOSE BLD-MCNC: 129 MG/DL (ref 70–99)
GLUCOSE BLD-MCNC: 154 MG/DL (ref 65–105)
GLUCOSE BLD-MCNC: 216 MG/DL (ref 65–105)
HCT VFR BLD CALC: 29 % (ref 36–46)
HEMOGLOBIN: 9.5 G/DL (ref 12–16)
INR BLD: 1
LYMPHOCYTES # BLD: 20 %
MCH RBC QN AUTO: 31.2 PG (ref 26–34)
MCHC RBC AUTO-ENTMCNC: 32.6 G/DL (ref 31–37)
MCV RBC AUTO: 95.7 FL (ref 80–100)
MONOCYTES # BLD: 12 %
PARTIAL THROMBOPLASTIN TIME: 30.3 SEC (ref 21.3–31.3)
PARTIAL THROMBOPLASTIN TIME: 35.1 SEC (ref 21.3–31.3)
PARTIAL THROMBOPLASTIN TIME: 35.8 SEC (ref 21.3–31.3)
PDW BLD-RTO: 16.3 % (ref 12.5–15.4)
PLATELET # BLD: 390 K/UL (ref 140–450)
PLATELET ESTIMATE: ABNORMAL
PMV BLD AUTO: 7.4 FL (ref 6–12)
POTASSIUM SERPL-SCNC: 4.1 MMOL/L (ref 3.7–5.3)
PROTHROMBIN TIME: 11.1 SEC (ref 9.4–12.6)
RBC # BLD: 3.03 M/UL (ref 4–5.2)
RBC # BLD: ABNORMAL 10*6/UL
SEG NEUTROPHILS: 66 %
SEGMENTED NEUTROPHILS ABSOLUTE COUNT: 7.5 K/UL (ref 1.8–7.7)
SODIUM BLD-SCNC: 136 MMOL/L (ref 135–144)
WBC # BLD: 11.5 K/UL (ref 3.5–11)
WBC # BLD: ABNORMAL 10*3/UL

## 2017-09-05 PROCEDURE — 36415 COLL VENOUS BLD VENIPUNCTURE: CPT

## 2017-09-05 PROCEDURE — 82947 ASSAY GLUCOSE BLOOD QUANT: CPT

## 2017-09-05 PROCEDURE — 6370000000 HC RX 637 (ALT 250 FOR IP): Performed by: NEUROLOGICAL SURGERY

## 2017-09-05 PROCEDURE — 93971 EXTREMITY STUDY: CPT

## 2017-09-05 PROCEDURE — 85610 PROTHROMBIN TIME: CPT

## 2017-09-05 PROCEDURE — 6370000000 HC RX 637 (ALT 250 FOR IP): Performed by: EMERGENCY MEDICINE

## 2017-09-05 PROCEDURE — 99233 SBSQ HOSP IP/OBS HIGH 50: CPT | Performed by: INTERNAL MEDICINE

## 2017-09-05 PROCEDURE — 6370000000 HC RX 637 (ALT 250 FOR IP): Performed by: INTERNAL MEDICINE

## 2017-09-05 PROCEDURE — 6370000000 HC RX 637 (ALT 250 FOR IP): Performed by: HOSPITALIST

## 2017-09-05 PROCEDURE — 70450 CT HEAD/BRAIN W/O DYE: CPT

## 2017-09-05 PROCEDURE — 80048 BASIC METABOLIC PNL TOTAL CA: CPT

## 2017-09-05 PROCEDURE — 6370000000 HC RX 637 (ALT 250 FOR IP): Performed by: STUDENT IN AN ORGANIZED HEALTH CARE EDUCATION/TRAINING PROGRAM

## 2017-09-05 PROCEDURE — 99211 OFF/OP EST MAY X REQ PHY/QHP: CPT

## 2017-09-05 PROCEDURE — 99232 SBSQ HOSP IP/OBS MODERATE 35: CPT | Performed by: PSYCHIATRY & NEUROLOGY

## 2017-09-05 PROCEDURE — 6360000002 HC RX W HCPCS: Performed by: NURSE PRACTITIONER

## 2017-09-05 PROCEDURE — 85730 THROMBOPLASTIN TIME PARTIAL: CPT

## 2017-09-05 PROCEDURE — 76536 US EXAM OF HEAD AND NECK: CPT

## 2017-09-05 PROCEDURE — 97535 SELF CARE MNGMENT TRAINING: CPT

## 2017-09-05 PROCEDURE — 99232 SBSQ HOSP IP/OBS MODERATE 35: CPT | Performed by: INTERNAL MEDICINE

## 2017-09-05 PROCEDURE — 85025 COMPLETE CBC W/AUTO DIFF WBC: CPT

## 2017-09-05 PROCEDURE — 6370000000 HC RX 637 (ALT 250 FOR IP): Performed by: NURSE PRACTITIONER

## 2017-09-05 PROCEDURE — 2060000000 HC ICU INTERMEDIATE R&B

## 2017-09-05 PROCEDURE — 6370000000 HC RX 637 (ALT 250 FOR IP): Performed by: PSYCHIATRY & NEUROLOGY

## 2017-09-05 PROCEDURE — 6360000002 HC RX W HCPCS: Performed by: INTERNAL MEDICINE

## 2017-09-05 PROCEDURE — 94762 N-INVAS EAR/PLS OXIMTRY CONT: CPT

## 2017-09-05 PROCEDURE — 2580000003 HC RX 258: Performed by: NEUROLOGICAL SURGERY

## 2017-09-05 RX ORDER — BUDESONIDE AND FORMOTEROL FUMARATE DIHYDRATE 160; 4.5 UG/1; UG/1
2 AEROSOL RESPIRATORY (INHALATION) 2 TIMES DAILY
Status: DISCONTINUED | OUTPATIENT
Start: 2017-09-05 | End: 2017-09-11 | Stop reason: HOSPADM

## 2017-09-05 RX ORDER — BUTALBITAL, ACETAMINOPHEN AND CAFFEINE 50; 325; 40 MG/1; MG/1; MG/1
1 TABLET ORAL EVERY 6 HOURS PRN
Status: DISCONTINUED | OUTPATIENT
Start: 2017-09-05 | End: 2017-09-11 | Stop reason: HOSPADM

## 2017-09-05 RX ORDER — WARFARIN SODIUM 2.5 MG/1
2.5 TABLET ORAL DAILY
Status: DISCONTINUED | OUTPATIENT
Start: 2017-09-05 | End: 2017-09-07 | Stop reason: DRUGHIGH

## 2017-09-05 RX ORDER — IBUPROFEN 400 MG/1
600 TABLET ORAL ONCE
Status: COMPLETED | OUTPATIENT
Start: 2017-09-05 | End: 2017-09-05

## 2017-09-05 RX ADMIN — SODIUM CHLORIDE TAB 1 GM 1 G: 1 TAB at 18:01

## 2017-09-05 RX ADMIN — WARFARIN SODIUM 2.5 MG: 2.5 TABLET ORAL at 18:00

## 2017-09-05 RX ADMIN — BUDESONIDE AND FORMOTEROL FUMARATE DIHYDRATE 2 PUFF: 160; 4.5 AEROSOL RESPIRATORY (INHALATION) at 21:33

## 2017-09-05 RX ADMIN — HEPARIN SODIUM AND DEXTROSE 18 UNITS/KG/HR: 10000; 5 INJECTION INTRAVENOUS at 19:12

## 2017-09-05 RX ADMIN — ATORVASTATIN CALCIUM 40 MG: 40 TABLET, FILM COATED ORAL at 21:33

## 2017-09-05 RX ADMIN — BUDESONIDE AND FORMOTEROL FUMARATE DIHYDRATE 2 PUFF: 160; 4.5 AEROSOL RESPIRATORY (INHALATION) at 13:59

## 2017-09-05 RX ADMIN — ACETAMINOPHEN 650 MG: 325 TABLET ORAL at 01:36

## 2017-09-05 RX ADMIN — SPIRONOLACTONE 50 MG: 25 TABLET ORAL at 21:33

## 2017-09-05 RX ADMIN — ACETAMINOPHEN AND CODEINE PHOSPHATE 1 TABLET: 300; 30 TABLET ORAL at 08:15

## 2017-09-05 RX ADMIN — POLYETHYLENE GLYCOL 3350 17 G: 17 POWDER, FOR SOLUTION ORAL at 11:51

## 2017-09-05 RX ADMIN — SALINE NASAL SPRAY 2 DROP: 1.5 SOLUTION NASAL at 21:33

## 2017-09-05 RX ADMIN — BUTALBITAL, ACETAMINOPHEN, AND CAFFEINE 1 TABLET: 50; 325; 40 TABLET ORAL at 18:18

## 2017-09-05 RX ADMIN — FLUTICASONE PROPIONATE 1 SPRAY: 50 SPRAY, METERED NASAL at 11:51

## 2017-09-05 RX ADMIN — CARVEDILOL 6.25 MG: 6.25 TABLET, FILM COATED ORAL at 11:50

## 2017-09-05 RX ADMIN — DOCUSATE SODIUM -SENNOSIDES 2 TABLET: 50; 8.6 TABLET, COATED ORAL at 11:49

## 2017-09-05 RX ADMIN — IBUPROFEN 600 MG: 600 TABLET, FILM COATED ORAL at 21:33

## 2017-09-05 RX ADMIN — LEVETIRACETAM 1500 MG: 500 TABLET, FILM COATED ORAL at 11:50

## 2017-09-05 RX ADMIN — POTASSIUM CHLORIDE 40 MEQ: 40 SOLUTION ORAL at 11:50

## 2017-09-05 RX ADMIN — LEVETIRACETAM 1500 MG: 500 TABLET, FILM COATED ORAL at 21:33

## 2017-09-05 RX ADMIN — NYSTATIN 500000 UNITS: 100000 SUSPENSION ORAL at 11:51

## 2017-09-05 RX ADMIN — SODIUM CHLORIDE TAB 1 GM 1 G: 1 TAB at 11:50

## 2017-09-05 RX ADMIN — SPIRONOLACTONE 50 MG: 25 TABLET ORAL at 11:50

## 2017-09-05 RX ADMIN — Medication 10 ML: at 21:33

## 2017-09-05 RX ADMIN — SALINE NASAL SPRAY 2 DROP: 1.5 SOLUTION NASAL at 11:51

## 2017-09-05 RX ADMIN — ALLOPURINOL 300 MG: 300 TABLET ORAL at 11:49

## 2017-09-05 RX ADMIN — INSULIN LISPRO 2 UNITS: 100 INJECTION, SOLUTION INTRAVENOUS; SUBCUTANEOUS at 12:24

## 2017-09-05 RX ADMIN — NYSTATIN 500000 UNITS: 100000 SUSPENSION ORAL at 18:01

## 2017-09-05 RX ADMIN — NYSTATIN 500000 UNITS: 100000 SUSPENSION ORAL at 21:33

## 2017-09-05 RX ADMIN — MAGNESIUM GLUCONATE 500 MG ORAL TABLET 400 MG: 500 TABLET ORAL at 11:50

## 2017-09-05 RX ADMIN — CARVEDILOL 6.25 MG: 6.25 TABLET, FILM COATED ORAL at 18:00

## 2017-09-05 RX ADMIN — ACETAMINOPHEN AND CODEINE PHOSPHATE 1 TABLET: 300; 30 TABLET ORAL at 14:02

## 2017-09-05 ASSESSMENT — PAIN SCALES - GENERAL
PAINLEVEL_OUTOF10: 6
PAINLEVEL_OUTOF10: 8
PAINLEVEL_OUTOF10: 6
PAINLEVEL_OUTOF10: 0
PAINLEVEL_OUTOF10: 9
PAINLEVEL_OUTOF10: 8
PAINLEVEL_OUTOF10: 3

## 2017-09-05 ASSESSMENT — PAIN DESCRIPTION - LOCATION
LOCATION: HEAD
LOCATION: BACK;LEG

## 2017-09-05 ASSESSMENT — PAIN DESCRIPTION - PAIN TYPE
TYPE: ACUTE PAIN
TYPE: ACUTE PAIN

## 2017-09-05 ASSESSMENT — PAIN DESCRIPTION - FREQUENCY: FREQUENCY: INTERMITTENT

## 2017-09-05 ASSESSMENT — PAIN DESCRIPTION - DESCRIPTORS: DESCRIPTORS: HEADACHE

## 2017-09-05 ASSESSMENT — PAIN DESCRIPTION - ORIENTATION: ORIENTATION: POSTERIOR

## 2017-09-06 ENCOUNTER — APPOINTMENT (OUTPATIENT)
Dept: CT IMAGING | Age: 77
DRG: 025 | End: 2017-09-06
Payer: MEDICARE

## 2017-09-06 PROBLEM — I50.22 SYSTOLIC CHF, CHRONIC (HCC): Status: ACTIVE | Noted: 2017-09-06

## 2017-09-06 LAB
GLUCOSE BLD-MCNC: 118 MG/DL (ref 65–105)
GLUCOSE BLD-MCNC: 152 MG/DL (ref 65–105)
GLUCOSE BLD-MCNC: 165 MG/DL (ref 65–105)
GLUCOSE BLD-MCNC: 189 MG/DL (ref 65–105)
INR BLD: 1.1
PARTIAL THROMBOPLASTIN TIME: 31.6 SEC (ref 21.3–31.3)
PARTIAL THROMBOPLASTIN TIME: 41.6 SEC (ref 21.3–31.3)
PARTIAL THROMBOPLASTIN TIME: 65.7 SEC (ref 21.3–31.3)
PARTIAL THROMBOPLASTIN TIME: 68.4 SEC (ref 21.3–31.3)
PLATELET # BLD: 378 K/UL (ref 140–450)
PROTHROMBIN TIME: 11.4 SEC (ref 9.4–12.6)

## 2017-09-06 PROCEDURE — 6370000000 HC RX 637 (ALT 250 FOR IP): Performed by: STUDENT IN AN ORGANIZED HEALTH CARE EDUCATION/TRAINING PROGRAM

## 2017-09-06 PROCEDURE — 6370000000 HC RX 637 (ALT 250 FOR IP): Performed by: INTERNAL MEDICINE

## 2017-09-06 PROCEDURE — 85730 THROMBOPLASTIN TIME PARTIAL: CPT

## 2017-09-06 PROCEDURE — 85049 AUTOMATED PLATELET COUNT: CPT

## 2017-09-06 PROCEDURE — 6370000000 HC RX 637 (ALT 250 FOR IP): Performed by: HOSPITALIST

## 2017-09-06 PROCEDURE — 6370000000 HC RX 637 (ALT 250 FOR IP): Performed by: PSYCHIATRY & NEUROLOGY

## 2017-09-06 PROCEDURE — 6370000000 HC RX 637 (ALT 250 FOR IP): Performed by: EMERGENCY MEDICINE

## 2017-09-06 PROCEDURE — 2580000003 HC RX 258: Performed by: NEUROLOGICAL SURGERY

## 2017-09-06 PROCEDURE — 6370000000 HC RX 637 (ALT 250 FOR IP): Performed by: NURSE PRACTITIONER

## 2017-09-06 PROCEDURE — 97530 THERAPEUTIC ACTIVITIES: CPT

## 2017-09-06 PROCEDURE — 99232 SBSQ HOSP IP/OBS MODERATE 35: CPT | Performed by: INTERNAL MEDICINE

## 2017-09-06 PROCEDURE — 99232 SBSQ HOSP IP/OBS MODERATE 35: CPT | Performed by: PSYCHIATRY & NEUROLOGY

## 2017-09-06 PROCEDURE — 70450 CT HEAD/BRAIN W/O DYE: CPT

## 2017-09-06 PROCEDURE — 36415 COLL VENOUS BLD VENIPUNCTURE: CPT

## 2017-09-06 PROCEDURE — 99233 SBSQ HOSP IP/OBS HIGH 50: CPT | Performed by: INTERNAL MEDICINE

## 2017-09-06 PROCEDURE — 2060000000 HC ICU INTERMEDIATE R&B

## 2017-09-06 PROCEDURE — 85610 PROTHROMBIN TIME: CPT

## 2017-09-06 PROCEDURE — 6360000002 HC RX W HCPCS: Performed by: INTERNAL MEDICINE

## 2017-09-06 PROCEDURE — 94640 AIRWAY INHALATION TREATMENT: CPT

## 2017-09-06 PROCEDURE — 97110 THERAPEUTIC EXERCISES: CPT

## 2017-09-06 PROCEDURE — 94762 N-INVAS EAR/PLS OXIMTRY CONT: CPT

## 2017-09-06 PROCEDURE — 82947 ASSAY GLUCOSE BLOOD QUANT: CPT

## 2017-09-06 RX ORDER — POTASSIUM CHLORIDE 20 MEQ/1
20 TABLET, EXTENDED RELEASE ORAL 2 TIMES DAILY WITH MEALS
Status: DISCONTINUED | OUTPATIENT
Start: 2017-09-06 | End: 2017-09-08

## 2017-09-06 RX ORDER — IBUPROFEN 400 MG/1
600 TABLET ORAL ONCE
Status: COMPLETED | OUTPATIENT
Start: 2017-09-06 | End: 2017-09-06

## 2017-09-06 RX ADMIN — POTASSIUM CHLORIDE 20 MEQ: 1500 TABLET, EXTENDED RELEASE ORAL at 17:13

## 2017-09-06 RX ADMIN — LEVETIRACETAM 1500 MG: 500 TABLET, FILM COATED ORAL at 07:55

## 2017-09-06 RX ADMIN — NYSTATIN 500000 UNITS: 100000 SUSPENSION ORAL at 12:32

## 2017-09-06 RX ADMIN — SALINE NASAL SPRAY 2 DROP: 1.5 SOLUTION NASAL at 07:57

## 2017-09-06 RX ADMIN — SALINE NASAL SPRAY 2 DROP: 1.5 SOLUTION NASAL at 21:52

## 2017-09-06 RX ADMIN — INSULIN LISPRO 1 UNITS: 100 INJECTION, SOLUTION INTRAVENOUS; SUBCUTANEOUS at 12:33

## 2017-09-06 RX ADMIN — ATORVASTATIN CALCIUM 40 MG: 40 TABLET, FILM COATED ORAL at 23:01

## 2017-09-06 RX ADMIN — FLUTICASONE PROPIONATE 1 SPRAY: 50 SPRAY, METERED NASAL at 07:54

## 2017-09-06 RX ADMIN — SALINE NASAL SPRAY 2 DROP: 1.5 SOLUTION NASAL at 17:14

## 2017-09-06 RX ADMIN — BUDESONIDE AND FORMOTEROL FUMARATE DIHYDRATE 2 PUFF: 160; 4.5 AEROSOL RESPIRATORY (INHALATION) at 09:22

## 2017-09-06 RX ADMIN — POLYETHYLENE GLYCOL 3350 17 G: 17 POWDER, FOR SOLUTION ORAL at 07:56

## 2017-09-06 RX ADMIN — SPIRONOLACTONE 50 MG: 25 TABLET ORAL at 21:52

## 2017-09-06 RX ADMIN — POTASSIUM CHLORIDE 20 MEQ: 1500 TABLET, EXTENDED RELEASE ORAL at 10:22

## 2017-09-06 RX ADMIN — BUTALBITAL, ACETAMINOPHEN, AND CAFFEINE 1 TABLET: 50; 325; 40 TABLET ORAL at 00:15

## 2017-09-06 RX ADMIN — BUTALBITAL, ACETAMINOPHEN, AND CAFFEINE 1 TABLET: 50; 325; 40 TABLET ORAL at 07:52

## 2017-09-06 RX ADMIN — IBUPROFEN 600 MG: 400 TABLET ORAL at 21:52

## 2017-09-06 RX ADMIN — LEVETIRACETAM 1500 MG: 500 TABLET, FILM COATED ORAL at 21:52

## 2017-09-06 RX ADMIN — NYSTATIN 500000 UNITS: 100000 SUSPENSION ORAL at 17:13

## 2017-09-06 RX ADMIN — BUDESONIDE AND FORMOTEROL FUMARATE DIHYDRATE 2 PUFF: 160; 4.5 AEROSOL RESPIRATORY (INHALATION) at 20:20

## 2017-09-06 RX ADMIN — SPIRONOLACTONE 50 MG: 25 TABLET ORAL at 08:31

## 2017-09-06 RX ADMIN — Medication 10 ML: at 21:52

## 2017-09-06 RX ADMIN — BUTALBITAL, ACETAMINOPHEN, AND CAFFEINE 1 TABLET: 50; 325; 40 TABLET ORAL at 14:39

## 2017-09-06 RX ADMIN — NYSTATIN 500000 UNITS: 100000 SUSPENSION ORAL at 07:56

## 2017-09-06 RX ADMIN — Medication 1 LOZENGE: at 21:53

## 2017-09-06 RX ADMIN — SALINE NASAL SPRAY 2 DROP: 1.5 SOLUTION NASAL at 12:32

## 2017-09-06 RX ADMIN — CARVEDILOL 6.25 MG: 6.25 TABLET, FILM COATED ORAL at 07:54

## 2017-09-06 RX ADMIN — WARFARIN SODIUM 2.5 MG: 2.5 TABLET ORAL at 17:14

## 2017-09-06 RX ADMIN — SODIUM CHLORIDE TAB 1 GM 1 G: 1 TAB at 12:33

## 2017-09-06 RX ADMIN — HEPARIN SODIUM AND DEXTROSE 20 UNITS/KG/HR: 10000; 5 INJECTION INTRAVENOUS at 18:40

## 2017-09-06 RX ADMIN — DOCUSATE SODIUM -SENNOSIDES 2 TABLET: 50; 8.6 TABLET, COATED ORAL at 07:57

## 2017-09-06 RX ADMIN — SODIUM CHLORIDE TAB 1 GM 1 G: 1 TAB at 17:14

## 2017-09-06 RX ADMIN — SODIUM CHLORIDE TAB 1 GM 1 G: 1 TAB at 07:59

## 2017-09-06 RX ADMIN — ALLOPURINOL 300 MG: 300 TABLET ORAL at 08:30

## 2017-09-06 RX ADMIN — CARVEDILOL 6.25 MG: 6.25 TABLET, FILM COATED ORAL at 17:13

## 2017-09-06 RX ADMIN — INSULIN LISPRO 1 UNITS: 100 INJECTION, SOLUTION INTRAVENOUS; SUBCUTANEOUS at 17:17

## 2017-09-06 RX ADMIN — MAGNESIUM GLUCONATE 500 MG ORAL TABLET 400 MG: 500 TABLET ORAL at 07:55

## 2017-09-06 ASSESSMENT — PAIN DESCRIPTION - PAIN TYPE: TYPE: ACUTE PAIN

## 2017-09-06 ASSESSMENT — PAIN SCALES - GENERAL
PAINLEVEL_OUTOF10: 8
PAINLEVEL_OUTOF10: 7
PAINLEVEL_OUTOF10: 7
PAINLEVEL_OUTOF10: 6
PAINLEVEL_OUTOF10: 8
PAINLEVEL_OUTOF10: 4

## 2017-09-06 ASSESSMENT — PAIN DESCRIPTION - DESCRIPTORS: DESCRIPTORS: HEADACHE

## 2017-09-07 ENCOUNTER — APPOINTMENT (OUTPATIENT)
Dept: GENERAL RADIOLOGY | Age: 77
DRG: 025 | End: 2017-09-07
Payer: MEDICARE

## 2017-09-07 ENCOUNTER — APPOINTMENT (OUTPATIENT)
Dept: MRI IMAGING | Age: 77
DRG: 025 | End: 2017-09-07
Payer: MEDICARE

## 2017-09-07 PROBLEM — E87.1 HYPONATREMIA: Status: RESOLVED | Noted: 2017-08-21 | Resolved: 2017-09-07

## 2017-09-07 PROBLEM — M51.27 PROTRUSION OF INTERVERTEBRAL DISC OF LUMBOSACRAL REGION: Status: RESOLVED | Noted: 2017-08-17 | Resolved: 2017-09-07

## 2017-09-07 PROBLEM — J18.9 PNEUMONIA DUE TO INFECTIOUS ORGANISM: Status: RESOLVED | Noted: 2017-08-21 | Resolved: 2017-09-07

## 2017-09-07 LAB
GLUCOSE BLD-MCNC: 100 MG/DL (ref 65–105)
GLUCOSE BLD-MCNC: 147 MG/DL (ref 65–105)
GLUCOSE BLD-MCNC: 195 MG/DL (ref 65–105)
INR BLD: 1
PARTIAL THROMBOPLASTIN TIME: 62.4 SEC (ref 21.3–31.3)
PARTIAL THROMBOPLASTIN TIME: 64.7 SEC (ref 21.3–31.3)
PARTIAL THROMBOPLASTIN TIME: 70.8 SEC (ref 21.3–31.3)
PROTHROMBIN TIME: 11.3 SEC (ref 9.4–12.6)

## 2017-09-07 PROCEDURE — 85730 THROMBOPLASTIN TIME PARTIAL: CPT

## 2017-09-07 PROCEDURE — 6370000000 HC RX 637 (ALT 250 FOR IP): Performed by: PSYCHIATRY & NEUROLOGY

## 2017-09-07 PROCEDURE — 6370000000 HC RX 637 (ALT 250 FOR IP): Performed by: INTERNAL MEDICINE

## 2017-09-07 PROCEDURE — 94762 N-INVAS EAR/PLS OXIMTRY CONT: CPT

## 2017-09-07 PROCEDURE — 99232 SBSQ HOSP IP/OBS MODERATE 35: CPT | Performed by: INTERNAL MEDICINE

## 2017-09-07 PROCEDURE — 6370000000 HC RX 637 (ALT 250 FOR IP): Performed by: STUDENT IN AN ORGANIZED HEALTH CARE EDUCATION/TRAINING PROGRAM

## 2017-09-07 PROCEDURE — 6370000000 HC RX 637 (ALT 250 FOR IP): Performed by: EMERGENCY MEDICINE

## 2017-09-07 PROCEDURE — 99232 SBSQ HOSP IP/OBS MODERATE 35: CPT | Performed by: PSYCHIATRY & NEUROLOGY

## 2017-09-07 PROCEDURE — 36415 COLL VENOUS BLD VENIPUNCTURE: CPT

## 2017-09-07 PROCEDURE — 6370000000 HC RX 637 (ALT 250 FOR IP): Performed by: HOSPITALIST

## 2017-09-07 PROCEDURE — 74000 XR ABDOMEN LIMITED (KUB): CPT

## 2017-09-07 PROCEDURE — 94640 AIRWAY INHALATION TREATMENT: CPT

## 2017-09-07 PROCEDURE — 82947 ASSAY GLUCOSE BLOOD QUANT: CPT

## 2017-09-07 PROCEDURE — 6360000002 HC RX W HCPCS: Performed by: INTERNAL MEDICINE

## 2017-09-07 PROCEDURE — 6370000000 HC RX 637 (ALT 250 FOR IP): Performed by: NURSE PRACTITIONER

## 2017-09-07 PROCEDURE — 2060000000 HC ICU INTERMEDIATE R&B

## 2017-09-07 PROCEDURE — 99233 SBSQ HOSP IP/OBS HIGH 50: CPT | Performed by: INTERNAL MEDICINE

## 2017-09-07 PROCEDURE — 2580000003 HC RX 258: Performed by: NEUROLOGICAL SURGERY

## 2017-09-07 PROCEDURE — 85610 PROTHROMBIN TIME: CPT

## 2017-09-07 RX ORDER — WARFARIN SODIUM 2 MG/1
4 TABLET ORAL DAILY
Status: DISCONTINUED | OUTPATIENT
Start: 2017-09-07 | End: 2017-09-11 | Stop reason: HOSPADM

## 2017-09-07 RX ORDER — IBUPROFEN 600 MG/1
600 TABLET ORAL EVERY 6 HOURS PRN
Status: DISCONTINUED | OUTPATIENT
Start: 2017-09-07 | End: 2017-09-11 | Stop reason: HOSPADM

## 2017-09-07 RX ORDER — LORAZEPAM 2 MG/ML
2 INJECTION INTRAMUSCULAR ONCE
Status: DISCONTINUED | OUTPATIENT
Start: 2017-09-07 | End: 2017-09-11 | Stop reason: HOSPADM

## 2017-09-07 RX ORDER — LORAZEPAM 2 MG/ML
1 INJECTION INTRAMUSCULAR ONCE
Status: COMPLETED | OUTPATIENT
Start: 2017-09-07 | End: 2017-09-07

## 2017-09-07 RX ADMIN — IBUPROFEN 600 MG: 400 TABLET ORAL at 19:44

## 2017-09-07 RX ADMIN — FLUTICASONE PROPIONATE 1 SPRAY: 50 SPRAY, METERED NASAL at 08:23

## 2017-09-07 RX ADMIN — SALINE NASAL SPRAY 2 DROP: 1.5 SOLUTION NASAL at 17:24

## 2017-09-07 RX ADMIN — SPIRONOLACTONE 50 MG: 25 TABLET ORAL at 21:00

## 2017-09-07 RX ADMIN — BUTALBITAL, ACETAMINOPHEN, AND CAFFEINE 1 TABLET: 50; 325; 40 TABLET ORAL at 20:56

## 2017-09-07 RX ADMIN — SALINE NASAL SPRAY 2 DROP: 1.5 SOLUTION NASAL at 08:22

## 2017-09-07 RX ADMIN — SPIRONOLACTONE 50 MG: 25 TABLET ORAL at 08:20

## 2017-09-07 RX ADMIN — SODIUM CHLORIDE TAB 1 GM 1 G: 1 TAB at 08:20

## 2017-09-07 RX ADMIN — LEVETIRACETAM 1500 MG: 500 TABLET, FILM COATED ORAL at 08:19

## 2017-09-07 RX ADMIN — ATORVASTATIN CALCIUM 40 MG: 40 TABLET, FILM COATED ORAL at 20:58

## 2017-09-07 RX ADMIN — CARVEDILOL 6.25 MG: 6.25 TABLET, FILM COATED ORAL at 08:20

## 2017-09-07 RX ADMIN — POTASSIUM CHLORIDE 20 MEQ: 1500 TABLET, EXTENDED RELEASE ORAL at 17:24

## 2017-09-07 RX ADMIN — POTASSIUM CHLORIDE 20 MEQ: 1500 TABLET, EXTENDED RELEASE ORAL at 08:19

## 2017-09-07 RX ADMIN — BUDESONIDE AND FORMOTEROL FUMARATE DIHYDRATE 2 PUFF: 160; 4.5 AEROSOL RESPIRATORY (INHALATION) at 21:34

## 2017-09-07 RX ADMIN — BUDESONIDE AND FORMOTEROL FUMARATE DIHYDRATE 2 PUFF: 160; 4.5 AEROSOL RESPIRATORY (INHALATION) at 08:46

## 2017-09-07 RX ADMIN — LORAZEPAM 1 MG: 2 INJECTION INTRAMUSCULAR; INTRAVENOUS at 11:19

## 2017-09-07 RX ADMIN — Medication 10 ML: at 08:23

## 2017-09-07 RX ADMIN — SALINE NASAL SPRAY 2 DROP: 1.5 SOLUTION NASAL at 21:00

## 2017-09-07 RX ADMIN — LEVETIRACETAM 1500 MG: 500 TABLET, FILM COATED ORAL at 20:58

## 2017-09-07 RX ADMIN — MAGNESIUM GLUCONATE 500 MG ORAL TABLET 400 MG: 500 TABLET ORAL at 08:19

## 2017-09-07 RX ADMIN — ACETAMINOPHEN AND CODEINE PHOSPHATE 1 TABLET: 300; 30 TABLET ORAL at 20:57

## 2017-09-07 RX ADMIN — CARVEDILOL 6.25 MG: 6.25 TABLET, FILM COATED ORAL at 17:24

## 2017-09-07 RX ADMIN — ALLOPURINOL 300 MG: 300 TABLET ORAL at 08:20

## 2017-09-07 RX ADMIN — WARFARIN SODIUM 4 MG: 2 TABLET ORAL at 17:24

## 2017-09-07 ASSESSMENT — PAIN SCALES - GENERAL
PAINLEVEL_OUTOF10: 3
PAINLEVEL_OUTOF10: 6
PAINLEVEL_OUTOF10: 0
PAINLEVEL_OUTOF10: 3
PAINLEVEL_OUTOF10: 0
PAINLEVEL_OUTOF10: 3
PAINLEVEL_OUTOF10: 8

## 2017-09-07 ASSESSMENT — PAIN DESCRIPTION - PAIN TYPE
TYPE: ACUTE PAIN

## 2017-09-07 ASSESSMENT — PAIN DESCRIPTION - LOCATION
LOCATION: NECK

## 2017-09-08 LAB
BUN BLDV-MCNC: 11 MG/DL (ref 8–23)
CREAT SERPL-MCNC: 0.76 MG/DL (ref 0.5–0.9)
GFR AFRICAN AMERICAN: >60 ML/MIN
GFR NON-AFRICAN AMERICAN: >60 ML/MIN
GFR SERPL CREATININE-BSD FRML MDRD: NORMAL ML/MIN/{1.73_M2}
GFR SERPL CREATININE-BSD FRML MDRD: NORMAL ML/MIN/{1.73_M2}
GLUCOSE BLD-MCNC: 120 MG/DL (ref 65–105)
GLUCOSE BLD-MCNC: 125 MG/DL (ref 65–105)
GLUCOSE BLD-MCNC: 168 MG/DL (ref 65–105)
INR BLD: 1.1
PARTIAL THROMBOPLASTIN TIME: 51 SEC (ref 21.3–31.3)
PARTIAL THROMBOPLASTIN TIME: 57.1 SEC (ref 21.3–31.3)
PARTIAL THROMBOPLASTIN TIME: 94 SEC (ref 21.3–31.3)
PLATELET # BLD: 347 K/UL (ref 140–450)
PROTHROMBIN TIME: 11.6 SEC (ref 9.4–12.6)

## 2017-09-08 PROCEDURE — 6370000000 HC RX 637 (ALT 250 FOR IP): Performed by: NURSE PRACTITIONER

## 2017-09-08 PROCEDURE — 36415 COLL VENOUS BLD VENIPUNCTURE: CPT

## 2017-09-08 PROCEDURE — 6370000000 HC RX 637 (ALT 250 FOR IP): Performed by: INTERNAL MEDICINE

## 2017-09-08 PROCEDURE — 97110 THERAPEUTIC EXERCISES: CPT

## 2017-09-08 PROCEDURE — 85049 AUTOMATED PLATELET COUNT: CPT

## 2017-09-08 PROCEDURE — 6370000000 HC RX 637 (ALT 250 FOR IP): Performed by: HOSPITALIST

## 2017-09-08 PROCEDURE — 94640 AIRWAY INHALATION TREATMENT: CPT

## 2017-09-08 PROCEDURE — 6370000000 HC RX 637 (ALT 250 FOR IP): Performed by: EMERGENCY MEDICINE

## 2017-09-08 PROCEDURE — 6370000000 HC RX 637 (ALT 250 FOR IP): Performed by: STUDENT IN AN ORGANIZED HEALTH CARE EDUCATION/TRAINING PROGRAM

## 2017-09-08 PROCEDURE — 99232 SBSQ HOSP IP/OBS MODERATE 35: CPT | Performed by: PSYCHIATRY & NEUROLOGY

## 2017-09-08 PROCEDURE — 99232 SBSQ HOSP IP/OBS MODERATE 35: CPT | Performed by: INTERNAL MEDICINE

## 2017-09-08 PROCEDURE — 6360000002 HC RX W HCPCS: Performed by: INTERNAL MEDICINE

## 2017-09-08 PROCEDURE — 94762 N-INVAS EAR/PLS OXIMTRY CONT: CPT

## 2017-09-08 PROCEDURE — 97116 GAIT TRAINING THERAPY: CPT

## 2017-09-08 PROCEDURE — 84520 ASSAY OF UREA NITROGEN: CPT

## 2017-09-08 PROCEDURE — 97530 THERAPEUTIC ACTIVITIES: CPT

## 2017-09-08 PROCEDURE — 85730 THROMBOPLASTIN TIME PARTIAL: CPT

## 2017-09-08 PROCEDURE — 82565 ASSAY OF CREATININE: CPT

## 2017-09-08 PROCEDURE — 85610 PROTHROMBIN TIME: CPT

## 2017-09-08 PROCEDURE — 97535 SELF CARE MNGMENT TRAINING: CPT

## 2017-09-08 PROCEDURE — 82947 ASSAY GLUCOSE BLOOD QUANT: CPT

## 2017-09-08 PROCEDURE — 2060000000 HC ICU INTERMEDIATE R&B

## 2017-09-08 RX ORDER — LORAZEPAM 2 MG/ML
1 INJECTION INTRAMUSCULAR EVERY 30 MIN PRN
Status: DISCONTINUED | OUTPATIENT
Start: 2017-09-08 | End: 2017-09-08 | Stop reason: CLARIF

## 2017-09-08 RX ORDER — LORAZEPAM 2 MG/ML
1 INJECTION INTRAMUSCULAR ONCE
Status: DISCONTINUED | OUTPATIENT
Start: 2017-09-08 | End: 2017-09-11 | Stop reason: HOSPADM

## 2017-09-08 RX ORDER — METFORMIN HYDROCHLORIDE 500 MG/1
1000 TABLET, EXTENDED RELEASE ORAL 2 TIMES DAILY WITH MEALS
Status: DISCONTINUED | OUTPATIENT
Start: 2017-09-08 | End: 2017-09-09

## 2017-09-08 RX ADMIN — IBUPROFEN 600 MG: 400 TABLET ORAL at 12:48

## 2017-09-08 RX ADMIN — SPIRONOLACTONE 50 MG: 25 TABLET ORAL at 22:03

## 2017-09-08 RX ADMIN — HEPARIN SODIUM AND DEXTROSE 19 UNITS/KG/HR: 10000; 5 INJECTION INTRAVENOUS at 07:49

## 2017-09-08 RX ADMIN — LEVETIRACETAM 1500 MG: 500 TABLET, FILM COATED ORAL at 22:03

## 2017-09-08 RX ADMIN — METFORMIN HYDROCHLORIDE 1000 MG: 500 TABLET, EXTENDED RELEASE ORAL at 18:26

## 2017-09-08 RX ADMIN — POLYETHYLENE GLYCOL 3350 17 G: 17 POWDER, FOR SOLUTION ORAL at 08:32

## 2017-09-08 RX ADMIN — ACETAMINOPHEN AND CODEINE PHOSPHATE 1 TABLET: 300; 30 TABLET ORAL at 14:03

## 2017-09-08 RX ADMIN — LEVETIRACETAM 1500 MG: 500 TABLET, FILM COATED ORAL at 08:32

## 2017-09-08 RX ADMIN — BUDESONIDE AND FORMOTEROL FUMARATE DIHYDRATE 2 PUFF: 160; 4.5 AEROSOL RESPIRATORY (INHALATION) at 09:45

## 2017-09-08 RX ADMIN — POTASSIUM CHLORIDE 20 MEQ: 1500 TABLET, EXTENDED RELEASE ORAL at 08:31

## 2017-09-08 RX ADMIN — HEPARIN SODIUM AND DEXTROSE 19 UNITS/KG/HR: 10000; 5 INJECTION INTRAVENOUS at 22:47

## 2017-09-08 RX ADMIN — BUDESONIDE AND FORMOTEROL FUMARATE DIHYDRATE 2 PUFF: 160; 4.5 AEROSOL RESPIRATORY (INHALATION) at 21:32

## 2017-09-08 RX ADMIN — WARFARIN SODIUM 4 MG: 2 TABLET ORAL at 18:20

## 2017-09-08 RX ADMIN — CARVEDILOL 6.25 MG: 6.25 TABLET, FILM COATED ORAL at 18:20

## 2017-09-08 RX ADMIN — INSULIN LISPRO 1 UNITS: 100 INJECTION, SOLUTION INTRAVENOUS; SUBCUTANEOUS at 18:20

## 2017-09-08 RX ADMIN — ALLOPURINOL 300 MG: 300 TABLET ORAL at 08:32

## 2017-09-08 RX ADMIN — SALINE NASAL SPRAY 2 DROP: 1.5 SOLUTION NASAL at 13:15

## 2017-09-08 RX ADMIN — SPIRONOLACTONE 50 MG: 25 TABLET ORAL at 08:31

## 2017-09-08 RX ADMIN — SALINE NASAL SPRAY 2 DROP: 1.5 SOLUTION NASAL at 08:33

## 2017-09-08 RX ADMIN — CARVEDILOL 6.25 MG: 6.25 TABLET, FILM COATED ORAL at 08:31

## 2017-09-08 RX ADMIN — DOCUSATE SODIUM -SENNOSIDES 2 TABLET: 50; 8.6 TABLET, COATED ORAL at 08:31

## 2017-09-08 RX ADMIN — FLUTICASONE PROPIONATE 1 SPRAY: 50 SPRAY, METERED NASAL at 08:33

## 2017-09-08 RX ADMIN — ACETAMINOPHEN AND CODEINE PHOSPHATE 1 TABLET: 300; 30 TABLET ORAL at 07:51

## 2017-09-08 RX ADMIN — HEPARIN SODIUM AND DEXTROSE 22 UNITS/KG/HR: 10000; 5 INJECTION INTRAVENOUS at 03:30

## 2017-09-08 ASSESSMENT — PAIN SCALES - GENERAL
PAINLEVEL_OUTOF10: 8
PAINLEVEL_OUTOF10: 2
PAINLEVEL_OUTOF10: 4
PAINLEVEL_OUTOF10: 6

## 2017-09-08 ASSESSMENT — PAIN DESCRIPTION - LOCATION: LOCATION: HEAD

## 2017-09-08 ASSESSMENT — PAIN DESCRIPTION - DESCRIPTORS: DESCRIPTORS: HEADACHE

## 2017-09-08 ASSESSMENT — PAIN DESCRIPTION - FREQUENCY: FREQUENCY: INTERMITTENT

## 2017-09-09 LAB
GLUCOSE BLD-MCNC: 114 MG/DL (ref 65–105)
INR BLD: 1.4
PARTIAL THROMBOPLASTIN TIME: 56.7 SEC (ref 21.3–31.3)
PARTIAL THROMBOPLASTIN TIME: 59.6 SEC (ref 21.3–31.3)
PARTIAL THROMBOPLASTIN TIME: 62.1 SEC (ref 21.3–31.3)
PROTHROMBIN TIME: 14.9 SEC (ref 9.4–12.6)

## 2017-09-09 PROCEDURE — 6370000000 HC RX 637 (ALT 250 FOR IP): Performed by: HOSPITALIST

## 2017-09-09 PROCEDURE — 94640 AIRWAY INHALATION TREATMENT: CPT

## 2017-09-09 PROCEDURE — 36415 COLL VENOUS BLD VENIPUNCTURE: CPT

## 2017-09-09 PROCEDURE — 85730 THROMBOPLASTIN TIME PARTIAL: CPT

## 2017-09-09 PROCEDURE — 2060000000 HC ICU INTERMEDIATE R&B

## 2017-09-09 PROCEDURE — 6370000000 HC RX 637 (ALT 250 FOR IP): Performed by: STUDENT IN AN ORGANIZED HEALTH CARE EDUCATION/TRAINING PROGRAM

## 2017-09-09 PROCEDURE — 6370000000 HC RX 637 (ALT 250 FOR IP): Performed by: NURSE PRACTITIONER

## 2017-09-09 PROCEDURE — 82947 ASSAY GLUCOSE BLOOD QUANT: CPT

## 2017-09-09 PROCEDURE — 2580000003 HC RX 258: Performed by: NEUROLOGICAL SURGERY

## 2017-09-09 PROCEDURE — 99232 SBSQ HOSP IP/OBS MODERATE 35: CPT | Performed by: INTERNAL MEDICINE

## 2017-09-09 PROCEDURE — 85610 PROTHROMBIN TIME: CPT

## 2017-09-09 PROCEDURE — 99232 SBSQ HOSP IP/OBS MODERATE 35: CPT | Performed by: PSYCHIATRY & NEUROLOGY

## 2017-09-09 PROCEDURE — 6370000000 HC RX 637 (ALT 250 FOR IP): Performed by: PSYCHIATRY & NEUROLOGY

## 2017-09-09 PROCEDURE — 6360000002 HC RX W HCPCS: Performed by: INTERNAL MEDICINE

## 2017-09-09 PROCEDURE — 94762 N-INVAS EAR/PLS OXIMTRY CONT: CPT

## 2017-09-09 RX ORDER — FLUTICASONE PROPIONATE 50 MCG
1 SPRAY, SUSPENSION (ML) NASAL DAILY
Status: DISCONTINUED | OUTPATIENT
Start: 2017-09-09 | End: 2017-09-09 | Stop reason: SDUPTHER

## 2017-09-09 RX ORDER — FLUTICASONE PROPIONATE 50 MCG
1 SPRAY, SUSPENSION (ML) NASAL DAILY
Status: DISCONTINUED | OUTPATIENT
Start: 2017-09-10 | End: 2017-09-11 | Stop reason: HOSPADM

## 2017-09-09 RX ADMIN — BUTALBITAL, ACETAMINOPHEN, AND CAFFEINE 1 TABLET: 50; 325; 40 TABLET ORAL at 03:48

## 2017-09-09 RX ADMIN — SPIRONOLACTONE 50 MG: 25 TABLET ORAL at 20:41

## 2017-09-09 RX ADMIN — FLUTICASONE PROPIONATE 1 SPRAY: 50 SPRAY, METERED NASAL at 08:53

## 2017-09-09 RX ADMIN — HEPARIN SODIUM AND DEXTROSE 19 UNITS/KG/HR: 10000; 5 INJECTION INTRAVENOUS at 19:08

## 2017-09-09 RX ADMIN — CARVEDILOL 6.25 MG: 6.25 TABLET, FILM COATED ORAL at 19:01

## 2017-09-09 RX ADMIN — LEVETIRACETAM 1500 MG: 500 TABLET, FILM COATED ORAL at 20:41

## 2017-09-09 RX ADMIN — WARFARIN SODIUM 4 MG: 2 TABLET ORAL at 19:00

## 2017-09-09 RX ADMIN — SALINE NASAL SPRAY 2 DROP: 1.5 SOLUTION NASAL at 08:53

## 2017-09-09 RX ADMIN — SALINE NASAL SPRAY 2 DROP: 1.5 SOLUTION NASAL at 20:42

## 2017-09-09 RX ADMIN — BUTALBITAL, ACETAMINOPHEN, AND CAFFEINE 1 TABLET: 50; 325; 40 TABLET ORAL at 16:28

## 2017-09-09 RX ADMIN — METFORMIN HYDROCHLORIDE 1000 MG: 500 TABLET, EXTENDED RELEASE ORAL at 08:52

## 2017-09-09 RX ADMIN — ACETAMINOPHEN AND CODEINE PHOSPHATE 1 TABLET: 300; 30 TABLET ORAL at 01:15

## 2017-09-09 RX ADMIN — Medication 10 ML: at 20:47

## 2017-09-09 RX ADMIN — LEVETIRACETAM 1500 MG: 500 TABLET, FILM COATED ORAL at 08:50

## 2017-09-09 RX ADMIN — BUDESONIDE AND FORMOTEROL FUMARATE DIHYDRATE 2 PUFF: 160; 4.5 AEROSOL RESPIRATORY (INHALATION) at 08:26

## 2017-09-09 RX ADMIN — METFORMIN HYDROCHLORIDE 1000 MG: 500 TABLET ORAL at 20:41

## 2017-09-09 RX ADMIN — BUDESONIDE AND FORMOTEROL FUMARATE DIHYDRATE 2 PUFF: 160; 4.5 AEROSOL RESPIRATORY (INHALATION) at 21:23

## 2017-09-09 RX ADMIN — ALLOPURINOL 300 MG: 300 TABLET ORAL at 09:00

## 2017-09-09 RX ADMIN — SPIRONOLACTONE 50 MG: 25 TABLET ORAL at 08:52

## 2017-09-09 RX ADMIN — ACETAMINOPHEN AND CODEINE PHOSPHATE 1 TABLET: 300; 30 TABLET ORAL at 19:00

## 2017-09-09 ASSESSMENT — PAIN SCALES - GENERAL
PAINLEVEL_OUTOF10: 7
PAINLEVEL_OUTOF10: 4
PAINLEVEL_OUTOF10: 8
PAINLEVEL_OUTOF10: 0
PAINLEVEL_OUTOF10: 6
PAINLEVEL_OUTOF10: 0
PAINLEVEL_OUTOF10: 0

## 2017-09-10 LAB
-: ABNORMAL
AMORPHOUS: ABNORMAL
BACTERIA: ABNORMAL
BILIRUBIN URINE: NEGATIVE
CASTS UA: ABNORMAL /LPF (ref 0–8)
COLOR: YELLOW
COMMENT UA: ABNORMAL
CRYSTALS, UA: ABNORMAL /HPF
EPITHELIAL CELLS UA: ABNORMAL /HPF (ref 0–5)
GLUCOSE URINE: NEGATIVE
INR BLD: 1.7
KETONES, URINE: NEGATIVE
LEUKOCYTE ESTERASE, URINE: ABNORMAL
MUCUS: ABNORMAL
NITRITE, URINE: NEGATIVE
OTHER OBSERVATIONS UA: ABNORMAL
PARTIAL THROMBOPLASTIN TIME: 56.1 SEC (ref 21.3–31.3)
PARTIAL THROMBOPLASTIN TIME: 56.4 SEC (ref 21.3–31.3)
PARTIAL THROMBOPLASTIN TIME: 65.9 SEC (ref 21.3–31.3)
PARTIAL THROMBOPLASTIN TIME: 75.9 SEC (ref 21.3–31.3)
PH UA: 6.5 (ref 5–8)
PLATELET # BLD: 300 K/UL (ref 140–450)
PROTEIN UA: ABNORMAL
PROTHROMBIN TIME: 18.4 SEC (ref 9.4–12.6)
RBC UA: ABNORMAL /HPF (ref 0–4)
RENAL EPITHELIAL, UA: ABNORMAL /HPF
SPECIFIC GRAVITY UA: 1.01 (ref 1–1.03)
TRICHOMONAS: ABNORMAL
TURBIDITY: ABNORMAL
URINE HGB: ABNORMAL
UROBILINOGEN, URINE: NORMAL
WBC UA: ABNORMAL /HPF (ref 0–5)
YEAST: ABNORMAL

## 2017-09-10 PROCEDURE — 94762 N-INVAS EAR/PLS OXIMTRY CONT: CPT

## 2017-09-10 PROCEDURE — 2580000003 HC RX 258: Performed by: NEUROLOGICAL SURGERY

## 2017-09-10 PROCEDURE — 94640 AIRWAY INHALATION TREATMENT: CPT

## 2017-09-10 PROCEDURE — 99024 POSTOP FOLLOW-UP VISIT: CPT | Performed by: NEUROLOGICAL SURGERY

## 2017-09-10 PROCEDURE — 6370000000 HC RX 637 (ALT 250 FOR IP): Performed by: STUDENT IN AN ORGANIZED HEALTH CARE EDUCATION/TRAINING PROGRAM

## 2017-09-10 PROCEDURE — 85610 PROTHROMBIN TIME: CPT

## 2017-09-10 PROCEDURE — 36415 COLL VENOUS BLD VENIPUNCTURE: CPT

## 2017-09-10 PROCEDURE — 87086 URINE CULTURE/COLONY COUNT: CPT

## 2017-09-10 PROCEDURE — 85049 AUTOMATED PLATELET COUNT: CPT

## 2017-09-10 PROCEDURE — 6360000002 HC RX W HCPCS: Performed by: INTERNAL MEDICINE

## 2017-09-10 PROCEDURE — 2060000000 HC ICU INTERMEDIATE R&B

## 2017-09-10 PROCEDURE — 6370000000 HC RX 637 (ALT 250 FOR IP): Performed by: NURSE PRACTITIONER

## 2017-09-10 PROCEDURE — 99233 SBSQ HOSP IP/OBS HIGH 50: CPT | Performed by: INTERNAL MEDICINE

## 2017-09-10 PROCEDURE — 99232 SBSQ HOSP IP/OBS MODERATE 35: CPT | Performed by: INTERNAL MEDICINE

## 2017-09-10 PROCEDURE — 99232 SBSQ HOSP IP/OBS MODERATE 35: CPT | Performed by: PSYCHIATRY & NEUROLOGY

## 2017-09-10 PROCEDURE — 85730 THROMBOPLASTIN TIME PARTIAL: CPT

## 2017-09-10 PROCEDURE — 81001 URINALYSIS AUTO W/SCOPE: CPT

## 2017-09-10 PROCEDURE — 87077 CULTURE AEROBIC IDENTIFY: CPT

## 2017-09-10 PROCEDURE — 87186 SC STD MICRODIL/AGAR DIL: CPT

## 2017-09-10 PROCEDURE — 6370000000 HC RX 637 (ALT 250 FOR IP): Performed by: HOSPITALIST

## 2017-09-10 RX ADMIN — METFORMIN HYDROCHLORIDE 1000 MG: 500 TABLET ORAL at 09:31

## 2017-09-10 RX ADMIN — WARFARIN SODIUM 4 MG: 2 TABLET ORAL at 18:49

## 2017-09-10 RX ADMIN — SPIRONOLACTONE 50 MG: 25 TABLET ORAL at 09:31

## 2017-09-10 RX ADMIN — MAGNESIUM HYDROXIDE 30 ML: 400 SUSPENSION ORAL at 20:50

## 2017-09-10 RX ADMIN — SPIRONOLACTONE 50 MG: 25 TABLET ORAL at 20:26

## 2017-09-10 RX ADMIN — Medication 10 ML: at 20:28

## 2017-09-10 RX ADMIN — BUDESONIDE AND FORMOTEROL FUMARATE DIHYDRATE 2 PUFF: 160; 4.5 AEROSOL RESPIRATORY (INHALATION) at 20:26

## 2017-09-10 RX ADMIN — SALINE NASAL SPRAY 2 DROP: 1.5 SOLUTION NASAL at 16:24

## 2017-09-10 RX ADMIN — METFORMIN HYDROCHLORIDE 1000 MG: 500 TABLET ORAL at 18:53

## 2017-09-10 RX ADMIN — HEPARIN SODIUM AND DEXTROSE 17.01 UNITS/KG/HR: 10000; 5 INJECTION INTRAVENOUS at 17:10

## 2017-09-10 RX ADMIN — LEVETIRACETAM 1500 MG: 500 TABLET, FILM COATED ORAL at 20:26

## 2017-09-10 RX ADMIN — SALINE NASAL SPRAY 2 DROP: 1.5 SOLUTION NASAL at 20:25

## 2017-09-10 RX ADMIN — LEVETIRACETAM 1500 MG: 500 TABLET, FILM COATED ORAL at 09:31

## 2017-09-10 RX ADMIN — CARVEDILOL 6.25 MG: 6.25 TABLET, FILM COATED ORAL at 16:24

## 2017-09-10 RX ADMIN — CARVEDILOL 6.25 MG: 6.25 TABLET, FILM COATED ORAL at 09:31

## 2017-09-10 RX ADMIN — ALLOPURINOL 300 MG: 300 TABLET ORAL at 09:32

## 2017-09-10 RX ADMIN — ACETAMINOPHEN AND CODEINE PHOSPHATE 1 TABLET: 300; 30 TABLET ORAL at 03:18

## 2017-09-10 RX ADMIN — BUDESONIDE AND FORMOTEROL FUMARATE DIHYDRATE 2 PUFF: 160; 4.5 AEROSOL RESPIRATORY (INHALATION) at 10:25

## 2017-09-10 RX ADMIN — ACETAMINOPHEN AND CODEINE PHOSPHATE 1 TABLET: 300; 30 TABLET ORAL at 21:17

## 2017-09-10 RX ADMIN — FLUTICASONE PROPIONATE 1 SPRAY: 50 SPRAY, METERED NASAL at 09:32

## 2017-09-10 ASSESSMENT — PAIN SCALES - GENERAL
PAINLEVEL_OUTOF10: 0
PAINLEVEL_OUTOF10: 7
PAINLEVEL_OUTOF10: 4
PAINLEVEL_OUTOF10: 7

## 2017-09-10 ASSESSMENT — PAIN DESCRIPTION - PAIN TYPE: TYPE: ACUTE PAIN

## 2017-09-10 ASSESSMENT — PAIN DESCRIPTION - LOCATION: LOCATION: NECK;BACK

## 2017-09-10 ASSESSMENT — PAIN DESCRIPTION - ONSET: ONSET: PROGRESSIVE

## 2017-09-10 ASSESSMENT — PAIN DESCRIPTION - PROGRESSION: CLINICAL_PROGRESSION: GRADUALLY WORSENING

## 2017-09-10 ASSESSMENT — PAIN DESCRIPTION - DESCRIPTORS: DESCRIPTORS: ACHING;DISCOMFORT

## 2017-09-10 ASSESSMENT — PAIN DESCRIPTION - ORIENTATION: ORIENTATION: LEFT;LOWER

## 2017-09-10 ASSESSMENT — PAIN DESCRIPTION - FREQUENCY: FREQUENCY: INTERMITTENT

## 2017-09-11 VITALS
WEIGHT: 150.35 LBS | OXYGEN SATURATION: 96 % | RESPIRATION RATE: 18 BRPM | HEART RATE: 57 BPM | TEMPERATURE: 96.8 F | BODY MASS INDEX: 25.05 KG/M2 | HEIGHT: 65 IN | DIASTOLIC BLOOD PRESSURE: 61 MMHG | SYSTOLIC BLOOD PRESSURE: 121 MMHG

## 2017-09-11 LAB
ANION GAP SERPL CALCULATED.3IONS-SCNC: 15 MMOL/L (ref 9–17)
BUN BLDV-MCNC: 11 MG/DL (ref 8–23)
BUN/CREAT BLD: ABNORMAL (ref 9–20)
CALCIUM SERPL-MCNC: 9.8 MG/DL (ref 8.6–10.4)
CHLORIDE BLD-SCNC: 103 MMOL/L (ref 98–107)
CO2: 24 MMOL/L (ref 20–31)
CREAT SERPL-MCNC: 0.68 MG/DL (ref 0.5–0.9)
GFR AFRICAN AMERICAN: >60 ML/MIN
GFR NON-AFRICAN AMERICAN: >60 ML/MIN
GFR SERPL CREATININE-BSD FRML MDRD: ABNORMAL ML/MIN/{1.73_M2}
GFR SERPL CREATININE-BSD FRML MDRD: ABNORMAL ML/MIN/{1.73_M2}
GLUCOSE BLD-MCNC: 106 MG/DL (ref 70–99)
HCT VFR BLD CALC: 28.5 % (ref 36–46)
HEMOGLOBIN: 9.1 G/DL (ref 12–16)
INR BLD: 2
MCH RBC QN AUTO: 30.7 PG (ref 26–34)
MCHC RBC AUTO-ENTMCNC: 32.1 G/DL (ref 31–37)
MCV RBC AUTO: 95.5 FL (ref 80–100)
PARTIAL THROMBOPLASTIN TIME: 66.2 SEC (ref 21.3–31.3)
PDW BLD-RTO: 16.3 % (ref 12.5–15.4)
PLATELET # BLD: 277 K/UL (ref 140–450)
PMV BLD AUTO: 7.9 FL (ref 6–12)
POTASSIUM SERPL-SCNC: 4.2 MMOL/L (ref 3.7–5.3)
PROTHROMBIN TIME: 21.6 SEC (ref 9.4–12.6)
RBC # BLD: 2.98 M/UL (ref 4–5.2)
SODIUM BLD-SCNC: 142 MMOL/L (ref 135–144)
WBC # BLD: 9.9 K/UL (ref 3.5–11)

## 2017-09-11 PROCEDURE — 6370000000 HC RX 637 (ALT 250 FOR IP): Performed by: NURSE PRACTITIONER

## 2017-09-11 PROCEDURE — 99232 SBSQ HOSP IP/OBS MODERATE 35: CPT | Performed by: INTERNAL MEDICINE

## 2017-09-11 PROCEDURE — 85027 COMPLETE CBC AUTOMATED: CPT

## 2017-09-11 PROCEDURE — 99232 SBSQ HOSP IP/OBS MODERATE 35: CPT | Performed by: PSYCHIATRY & NEUROLOGY

## 2017-09-11 PROCEDURE — 94762 N-INVAS EAR/PLS OXIMTRY CONT: CPT

## 2017-09-11 PROCEDURE — 36415 COLL VENOUS BLD VENIPUNCTURE: CPT

## 2017-09-11 PROCEDURE — 6370000000 HC RX 637 (ALT 250 FOR IP): Performed by: HOSPITALIST

## 2017-09-11 PROCEDURE — 85610 PROTHROMBIN TIME: CPT

## 2017-09-11 PROCEDURE — 85730 THROMBOPLASTIN TIME PARTIAL: CPT

## 2017-09-11 PROCEDURE — 97535 SELF CARE MNGMENT TRAINING: CPT

## 2017-09-11 PROCEDURE — 6370000000 HC RX 637 (ALT 250 FOR IP): Performed by: INTERNAL MEDICINE

## 2017-09-11 PROCEDURE — 6370000000 HC RX 637 (ALT 250 FOR IP): Performed by: EMERGENCY MEDICINE

## 2017-09-11 PROCEDURE — 6370000000 HC RX 637 (ALT 250 FOR IP): Performed by: STUDENT IN AN ORGANIZED HEALTH CARE EDUCATION/TRAINING PROGRAM

## 2017-09-11 PROCEDURE — 94640 AIRWAY INHALATION TREATMENT: CPT

## 2017-09-11 PROCEDURE — 80048 BASIC METABOLIC PNL TOTAL CA: CPT

## 2017-09-11 RX ORDER — WARFARIN SODIUM 2 MG/1
TABLET ORAL
Qty: 30 TABLET | Refills: 2 | Status: SHIPPED | OUTPATIENT
Start: 2017-09-11

## 2017-09-11 RX ORDER — IBUPROFEN 600 MG/1
600 TABLET ORAL EVERY 6 HOURS PRN
Qty: 120 TABLET | Refills: 3 | Status: SHIPPED | OUTPATIENT
Start: 2017-09-11 | End: 2017-09-27

## 2017-09-11 RX ORDER — NITROFURANTOIN 25; 75 MG/1; MG/1
100 CAPSULE ORAL EVERY 12 HOURS SCHEDULED
Status: DISCONTINUED | OUTPATIENT
Start: 2017-09-11 | End: 2017-09-11 | Stop reason: HOSPADM

## 2017-09-11 RX ORDER — CARVEDILOL 6.25 MG/1
6.25 TABLET ORAL 2 TIMES DAILY WITH MEALS
Qty: 60 TABLET | Refills: 3 | Status: SHIPPED | OUTPATIENT
Start: 2017-09-11

## 2017-09-11 RX ORDER — BUTALBITAL, ACETAMINOPHEN AND CAFFEINE 50; 325; 40 MG/1; MG/1; MG/1
1 TABLET ORAL EVERY 6 HOURS PRN
Qty: 180 TABLET | Refills: 3 | Status: SHIPPED | OUTPATIENT
Start: 2017-09-11 | End: 2017-09-27

## 2017-09-11 RX ORDER — NITROFURANTOIN 25; 75 MG/1; MG/1
100 CAPSULE ORAL 2 TIMES DAILY
Qty: 10 CAPSULE | Refills: 0 | Status: SHIPPED | OUTPATIENT
Start: 2017-09-11 | End: 2017-09-16

## 2017-09-11 RX ORDER — FLUTICASONE PROPIONATE 50 MCG
1 SPRAY, SUSPENSION (ML) NASAL DAILY
Qty: 1 BOTTLE | Refills: 3 | Status: SHIPPED | OUTPATIENT
Start: 2017-09-11 | End: 2021-09-29

## 2017-09-11 RX ORDER — ATORVASTATIN CALCIUM 40 MG/1
40 TABLET, FILM COATED ORAL NIGHTLY
Qty: 30 TABLET | Refills: 3 | Status: SHIPPED | OUTPATIENT
Start: 2017-09-11

## 2017-09-11 RX ORDER — LEVETIRACETAM 1000 MG/1
1500 TABLET ORAL 2 TIMES DAILY
Qty: 60 TABLET | Refills: 2 | Status: SHIPPED | OUTPATIENT
Start: 2017-09-11 | End: 2017-09-27 | Stop reason: SDUPTHER

## 2017-09-11 RX ADMIN — POLYETHYLENE GLYCOL 3350 17 G: 17 POWDER, FOR SOLUTION ORAL at 08:56

## 2017-09-11 RX ADMIN — WARFARIN SODIUM 4 MG: 2 TABLET ORAL at 17:44

## 2017-09-11 RX ADMIN — SPIRONOLACTONE 50 MG: 25 TABLET ORAL at 08:55

## 2017-09-11 RX ADMIN — METFORMIN HYDROCHLORIDE 1000 MG: 500 TABLET ORAL at 17:44

## 2017-09-11 RX ADMIN — FLUTICASONE PROPIONATE 1 SPRAY: 50 SPRAY, METERED NASAL at 08:56

## 2017-09-11 RX ADMIN — DOCUSATE SODIUM -SENNOSIDES 2 TABLET: 50; 8.6 TABLET, COATED ORAL at 08:54

## 2017-09-11 RX ADMIN — LEVETIRACETAM 1500 MG: 500 TABLET, FILM COATED ORAL at 08:55

## 2017-09-11 RX ADMIN — CARVEDILOL 6.25 MG: 6.25 TABLET, FILM COATED ORAL at 08:55

## 2017-09-11 RX ADMIN — NITROFURANTOIN MONOHYDRATE/MACROCRYSTALLINE 100 MG: 25; 75 CAPSULE ORAL at 10:48

## 2017-09-11 RX ADMIN — IBUPROFEN 600 MG: 400 TABLET ORAL at 08:55

## 2017-09-11 RX ADMIN — BUDESONIDE AND FORMOTEROL FUMARATE DIHYDRATE 2 PUFF: 160; 4.5 AEROSOL RESPIRATORY (INHALATION) at 09:30

## 2017-09-11 RX ADMIN — ACETAMINOPHEN AND CODEINE PHOSPHATE 1 TABLET: 300; 30 TABLET ORAL at 03:46

## 2017-09-11 RX ADMIN — METFORMIN HYDROCHLORIDE 1000 MG: 500 TABLET ORAL at 08:54

## 2017-09-11 RX ADMIN — SALINE NASAL SPRAY 2 DROP: 1.5 SOLUTION NASAL at 12:30

## 2017-09-11 RX ADMIN — ALLOPURINOL 300 MG: 300 TABLET ORAL at 08:55

## 2017-09-11 RX ADMIN — SALINE NASAL SPRAY 2 DROP: 1.5 SOLUTION NASAL at 08:56

## 2017-09-11 RX ADMIN — SALINE NASAL SPRAY 2 DROP: 1.5 SOLUTION NASAL at 16:15

## 2017-09-11 ASSESSMENT — PAIN DESCRIPTION - LOCATION: LOCATION: HEAD

## 2017-09-11 ASSESSMENT — PAIN SCALES - GENERAL
PAINLEVEL_OUTOF10: 8
PAINLEVEL_OUTOF10: 8
PAINLEVEL_OUTOF10: 6

## 2017-09-11 ASSESSMENT — PAIN DESCRIPTION - PAIN TYPE: TYPE: ACUTE PAIN

## 2017-09-12 LAB
CULTURE: ABNORMAL
CULTURE: ABNORMAL
Lab: ABNORMAL
ORGANISM: ABNORMAL
SPECIMEN DESCRIPTION: ABNORMAL
STATUS: ABNORMAL

## 2017-09-27 ENCOUNTER — OFFICE VISIT (OUTPATIENT)
Dept: NEUROLOGY | Age: 77
End: 2017-09-27
Payer: MEDICARE

## 2017-09-27 ENCOUNTER — HOSPITAL ENCOUNTER (OUTPATIENT)
Age: 77
Setting detail: SPECIMEN
Discharge: HOME | End: 2017-09-27
Payer: MEDICARE

## 2017-09-27 VITALS
HEART RATE: 73 BPM | WEIGHT: 146 LBS | HEIGHT: 65 IN | DIASTOLIC BLOOD PRESSURE: 73 MMHG | BODY MASS INDEX: 24.32 KG/M2 | SYSTOLIC BLOOD PRESSURE: 116 MMHG

## 2017-09-27 DIAGNOSIS — G40.909 SEIZURE DISORDER (HCC): ICD-10-CM

## 2017-09-27 DIAGNOSIS — G62.9 NEUROPATHY: ICD-10-CM

## 2017-09-27 DIAGNOSIS — S06.5XAA SUBDURAL HEMATOMA: Primary | ICD-10-CM

## 2017-09-27 LAB
FOLATE: >20 NG/ML
VITAMIN B-12: 293 PG/ML (ref 211–946)

## 2017-09-27 PROCEDURE — 1123F ACP DISCUSS/DSCN MKR DOCD: CPT | Performed by: NURSE PRACTITIONER

## 2017-09-27 PROCEDURE — 1090F PRES/ABSN URINE INCON ASSESS: CPT | Performed by: NURSE PRACTITIONER

## 2017-09-27 PROCEDURE — G8400 PT W/DXA NO RESULTS DOC: HCPCS | Performed by: NURSE PRACTITIONER

## 2017-09-27 PROCEDURE — 1111F DSCHRG MED/CURRENT MED MERGE: CPT | Performed by: NURSE PRACTITIONER

## 2017-09-27 PROCEDURE — 1036F TOBACCO NON-USER: CPT | Performed by: NURSE PRACTITIONER

## 2017-09-27 PROCEDURE — G8427 DOCREV CUR MEDS BY ELIG CLIN: HCPCS | Performed by: NURSE PRACTITIONER

## 2017-09-27 PROCEDURE — 99214 OFFICE O/P EST MOD 30 MIN: CPT | Performed by: NURSE PRACTITIONER

## 2017-09-27 PROCEDURE — G8420 CALC BMI NORM PARAMETERS: HCPCS | Performed by: NURSE PRACTITIONER

## 2017-09-27 PROCEDURE — 4040F PNEUMOC VAC/ADMIN/RCVD: CPT | Performed by: NURSE PRACTITIONER

## 2017-09-27 PROCEDURE — 3017F COLORECTAL CA SCREEN DOC REV: CPT | Performed by: NURSE PRACTITIONER

## 2017-09-27 RX ORDER — BUDESONIDE AND FORMOTEROL FUMARATE DIHYDRATE 160; 4.5 UG/1; UG/1
2 AEROSOL RESPIRATORY (INHALATION) 2 TIMES DAILY
COMMUNITY

## 2017-09-27 RX ORDER — ALLOPURINOL 100 MG/1
300 TABLET ORAL DAILY
COMMUNITY

## 2017-09-27 RX ORDER — LEVETIRACETAM 1000 MG/1
1500 TABLET ORAL 2 TIMES DAILY
Qty: 60 TABLET | Refills: 3 | Status: SHIPPED | OUTPATIENT
Start: 2017-09-27 | End: 2017-10-11 | Stop reason: SDUPTHER

## 2017-10-03 ENCOUNTER — OFFICE VISIT (OUTPATIENT)
Dept: NEUROSURGERY | Age: 77
End: 2017-10-03

## 2017-10-03 ENCOUNTER — HOSPITAL ENCOUNTER (OUTPATIENT)
Dept: CT IMAGING | Age: 77
Discharge: HOME | End: 2017-10-03
Payer: MEDICARE

## 2017-10-03 VITALS
DIASTOLIC BLOOD PRESSURE: 75 MMHG | WEIGHT: 146 LBS | BODY MASS INDEX: 24.32 KG/M2 | SYSTOLIC BLOOD PRESSURE: 135 MMHG | HEIGHT: 65 IN | HEART RATE: 80 BPM

## 2017-10-03 DIAGNOSIS — G40.909 SEIZURE DISORDER (HCC): Primary | ICD-10-CM

## 2017-10-03 DIAGNOSIS — S06.5XAA SUBDURAL HEMATOMA: ICD-10-CM

## 2017-10-03 PROCEDURE — 99024 POSTOP FOLLOW-UP VISIT: CPT | Performed by: NEUROLOGICAL SURGERY

## 2017-10-03 PROCEDURE — 70450 CT HEAD/BRAIN W/O DYE: CPT

## 2017-10-03 RX ORDER — CEFUROXIME AXETIL 500 MG/1
500 TABLET ORAL 2 TIMES DAILY
COMMUNITY
End: 2021-09-29

## 2017-10-03 RX ORDER — LANOLIN ALCOHOL/MO/W.PET/CERES
1000 CREAM (GRAM) TOPICAL DAILY
COMMUNITY

## 2017-10-03 NOTE — PROGRESS NOTES
This 72-year-old with DVT and committee in with the left acute subdural hematoma surgically removed had status epilepticus postoperative and 2 prolonged period of time for improvement    She is doing well now    Speech is almost return to normal.  She is a bit unsteady when walking    No neurological deficits elicited. Incision is well-healed    CT scan of the head showed complete his option of subdural hematoma    Patient is still on Keppra and needs neurology follow-up and they were not quite happy with the neurology when she was in the hospital    I have consultative Dr. Horace Lomax for his help him regarding the management of  Keppra.   We'll arrange consultation

## 2017-10-11 RX ORDER — LEVETIRACETAM 1000 MG/1
1500 TABLET ORAL 2 TIMES DAILY
Qty: 60 TABLET | Refills: 3 | Status: SHIPPED | OUTPATIENT
Start: 2017-10-11 | End: 2021-09-29

## 2017-10-11 NOTE — TELEPHONE ENCOUNTER
Pt needs refill of keppra. Was referred to neurology but is not scheduled to see them yet. Please refill or advise.

## 2017-10-12 RX ORDER — LEVETIRACETAM 1000 MG/1
1500 TABLET ORAL 2 TIMES DAILY
Qty: 60 TABLET | Refills: 3 | Status: CANCELLED | OUTPATIENT
Start: 2017-10-12

## 2017-10-12 NOTE — TELEPHONE ENCOUNTER
Janna called in for her mother  She is running out of Keppra and Dr. Daniel Perez is out of town so the neurosurg. haven't refilled it. They asked that they check with our office and see if Anthony Pandey NP can refill it since she saw her in followup. She uses [facility] in [place]. Next appt is in January.

## 2018-01-07 PROBLEM — S00.83XA TRAUMATIC HEMATOMA OF FOREHEAD: Status: ACTIVE | Noted: 2018-01-07

## 2021-09-29 ENCOUNTER — APPOINTMENT (OUTPATIENT)
Dept: ULTRASOUND IMAGING | Age: 81
End: 2021-09-29
Payer: MEDICARE

## 2021-09-29 ENCOUNTER — HOSPITAL ENCOUNTER (EMERGENCY)
Age: 81
Discharge: HOME | End: 2021-09-29
Attending: EMERGENCY MEDICINE
Payer: MEDICARE

## 2021-09-29 VITALS
OXYGEN SATURATION: 97 % | RESPIRATION RATE: 18 BRPM | HEART RATE: 54 BPM | TEMPERATURE: 97.9 F | HEIGHT: 65 IN | WEIGHT: 140 LBS | SYSTOLIC BLOOD PRESSURE: 151 MMHG | BODY MASS INDEX: 23.32 KG/M2 | DIASTOLIC BLOOD PRESSURE: 69 MMHG

## 2021-09-29 DIAGNOSIS — L03.116 LEFT LEG CELLULITIS: Primary | ICD-10-CM

## 2021-09-29 PROCEDURE — 93971 EXTREMITY STUDY: CPT

## 2021-09-29 PROCEDURE — 6370000000 HC RX 637 (ALT 250 FOR IP): Performed by: EMERGENCY MEDICINE

## 2021-09-29 PROCEDURE — 99284 EMERGENCY DEPT VISIT MOD MDM: CPT

## 2021-09-29 RX ORDER — LAMOTRIGINE 200 MG/1
200 TABLET ORAL 2 TIMES DAILY
COMMUNITY

## 2021-09-29 RX ORDER — DOXYCYCLINE 100 MG/1
100 CAPSULE ORAL 2 TIMES DAILY
Qty: 14 CAPSULE | Refills: 0 | Status: SHIPPED | OUTPATIENT
Start: 2021-09-29

## 2021-09-29 RX ORDER — CLOPIDOGREL BISULFATE 75 MG/1
75 TABLET ORAL DAILY
COMMUNITY

## 2021-09-29 RX ORDER — DOXYCYCLINE HYCLATE 100 MG
100 TABLET ORAL ONCE
Status: COMPLETED | OUTPATIENT
Start: 2021-09-29 | End: 2021-09-29

## 2021-09-29 RX ORDER — HYDRALAZINE HYDROCHLORIDE 10 MG/1
10 TABLET, FILM COATED ORAL DAILY
COMMUNITY

## 2021-09-29 RX ORDER — CLOBAZAM 10 MG/1
5 TABLET ORAL 2 TIMES DAILY
COMMUNITY

## 2021-09-29 RX ADMIN — DOXYCYCLINE HYCLATE 100 MG: 100 TABLET, COATED ORAL at 21:38

## 2021-09-29 ASSESSMENT — PAIN DESCRIPTION - LOCATION: LOCATION: LEG

## 2021-09-29 ASSESSMENT — PAIN DESCRIPTION - ORIENTATION: ORIENTATION: LEFT

## 2021-09-29 ASSESSMENT — PAIN SCALES - GENERAL: PAINLEVEL_OUTOF10: 2

## 2021-09-29 ASSESSMENT — PAIN DESCRIPTION - PAIN TYPE: TYPE: ACUTE PAIN

## 2021-09-29 NOTE — ED PROVIDER NOTES
[address]      Pt Name: Blaire Morales  MRN: [id number]  [place] 1940  Date of evaluation: 9/29/2021      CHIEF COMPLAINT       Chief Complaint   Patient presents with    Leg Swelling     bl    Wound Check     LEFT LOWER EXTREM          HISTORY OF PRESENT ILLNESS      The patient presents with leg swelling left worse than right and she is here for wound check. The patient scraped her left shin a couple weeks ago. She was on oral antibiotics but has completed the course. The area started to get more red and swollen. She was also put on a water pill. She does have a history of DVT. The patient denies chest pain or shortness of breath. She denies hemoptysis. She denies fever. Nothing makes her symptoms better or worse otherwise. She has been putting Neosporin and a dressing on the wound. REVIEW OF SYSTEMS       All systems reviewed and negative unless noted in HPI. The patient denies fever or constitutional symptoms. Denies vision change. Denies any sore throat or rhinorrhea. Denies any neck pain or stiffness. Denies chest pain or shortness of breath. No nausea,  vomiting or diarrhea. Denies any dysuria. Denies urinary frequency or hematuria. Denies musculoskeletal injury or pain. Injury to left leg with recent swelling and drainage. Denies fever. Recent peripheral edema. No recent psychiatric issues. History of DVT. History of diabetes. PAST MEDICAL HISTORY    has a past medical history of Asthma, CAD (coronary artery disease), Cancer ([address] Chronic kidney disease, DVT (deep venous thrombosis) ([address] Factor 5 Leiden mutation, heterozygous ([address] Head injury, Hearing loss, Heart attack ([address] Hx of blood clots, Hyperlipidemia, Hypertension, Osteoarthritis, Pulmonary emboli ([address] and Type II or unspecified type diabetes mellitus without mention of complication, not stated as uncontrolled.     SURGICAL HISTORY      has a past surgical history that includes Hysterectomy; Tonsillectomy; Colonoscopy; Skin cancer excision (Left, 8/25/2015); craniotomy (08/17/2017); craniotomy (Left, 8/17/2017); Vena Cava Filter Placement (08/18/2017); and   picc powerpicc double (8/25/2017). CURRENT MEDICATIONS       Previous Medications    ALLOPURINOL (ZYLOPRIM) 100 MG TABLET    Take 300 mg by mouth daily    ASCORBIC ACID (VITAMIN C) 500 MG TABLET    Take 500 mg by mouth daily. ATORVASTATIN (LIPITOR) 40 MG TABLET    Take 1 tablet by mouth nightly    BUDESONIDE-FORMOTEROL (SYMBICORT) 160-4.5 MCG/ACT AERO    Inhale 2 puffs into the lungs 2 times daily    CARVEDILOL (COREG) 6.25 MG TABLET    Take 1 tablet by mouth 2 times daily (with meals)    CHOLECALCIFEROL (VITAMIN D3) 2000 UNITS CAPS    Take 2,000 Units by mouth. CLOBAZAM (ONFI) 10 MG TABS TABLET    Take 5 mg by mouth 2 times daily. CLOPIDOGREL (PLAVIX) 75 MG TABLET    Take 75 mg by mouth daily    HYDRALAZINE (APRESOLINE) 10 MG TABLET    Take 10 mg by mouth daily    LAMOTRIGINE (LAMICTAL) 200 MG TABLET    Take 200 mg by mouth 2 times daily    MAGNESIUM 400 MG CAPS    Take by mouth Daily    MULTIPLE VITAMINS-MINERALS (THERAPEUTIC MULTIVITAMIN-MINERALS) TABLET    Take 1 tablet by mouth daily. SPIRONOLACTONE (ALDACTONE) 50 MG TABLET    Take 25 mg by mouth daily     VITAMIN B-12 (CYANOCOBALAMIN) 1000 MCG TABLET    Take 1,000 mcg by mouth daily    WARFARIN (COUMADIN) 2 MG TABLET    Monitor INR and follow with your PCP. ALLERGIES     is allergic to aspirin, augmentin [amoxicillin-pot clavulanate], ciprofloxacin, clavulanic acid, cozaar [losartan potassium], crestor [rosuvastatin calcium], erythromycin, gabapentin, livalo [pitavastatin], simvastatin, and vytorin [ezetimibe-simvastatin]. FAMILY HISTORY     She indicated that the status of her mother is unknown. She indicated that the status of her father is unknown.  She indicated that the status of her sister is unknown. She indicated that the status of her brother is unknown. She indicated that the status of her paternal grandmother is unknown. She indicated that the status of her daughter is unknown. She indicated that the status of her son is unknown. She indicated that the status of her maternal aunt is unknown.     family history includes Cancer in her daughter, maternal aunt, mother, and son; Diabetes in her father and sister; Heart Disease in her father and mother; High Blood Pressure in her brother, father, mother, and sister; Migraines in her paternal grandmother; Stroke in her father. SOCIAL HISTORY      reports that she is a non-smoker but has been exposed to tobacco smoke. She has never used smokeless tobacco. She reports previous alcohol use. She reports that she does not use drugs. PHYSICAL EXAM     INITIAL VITALS:  height is 5' 5\" (1.651 m) and weight is 63.5 kg (140 lb). Her oral temperature is 97.9 °F (36.6 °C). Her blood pressure is 151/69 (abnormal) and her pulse is 54. Her respiration is 18 and oxygen saturation is 97%. The patient is alert and oriented, in no apparent distress. HEENT is atraumatic. Pupils are PERRL at 4 mm. Mucous membranes moist.    Neck is supple. Heart sounds regular rate and rhythm with no gallops, murmurs, or rubs. Lungs clear, no wheezes, rales or rhonchi. Abdomen: soft, nontender with no pain to palpation. No pulsatile mass. Normal bowel sounds are noted. No rebound or guarding. Musculoskeletal exam: 2 cm area of abrasion with surrounding redness measuring 2 x 3 cm consistent with mild cellulitis. 1-2+ edema in left leg. Only trace edema in right leg. Skin: No rash. Bilateral leg edema. Neurological exam reveals cranial nerves 2 through 12 grossly intact. Patient has equal  and normal deep tendon reflexes. Psychiatric: Appropriate  Lymphatics.:  No lymphadenopathy.          DIFFERENTIAL DIAGNOSIS/ MDM:     Abrasion, infection, DVT    DIAGNOSTIC RESULTS       RADIOLOGY:   I reviewed the radiologist interpretations:  US DUP LOWER EXTREMITY LEFT ROSALINA   Final Result   No evidence of DVT in the left lower extremity.               US DUP LOWER EXTREMITY LEFT ROSALINA (Final result)  Result time 09/29/21 21:22:40  Final result by Syl Loja MD (09/29/21 21:22:40)                Impression:    No evidence of DVT in the left lower extremity. Narrative:    EXAMINATION:   DUPLEX VENOUS ULTRASOUND OF THE LEFT LOWER EXTREMITY     9/29/2021 8:36 pm     TECHNIQUE:   Duplex ultrasound using B-mode/gray scaled imaging and Doppler spectral   analysis and color flow was obtained of the deep venous structures of the   left lower extremity. COMPARISON:   None. HISTORY:   ORDERING SYSTEM PROVIDED HISTORY: pain and swelling   TECHNOLOGIST PROVIDED HISTORY:   pain and swelling     FINDINGS:   The visualized veins of the left lower extremity are patent and free of   echogenic thrombus. The veins demonstrate good compressibility with normal   color flow study and spectral analysis.                       EMERGENCY DEPARTMENT COURSE:   Vitals:    Vitals:    09/29/21 1924   BP: (!) 151/69   Pulse: 54   Resp: 18   Temp: 97.9 °F (36.6 °C)   TempSrc: Oral   SpO2: 97%   Weight: 63.5 kg (140 lb)   Height: 5' 5\" (1.651 m)     -------------------------  BP: (!) 151/69, Temp: 97.9 °F (36.6 °C), Pulse: 54, Resp: 18      Re-evaluation Notes    I think the patient has cellulitis. She should continue to elevate her leg and change dressing daily. I will write for antibiotics. She should follow-up with her doctor. The patient is discharged in good condition. PROCEDURES:    A dressing was applied to the area of the patient's leg    FINAL IMPRESSION      1.  Left leg cellulitis          DISPOSITION/PLAN   DISPOSITION Decision To Discharge 09/29/2021 09:29:46 PM      Condition on Disposition    good    PATIENT REFERRED TO:  Nash Meckel, MD  [address] [place] [address],[facility], [id number] [address]  [phone]    In 1 week        DISCHARGE MEDICATIONS:  New Prescriptions    DOXYCYCLINE MONOHYDRATE (MONODOX) 100 MG CAPSULE    Take 1 capsule by mouth 2 times daily       (Please note that portions of this note were completed with a voice recognition program.  Efforts were made to edit the dictations but occasionally words are mis-transcribed.)    Viola Pritchard MD,, MD   Attending Emergency Physician         Sandra Agarwal MD  09/29/21 4030

## 2021-09-29 NOTE — ED NOTES
Nikko Luis, [address] , notified over phone of karyn Conte, RN  09/29/21 1066

## 2021-09-30 NOTE — ED NOTES
Pt to ER with , ambulated to room, steady gait. Pt reports she ran into something about 2 weeks resulting in wound to left lower leg. Pt reports she has been seen at PCP three times, reports abx course and \"water pills\" taken, but area red warm to touch and swollen. Pt does report h/o blood clots, concerned about this as well. Pt rates pain 2/10, denies analgesics PTA.  Pt calm, cooperative, respers even non labored, skin warm pink, no distress, here for eval.      Margarita Vazquez, KARISSA  09/29/21 6529

## 2023-08-02 LAB
ALBUMIN SERPL-MCNC: 4.3 G/DL
ALP BLD-CCNC: 82 U/L
ALT SERPL-CCNC: 28 U/L
ANION GAP SERPL CALCULATED.3IONS-SCNC: NORMAL MMOL/L
AST SERPL-CCNC: 36 U/L
AVERAGE GLUCOSE: 123
BILIRUB SERPL-MCNC: 0.5 MG/DL (ref 0.1–1.4)
BUN BLDV-MCNC: 14 MG/DL
CALCIUM SERPL-MCNC: 9.8 MG/DL
CHLORIDE BLD-SCNC: 101 MMOL/L
CHOLESTEROL, FASTING: 184
CO2: 31 MMOL/L
CREAT SERPL-MCNC: 0.7 MG/DL
EGFR: 80.5
GLUCOSE BLD-MCNC: 91 MG/DL
HBA1C MFR BLD: 5.9 %
HDLC SERPL-MCNC: 68 MG/DL (ref 35–70)
LDL CHOLESTEROL CALCULATED: 92 MG/DL (ref 0–160)
POTASSIUM SERPL-SCNC: 4.7 MMOL/L
SODIUM BLD-SCNC: 138 MMOL/L
TOTAL PROTEIN: 7.1
TRIGLYCERIDE, FASTING: 119

## 2023-08-16 PROBLEM — J30.2 SEASONAL ALLERGIES: Status: ACTIVE | Noted: 2022-09-13

## 2023-08-16 PROBLEM — E27.40 HYPOALDOSTERONISM (HCC): Status: ACTIVE | Noted: 2017-12-22

## 2023-08-16 PROBLEM — D68.69 ACQUIRED HYPERCOAGULABLE STATE (HCC): Status: ACTIVE | Noted: 2022-09-13

## 2023-08-16 PROBLEM — I26.99 PULMONARY EMBOLISM (HCC): Status: ACTIVE | Noted: 2017-12-22

## 2023-08-16 PROBLEM — E11.649 HYPOGLYCEMIA DUE TO TYPE 2 DIABETES MELLITUS (HCC): Status: ACTIVE | Noted: 2020-05-21

## 2023-08-16 PROBLEM — S06.5XAA: Status: ACTIVE | Noted: 2018-05-11

## 2023-08-16 PROBLEM — G45.9 TRANSIENT ISCHEMIC ATTACK: Status: ACTIVE | Noted: 2021-02-10

## 2023-08-16 PROBLEM — K57.92 DIVERTICULITIS OF INTESTINE: Status: ACTIVE | Noted: 2018-04-06

## 2023-08-16 PROBLEM — I16.0 HYPERTENSIVE URGENCY: Status: ACTIVE | Noted: 2023-01-01

## 2023-08-16 PROBLEM — R26.9 ABNORMAL GAIT: Status: ACTIVE | Noted: 2022-09-13

## 2023-08-16 PROBLEM — K21.9 LARYNGOPHARYNGEAL REFLUX (LPR): Status: ACTIVE | Noted: 2022-09-13

## 2023-08-16 PROBLEM — I42.9 CARDIOMYOPATHY (HCC): Status: ACTIVE | Noted: 2018-06-28

## 2023-08-16 PROBLEM — D68.51 ACTIVATED PROTEIN C RESISTANCE (HCC): Status: ACTIVE | Noted: 2018-04-06

## 2023-08-16 PROBLEM — R06.09 OTHER FORMS OF DYSPNEA: Status: ACTIVE | Noted: 2022-12-16

## 2023-08-16 PROBLEM — U07.1 COVID-19: Status: ACTIVE | Noted: 2022-09-13

## 2023-08-16 PROBLEM — H91.90 HEARING LOSS: Status: ACTIVE | Noted: 2020-05-21

## 2023-08-16 PROBLEM — R51.9 HEADACHE: Status: ACTIVE | Noted: 2020-05-21

## 2023-08-16 PROBLEM — R49.0 HOARSE VOICE QUALITY: Status: ACTIVE | Noted: 2022-09-13

## 2023-08-16 PROBLEM — M79.605 PAIN IN LEFT LEG: Status: ACTIVE | Noted: 2020-05-21

## 2023-08-16 PROBLEM — I65.23 OCCLUSION AND STENOSIS OF BILATERAL CAROTID ARTERIES: Status: ACTIVE | Noted: 2020-05-21

## 2023-08-16 PROBLEM — F32.9 REACTIVE DEPRESSION (SITUATIONAL): Status: ACTIVE | Noted: 2021-02-10

## 2023-08-16 PROBLEM — S06.9X9A: Status: ACTIVE | Noted: 2023-01-01

## 2023-08-16 PROBLEM — G93.41 METABOLIC ENCEPHALOPATHY: Status: ACTIVE | Noted: 2018-04-06

## 2023-08-16 PROBLEM — R32 URINARY INCONTINENCE: Status: ACTIVE | Noted: 2023-02-17

## 2023-08-16 PROBLEM — N95.2 ATROPHY OF VAGINA: Status: ACTIVE | Noted: 2021-02-10

## 2023-08-16 PROBLEM — K57.92 DIVERTICULITIS: Status: ACTIVE | Noted: 2018-05-11

## 2023-08-16 PROBLEM — R07.89 OTHER CHEST PAIN: Status: ACTIVE | Noted: 2022-12-16

## 2023-08-16 PROBLEM — H91.93 DECREASED HEARING OF BOTH EARS: Status: ACTIVE | Noted: 2020-05-21

## 2023-08-16 PROBLEM — L02.214 GROIN ABSCESS: Status: ACTIVE | Noted: 2017-12-22

## 2023-08-16 PROBLEM — J45.31 MILD PERSISTENT ASTHMA WITH (ACUTE) EXACERBATION: Status: ACTIVE | Noted: 2021-10-21

## 2023-08-16 PROBLEM — S22.31XA RIGHT RIB FRACTURE: Status: ACTIVE | Noted: 2023-01-01

## 2023-08-16 PROBLEM — I87.2 PERIPHERAL VENOUS INSUFFICIENCY: Status: ACTIVE | Noted: 2022-09-13

## 2023-08-16 PROBLEM — H93.13 BILATERAL TINNITUS: Status: ACTIVE | Noted: 2020-05-21

## 2023-08-16 PROBLEM — Z86.718 PERSONAL HISTORY OF OTHER VENOUS THROMBOSIS AND EMBOLISM: Status: ACTIVE | Noted: 2018-04-06

## 2023-08-16 PROBLEM — L97.909 NON-PRESSURE ULCER OF LOWER EXTREMITY (HCC): Status: ACTIVE | Noted: 2023-08-16

## 2023-08-16 PROBLEM — R19.7 DIARRHEA, UNSPECIFIED: Status: ACTIVE | Noted: 2023-07-17

## 2023-08-16 PROBLEM — J45.909 ASTHMA: Status: ACTIVE | Noted: 2023-08-16

## 2023-08-16 PROBLEM — Z79.84 LONG TERM (CURRENT) USE OF ORAL HYPOGLYCEMIC DRUGS: Status: ACTIVE | Noted: 2018-04-06

## 2023-08-16 PROBLEM — L98.9 DISORDER OF THE SKIN AND SUBCUTANEOUS TISSUE, UNSPECIFIED: Status: ACTIVE | Noted: 2020-05-21

## 2023-08-16 PROBLEM — R91.8 MULTIPLE NODULES OF LUNG: Status: ACTIVE | Noted: 2020-05-21

## 2023-08-16 PROBLEM — Z88.6 ALLERGY STATUS TO ANALGESIC AGENT: Status: ACTIVE | Noted: 2018-04-06

## 2023-08-16 PROBLEM — N18.9 CHRONIC RENAL INSUFFICIENCY: Status: ACTIVE | Noted: 2017-12-22

## 2023-08-16 PROBLEM — Z79.01 LONG TERM (CURRENT) USE OF ANTICOAGULANTS: Status: ACTIVE | Noted: 2018-04-06

## 2023-08-16 PROBLEM — A04.72 CLOSTRIDIUM ENTEROCOLITIS: Status: ACTIVE | Noted: 2020-05-21

## 2023-08-16 PROBLEM — S01.412D: Status: ACTIVE | Noted: 2023-01-01

## 2023-08-16 PROBLEM — D47.2 IGM LAMBDA PARAPROTEINEMIA: Status: ACTIVE | Noted: 2017-12-22

## 2023-08-16 PROBLEM — M17.9 OSTEOARTHRITIS OF KNEE: Status: ACTIVE | Noted: 2020-05-21

## 2023-08-16 PROBLEM — R41.82 ALTERED MENTAL STATUS: Status: ACTIVE | Noted: 2018-04-06

## 2023-08-16 PROBLEM — R42 VERTIGO: Status: ACTIVE | Noted: 2021-02-10

## 2023-08-16 PROBLEM — G40.109 LOCALIZATION-RELATED (FOCAL) (PARTIAL) SYMPTOMATIC EPILEPSY AND EPILEPTIC SYNDROMES WITH SIMPLE PARTIAL SEIZURES, NOT INTRACTABLE, WITHOUT STATUS EPILEPTICUS (HCC): Status: ACTIVE | Noted: 2018-04-06

## 2023-08-16 PROBLEM — D68.52 PROTHROMBIN GENE MUTATION (HCC): Status: ACTIVE | Noted: 2017-12-22

## 2023-08-16 PROBLEM — E53.8 VITAMIN B 12 DEFICIENCY: Status: ACTIVE | Noted: 2023-01-20

## 2023-08-16 PROBLEM — R00.2 PALPITATIONS: Status: ACTIVE | Noted: 2020-05-21

## 2023-08-16 PROBLEM — G57.90 MONONEUROPATHY OF LOWER EXTREMITY: Status: ACTIVE | Noted: 2020-05-21

## 2023-08-16 PROBLEM — L89.629 DECUBITUS ULCER OF LEFT HEEL: Status: ACTIVE | Noted: 2017-12-22

## 2023-08-16 PROBLEM — E78.5 DYSLIPIDEMIA: Status: ACTIVE | Noted: 2017-12-22

## 2023-08-16 PROBLEM — D72.829 LEUKOCYTOSIS: Status: ACTIVE | Noted: 2022-09-13

## 2023-08-16 PROBLEM — E53.8 COBALAMIN DEFICIENCY: Status: ACTIVE | Noted: 2020-05-21

## 2023-08-16 PROBLEM — K21.9 GASTROESOPHAGEAL REFLUX DISEASE: Status: ACTIVE | Noted: 2017-12-22

## 2023-08-16 PROBLEM — D83.9 COMMON VARIABLE IMMUNODEFICIENCY (HCC): Status: ACTIVE | Noted: 2017-12-22

## 2023-08-16 PROBLEM — M10.9 GOUT: Status: ACTIVE | Noted: 2018-04-06

## 2023-08-16 PROBLEM — M51.369 DEGENERATION OF LUMBAR INTERVERTEBRAL DISC: Status: ACTIVE | Noted: 2020-05-21

## 2023-08-16 PROBLEM — N60.19 FIBROCYSTIC BREAST CHANGES: Status: ACTIVE | Noted: 2020-05-21

## 2023-08-16 PROBLEM — Z86.711 HISTORY OF PULMONARY EMBOLISM: Status: ACTIVE | Noted: 2018-04-06

## 2023-08-16 PROBLEM — S06.9X9A UNSPECIFIED INTRACRANIAL INJURY WITH LOSS OF CONSCIOUSNESS OF UNSPECIFIED DURATION, INITIAL ENCOUNTER (HCC): Status: ACTIVE | Noted: 2018-04-06

## 2023-08-16 PROBLEM — D68.4 COAGULATION FACTOR DEFICIENCY SYNDROME (HCC): Status: ACTIVE | Noted: 2020-05-21

## 2023-08-16 PROBLEM — I25.10 ATHEROSCLEROSIS OF NATIVE CORONARY ARTERY OF NATIVE HEART WITHOUT ANGINA PECTORIS: Status: ACTIVE | Noted: 2023-03-08

## 2023-08-16 PROBLEM — M51.36 DEGENERATION OF LUMBAR INTERVERTEBRAL DISC: Status: ACTIVE | Noted: 2020-05-21

## 2023-08-16 RX ORDER — MAGNESIUM OXIDE 400 MG/1
400 TABLET ORAL DAILY
COMMUNITY

## 2023-08-16 RX ORDER — VANCOMYCIN HYDROCHLORIDE 250 MG/1
250 CAPSULE ORAL NIGHTLY
COMMUNITY

## 2023-08-16 RX ORDER — PYRIDOSTIGMINE BROMIDE 60 MG/1
TABLET ORAL
COMMUNITY
Start: 2023-01-14

## 2023-08-16 RX ORDER — SACCHAROMYCES BOULARDII 250 MG
500 CAPSULE ORAL 2 TIMES DAILY
Qty: 120 CAPSULE | Refills: 0 | COMMUNITY
Start: 2023-07-17 | End: 2023-08-16

## 2023-08-16 RX ORDER — MECLIZINE HCL 12.5 MG/1
TABLET ORAL
COMMUNITY

## 2023-08-16 RX ORDER — LABETALOL 100 MG/1
TABLET, FILM COATED ORAL
COMMUNITY
Start: 2023-05-23

## 2023-08-16 RX ORDER — ISOSORBIDE MONONITRATE 30 MG/1
30 TABLET, EXTENDED RELEASE ORAL DAILY
Qty: 30 TABLET | Refills: 11 | COMMUNITY
Start: 2022-12-16 | End: 2023-12-16

## 2023-08-16 RX ORDER — VANCOMYCIN HYDROCHLORIDE 250 MG/1
CAPSULE ORAL
COMMUNITY
Start: 2023-06-02

## 2023-08-16 RX ORDER — CEPHALEXIN 500 MG/1
CAPSULE ORAL
COMMUNITY

## 2023-08-16 RX ORDER — ALBUTEROL SULFATE 90 UG/1
AEROSOL, METERED RESPIRATORY (INHALATION)
COMMUNITY

## 2023-08-16 RX ORDER — PREDNISONE 20 MG/1
TABLET ORAL
COMMUNITY
Start: 2023-07-19

## 2023-08-16 RX ORDER — VANCOMYCIN HYDROCHLORIDE 125 MG/1
CAPSULE ORAL
COMMUNITY
Start: 2022-12-10

## 2023-08-16 RX ORDER — CENOBAMATE 100 MG/1
TABLET, FILM COATED ORAL
COMMUNITY

## 2023-08-16 RX ORDER — LABETALOL 100 MG/1
100 TABLET, FILM COATED ORAL 2 TIMES DAILY
COMMUNITY
Start: 2023-01-12

## 2023-08-16 RX ORDER — AZITHROMYCIN 250 MG/1
TABLET, FILM COATED ORAL
COMMUNITY

## 2023-08-16 RX ORDER — ENOXAPARIN SODIUM 100 MG/ML
INJECTION SUBCUTANEOUS
COMMUNITY
Start: 2023-07-07

## 2023-08-16 RX ORDER — RANITIDINE 150 MG/1
TABLET ORAL
COMMUNITY

## 2023-08-16 RX ORDER — CEFUROXIME AXETIL 500 MG/1
TABLET ORAL
COMMUNITY
Start: 2023-02-16

## 2023-08-16 RX ORDER — LAMOTRIGINE 100 MG/1
TABLET ORAL
COMMUNITY
Start: 2023-08-01

## 2023-08-16 RX ORDER — ENOXAPARIN SODIUM 100 MG/ML
INJECTION SUBCUTANEOUS
COMMUNITY
Start: 2023-07-10

## 2023-08-16 RX ORDER — ALLOPURINOL 300 MG/1
TABLET ORAL
COMMUNITY
Start: 2023-06-03

## 2023-08-16 RX ORDER — POLYETHYLENE GLYCOL-3350 AND ELECTROLYTES 236; 6.74; 5.86; 2.97; 22.74 G/274.31G; G/274.31G; G/274.31G; G/274.31G; G/274.31G
POWDER, FOR SOLUTION ORAL
COMMUNITY
Start: 2023-06-09

## 2023-08-16 RX ORDER — LORATADINE 10 MG/1
TABLET ORAL
COMMUNITY
Start: 2022-08-31

## 2023-08-16 RX ORDER — FLUTICASONE PROPIONATE AND SALMETEROL XINAFOATE 115; 21 UG/1; UG/1
AEROSOL, METERED RESPIRATORY (INHALATION)
COMMUNITY
Start: 2023-01-18

## 2023-08-16 RX ORDER — FLUCONAZOLE 100 MG/1
TABLET ORAL
COMMUNITY

## 2023-08-28 ENCOUNTER — OFFICE VISIT (OUTPATIENT)
Age: 83
End: 2023-08-28
Payer: MEDICARE

## 2023-08-28 VITALS
OXYGEN SATURATION: 98 % | WEIGHT: 140 LBS | SYSTOLIC BLOOD PRESSURE: 130 MMHG | BODY MASS INDEX: 23.32 KG/M2 | HEART RATE: 98 BPM | TEMPERATURE: 97.2 F | HEIGHT: 65 IN | RESPIRATION RATE: 16 BRPM | DIASTOLIC BLOOD PRESSURE: 66 MMHG

## 2023-08-28 DIAGNOSIS — G40.909 SEIZURE DISORDER (HCC): ICD-10-CM

## 2023-08-28 DIAGNOSIS — D68.69 ACQUIRED HYPERCOAGULABLE STATE (HCC): Primary | ICD-10-CM

## 2023-08-28 PROCEDURE — 3078F DIAST BP <80 MM HG: CPT | Performed by: FAMILY MEDICINE

## 2023-08-28 PROCEDURE — 3075F SYST BP GE 130 - 139MM HG: CPT | Performed by: FAMILY MEDICINE

## 2023-08-28 PROCEDURE — 1123F ACP DISCUSS/DSCN MKR DOCD: CPT | Performed by: FAMILY MEDICINE

## 2023-08-28 PROCEDURE — 99214 OFFICE O/P EST MOD 30 MIN: CPT | Performed by: FAMILY MEDICINE

## 2023-08-28 RX ORDER — BRIVARACETAM 75 MG/1
TABLET, FILM COATED ORAL
COMMUNITY

## 2023-08-28 SDOH — ECONOMIC STABILITY: FOOD INSECURITY: WITHIN THE PAST 12 MONTHS, THE FOOD YOU BOUGHT JUST DIDN'T LAST AND YOU DIDN'T HAVE MONEY TO GET MORE.: NEVER TRUE

## 2023-08-28 SDOH — ECONOMIC STABILITY: HOUSING INSECURITY
IN THE LAST 12 MONTHS, WAS THERE A TIME WHEN YOU DID NOT HAVE A STEADY PLACE TO SLEEP OR SLEPT IN A SHELTER (INCLUDING NOW)?: NO

## 2023-08-28 SDOH — ECONOMIC STABILITY: INCOME INSECURITY: HOW HARD IS IT FOR YOU TO PAY FOR THE VERY BASICS LIKE FOOD, HOUSING, MEDICAL CARE, AND HEATING?: NOT HARD AT ALL

## 2023-08-28 SDOH — ECONOMIC STABILITY: FOOD INSECURITY: WITHIN THE PAST 12 MONTHS, YOU WORRIED THAT YOUR FOOD WOULD RUN OUT BEFORE YOU GOT MONEY TO BUY MORE.: NEVER TRUE

## 2023-08-28 ASSESSMENT — PATIENT HEALTH QUESTIONNAIRE - PHQ9
7. TROUBLE CONCENTRATING ON THINGS, SUCH AS READING THE NEWSPAPER OR WATCHING TELEVISION: 0
4. FEELING TIRED OR HAVING LITTLE ENERGY: 0
8. MOVING OR SPEAKING SO SLOWLY THAT OTHER PEOPLE COULD HAVE NOTICED. OR THE OPPOSITE, BEING SO FIGETY OR RESTLESS THAT YOU HAVE BEEN MOVING AROUND A LOT MORE THAN USUAL: 0
SUM OF ALL RESPONSES TO PHQ QUESTIONS 1-9: 0
2. FEELING DOWN, DEPRESSED OR HOPELESS: 0
10. IF YOU CHECKED OFF ANY PROBLEMS, HOW DIFFICULT HAVE THESE PROBLEMS MADE IT FOR YOU TO DO YOUR WORK, TAKE CARE OF THINGS AT HOME, OR GET ALONG WITH OTHER PEOPLE: 0
9. THOUGHTS THAT YOU WOULD BE BETTER OFF DEAD, OR OF HURTING YOURSELF: 0
SUM OF ALL RESPONSES TO PHQ QUESTIONS 1-9: 0
SUM OF ALL RESPONSES TO PHQ QUESTIONS 1-9: 0
5. POOR APPETITE OR OVEREATING: 0
3. TROUBLE FALLING OR STAYING ASLEEP: 0
SUM OF ALL RESPONSES TO PHQ9 QUESTIONS 1 & 2: 0
SUM OF ALL RESPONSES TO PHQ QUESTIONS 1-9: 0
1. LITTLE INTEREST OR PLEASURE IN DOING THINGS: 0
6. FEELING BAD ABOUT YOURSELF - OR THAT YOU ARE A FAILURE OR HAVE LET YOURSELF OR YOUR FAMILY DOWN: 0

## 2023-08-28 NOTE — PROGRESS NOTES
Patient has no other healthcare issues. No fever no sweats no chills no chest pain or shortness of breath no nausea no vomiting no diarrhea no  complaints no edema issues. [facility]     Date of Visit:  2023  Patient Name: Codey Doll   Patient :  1940     CHIEF COMPLAINT/HPI:     Viji Lemons is a 82 y.o. female who presents today for an general visit to be evaluated for the following condition(s):  Chief Complaint   Patient presents with    Follow-up     Patient is here for routine checkup. 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 REVIEW OF SYSTEM      Review of SystemsPatient has no other healthcare issues. No fever no sweats no chills no chest pain or shortness of breath no nausea no vomiting no diarrhea no  complaints no edema issues. REVIEWED INFORMATION      Allergies   Allergen Reactions    Influenza Vaccines Anaphylaxis    Potassium Chloride     Amlodipine      Other reaction(s): Unknown    Amoxicillin     Aspirin     Augmentin [Amoxicillin-Pot Clavulanate] Diarrhea    Ciprofibrate     Ciprofloxacin     Clavulanic Acid     Cozaar [Losartan Potassium]     Crestor [Rosuvastatin Calcium]     Doxycycline      Other reaction(s): Unknown      Erythromycin     Gabapentin     Lacosamide     Lipitor [Atorvastatin]     Lisinopril Cough     From cardiology notes in [place]      Losartan     Pitavastatin Other (See Comments)    Simvastatin     Tiotropium Bromide-Olodaterol      Other reaction(s): dizziness    Vytorin [Ezetimibe-Simvastatin]     Adhesive Tape        Current Outpatient Medications   Medication Sig Note Dispense Refill    albuterol sulfate HFA (PROVENTIL;VENTOLIN;PROAIR) 108 (90 Base) MCG/ACT inhaler As directed as needed. allopurinol (ZYLOPRIM) 300 MG tablet        brivaracetam (BRIVIACT) 25 MG TABS tablet Take 2 tablets by mouth daily.  2023: 2

## 2023-09-05 RX ORDER — ALLOPURINOL 300 MG/1
300 TABLET ORAL DAILY
Qty: 90 TABLET | Refills: 0 | Status: SHIPPED | OUTPATIENT
Start: 2023-09-05

## 2023-09-05 RX ORDER — ALLOPURINOL 300 MG/1
TABLET ORAL
Qty: 90 TABLET | OUTPATIENT
Start: 2023-09-05

## 2023-09-05 NOTE — TELEPHONE ENCOUNTER
Jose Nieto is calling to request a refill on the following medication(s):    Medication Request:  Requested Prescriptions     Pending Prescriptions Disp Refills    allopurinol (ZYLOPRIM) 300 MG tablet 90 tablet 0     Sig: Take 1 tablet by mouth daily       Last Visit Date (If Applicable):  3/10/8863    Next Visit Date:    11/27/2023

## 2023-10-05 ENCOUNTER — OFFICE VISIT (OUTPATIENT)
Age: 83
End: 2023-10-05

## 2023-10-05 VITALS
SYSTOLIC BLOOD PRESSURE: 112 MMHG | HEART RATE: 68 BPM | HEIGHT: 65 IN | BODY MASS INDEX: 23.49 KG/M2 | OXYGEN SATURATION: 98 % | RESPIRATION RATE: 14 BRPM | TEMPERATURE: 97.2 F | DIASTOLIC BLOOD PRESSURE: 68 MMHG | WEIGHT: 141 LBS

## 2023-10-05 DIAGNOSIS — J40 BRONCHITIS: Primary | ICD-10-CM

## 2023-10-05 DIAGNOSIS — Z92.29 HISTORY OF COUMADIN THERAPY: ICD-10-CM

## 2023-10-05 DIAGNOSIS — D68.51 FACTOR 5 LEIDEN MUTATION, HETEROZYGOUS (HCC): ICD-10-CM

## 2023-10-05 RX ORDER — ATORVASTATIN CALCIUM 20 MG/1
TABLET, FILM COATED ORAL
COMMUNITY
Start: 2023-08-07

## 2023-10-05 RX ORDER — BENZONATATE 100 MG/1
100-200 CAPSULE ORAL 3 TIMES DAILY PRN
Qty: 60 CAPSULE | Refills: 0 | Status: SHIPPED | OUTPATIENT
Start: 2023-10-05 | End: 2023-10-12

## 2023-10-05 RX ORDER — AZELASTINE 1 MG/ML
2 SPRAY, METERED NASAL 2 TIMES DAILY
Qty: 120 ML | Refills: 1 | Status: SHIPPED | OUTPATIENT
Start: 2023-10-05

## 2023-10-05 RX ORDER — AZITHROMYCIN 250 MG/1
250 TABLET, FILM COATED ORAL SEE ADMIN INSTRUCTIONS
Qty: 6 TABLET | Refills: 0 | Status: SHIPPED | OUTPATIENT
Start: 2023-10-05 | End: 2023-10-10

## 2023-10-05 RX ORDER — TRIAMCINOLONE ACETONIDE 40 MG/ML
40 INJECTION, SUSPENSION INTRA-ARTICULAR; INTRAMUSCULAR ONCE
Status: COMPLETED | OUTPATIENT
Start: 2023-10-05 | End: 2023-10-05

## 2023-10-05 RX ADMIN — TRIAMCINOLONE ACETONIDE 40 MG: 40 INJECTION, SUSPENSION INTRA-ARTICULAR; INTRAMUSCULAR at 15:27

## 2023-10-05 NOTE — PROGRESS NOTES
After obtaining consent, and per orders of Dr. Blackwood , injection of Kenalog-40 given in Left Ventrogluteal  by Saravanan Macdonald MA. Patient instructed to remain in clinic for 20 minutes afterwards, and to report any adverse reaction to me immediately. PT Tolerated well.

## 2023-10-05 NOTE — PROGRESS NOTES
[facility]      Date of Visit:  10/5/2023  Patient Name: Jose Angel Doll   Patient :  1940     CHIEF COMPLAINT/HPI:     Kinjal Marquez is a 82 y.o. female who presents today for an general visit to be evaluated for the following condition(s):  Chief Complaint   Patient presents with    Otalgia    Headache     Chills x 2 days     Patient has sinus congestion left ear pressure now getting a tracheal cough. She has a birthday party for her grandson this weekend and wants to be in shape  REVIEW OF SYSTEM      Review of Systems patient has had chills but no fever. She complains of left ear pressure sinus pain postnasal drainage and cough    REVIEWED INFORMATION      Allergies   Allergen Reactions    Influenza Vaccines Anaphylaxis    Potassium Chloride     Amlodipine      Other reaction(s): Unknown    Amoxicillin     Aspirin     Augmentin [Amoxicillin-Pot Clavulanate] Diarrhea    Ciprofibrate     Ciprofloxacin Other (See Comments)    Clavulanic Acid     Cozaar [Losartan Potassium]     Crestor [Rosuvastatin Calcium]     Doxycycline      Other reaction(s): Unknown      Erythromycin     Gabapentin     Lacosamide     Lipitor [Atorvastatin]     Lisinopril Cough     From cardiology notes in [place]      Losartan     Pitavastatin Other (See Comments)    Simvastatin     Tiotropium Bromide-Olodaterol      Other reaction(s): dizziness    Vytorin [Ezetimibe-Simvastatin]     Adhesive Tape        Current Outpatient Medications   Medication Sig Note Dispense Refill    allopurinol (ZYLOPRIM) 300 MG tablet Take 1 tablet by mouth daily  90 tablet 0    albuterol sulfate HFA (PROVENTIL;VENTOLIN;PROAIR) 108 (90 Base) MCG/ACT inhaler As directed as needed. brivaracetam (BRIVIACT) 25 MG TABS tablet Take 2 tablets by mouth daily.  2023: 2 tabs AM   3 tabs PM      labetalol (NORMODYNE) 100 MG tablet Take 1 tablet by mouth 2 times daily       lamoTRIgine (LAMICTAL) 100 MG tablet Take 1 tablet by mouth 2 times

## 2023-10-30 RX ORDER — CLOPIDOGREL BISULFATE 75 MG/1
75 TABLET ORAL DAILY
Qty: 90 TABLET | Refills: 2 | Status: SHIPPED | OUTPATIENT
Start: 2023-10-30

## 2023-10-30 NOTE — TELEPHONE ENCOUNTER
Jose Nieto is calling to request a refill on the following medication(s):    Medication Request:  Requested Prescriptions     Pending Prescriptions Disp Refills    clopidogrel (PLAVIX) 75 MG tablet [Pharmacy Med Name: CLOPIDOGREL 75 MG TABLET] 90 tablet      Sig: TAKE ONE TABLET BY MOUTH DAILY       Last Visit Date (If Applicable):  20/5/7947    Next Visit Date:    11/27/2023

## 2023-11-10 ENCOUNTER — OFFICE VISIT (OUTPATIENT)
Age: 83
End: 2023-11-10

## 2023-11-10 VITALS
BODY MASS INDEX: 23.87 KG/M2 | HEIGHT: 64 IN | OXYGEN SATURATION: 98 % | TEMPERATURE: 97.1 F | DIASTOLIC BLOOD PRESSURE: 82 MMHG | WEIGHT: 139.8 LBS | HEART RATE: 55 BPM | SYSTOLIC BLOOD PRESSURE: 138 MMHG

## 2023-11-10 DIAGNOSIS — H65.02 NON-RECURRENT ACUTE SEROUS OTITIS MEDIA OF LEFT EAR: Primary | ICD-10-CM

## 2023-11-10 RX ORDER — FLUTICASONE PROPIONATE 50 MCG
2 SPRAY, SUSPENSION (ML) NASAL DAILY
Qty: 16 G | Refills: 0 | Status: SHIPPED | OUTPATIENT
Start: 2023-11-10

## 2023-11-10 NOTE — PROGRESS NOTES
[address] [facility]  [address] [place] [id number]     Date of Visit:  2023  Patient Name: Radha Christensen   Patient :  1940     CHIEF COMPLAINT/HPI:     Chief Complaint   Patient presents with    Otalgia     Patient is here with ear pain, chills and headaches. Symptoms began Wed night. HPI      Radha Christensen, 82 y.o. presents today for acute issue. Late Wednesday night, she felt a little off and then by the next day she was having ear pain and chills. She had had a cough but that resolved. No fever. Generalized headache. Taking tylenol with temporary relief. REVIEW OF SYSTEM      Review of Systems:   Constitutional:  Negative for chills, fatigue, fever and unexpected weight change. Eyes:  Negative for visual disturbance. Respiratory:  Negative for cough, chest tightness, shortness of breath and wheezing. Cardiovascular:  Negative for chest pain, palpitations and leg swelling. Gastrointestinal:  Negative for abdominal distention, abdominal pain, blood in stool, constipation, diarrhea, nausea and vomiting. Genitourinary:  Negative for dysuria, hematuria and urgency. Musculoskeletal:  Negative for back pain, neck pain and neck stiffness. Skin:  Negative for rash and wound. Neurological:  Negative for syncope, weakness, light-headedness and headaches. Hematological:  Negative for adenopathy. Does not bruise/bleed easily. Psychiatric/Behavioral:  Negative for suicidal ideas. The patient is not nervous/anxious.       REVIEWED INFORMATION      Allergies   Allergen Reactions    Influenza Vaccines Anaphylaxis    Potassium Chloride     Amlodipine      Other reaction(s): Unknown    Amoxicillin     Aspirin     Augmentin [Amoxicillin-Pot Clavulanate] Diarrhea    Ciprofibrate     Ciprofloxacin Other (See Comments)    Clavulanic Acid     Cozaar [Losartan Potassium]     Crestor [Rosuvastatin Calcium]     Doxycycline

## 2023-11-27 ENCOUNTER — OFFICE VISIT (OUTPATIENT)
Age: 83
End: 2023-11-27

## 2023-11-27 VITALS
SYSTOLIC BLOOD PRESSURE: 126 MMHG | RESPIRATION RATE: 16 BRPM | OXYGEN SATURATION: 96 % | HEIGHT: 64 IN | WEIGHT: 140.8 LBS | BODY MASS INDEX: 24.04 KG/M2 | HEART RATE: 50 BPM | TEMPERATURE: 98.2 F | DIASTOLIC BLOOD PRESSURE: 82 MMHG

## 2023-11-27 DIAGNOSIS — G40.909 SEIZURE DISORDER (HCC): ICD-10-CM

## 2023-11-27 DIAGNOSIS — R53.83 OTHER FATIGUE: ICD-10-CM

## 2023-11-27 DIAGNOSIS — M10.9 GOUT, UNSPECIFIED CAUSE, UNSPECIFIED CHRONICITY, UNSPECIFIED SITE: Primary | ICD-10-CM

## 2023-11-27 DIAGNOSIS — H65.02 NON-RECURRENT ACUTE SEROUS OTITIS MEDIA OF LEFT EAR: ICD-10-CM

## 2023-11-27 DIAGNOSIS — D68.51 FACTOR 5 LEIDEN MUTATION, HETEROZYGOUS (HCC): ICD-10-CM

## 2023-11-27 DIAGNOSIS — I73.9 PVD (PERIPHERAL VASCULAR DISEASE) (HCC): ICD-10-CM

## 2023-11-27 RX ORDER — ALLOPURINOL 300 MG/1
300 TABLET ORAL DAILY
Qty: 90 TABLET | Refills: 0 | Status: SHIPPED | OUTPATIENT
Start: 2023-11-27

## 2023-11-27 RX ORDER — ALLOPURINOL 300 MG/1
300 TABLET ORAL DAILY
Qty: 90 TABLET | Refills: 0 | OUTPATIENT
Start: 2023-11-27

## 2023-11-27 NOTE — TELEPHONE ENCOUNTER
Manda Doll is calling to request a refill on the following medication(s):    Medication Request:  Requested Prescriptions     Pending Prescriptions Disp Refills    allopurinol (ZYLOPRIM) 300 MG tablet [Pharmacy Med Name: ALLOPURINOL 300 MG TABLET] 90 tablet 0     Sig: TAKE 1 TABLET BY MOUTH DAILY       Last Visit Date (If Applicable):  10/5/2023    Next Visit Date:    11/27/2023

## 2024-01-18 ENCOUNTER — TELEPHONE (OUTPATIENT)
Age: 84
End: 2024-01-18

## 2024-01-18 RX ORDER — FLUCONAZOLE 150 MG/1
150 TABLET ORAL
Qty: 2 TABLET | Refills: 0 | Status: SHIPPED | OUTPATIENT
Start: 2024-01-18 | End: 2024-01-24

## 2024-01-18 NOTE — TELEPHONE ENCOUNTER
She called to say that she has been taking monostat for a yeast infection but it doesn't seem to be completely gone.  She wants to know if you can call the pills into [facility] in [place].  She wants to know if it can be done soon due to the weather.

## 2024-02-14 RX ORDER — BUDESONIDE AND FORMOTEROL FUMARATE DIHYDRATE 160; 4.5 UG/1; UG/1
2 AEROSOL RESPIRATORY (INHALATION) 2 TIMES DAILY
Qty: 10.2 G | Refills: 3 | Status: SHIPPED | OUTPATIENT
Start: 2024-02-14

## 2024-02-14 NOTE — TELEPHONE ENCOUNTER
Manda Doll is calling to request a refill on the following medication(s):    Medication Request:  Requested Prescriptions     Pending Prescriptions Disp Refills    SYMBICORT 160-4.5 MCG/ACT AERO [Pharmacy Med Name: SYMBICORT 160-4.5 MCG INHALER] 10.2 g      Sig: INHALE 2 PUFFS BY MOUTH TWICE A DAY       Last Visit Date (If Applicable):  Visit date not found    Next Visit Date:    Visit date not found

## 2024-02-28 ENCOUNTER — HOSPITAL ENCOUNTER (OUTPATIENT)
Age: 84
Setting detail: SPECIMEN
Discharge: HOME | End: 2024-02-28

## 2024-02-28 LAB
ALBUMIN SERPL-MCNC: 4.2 G/DL (ref 3.5–5.2)
ALBUMIN/GLOB SERPL: 1.4 {RATIO} (ref 1–2.5)
ALP SERPL-CCNC: 99 U/L (ref 35–104)
ALT SERPL-CCNC: 15 U/L (ref 5–33)
ANION GAP SERPL CALCULATED.3IONS-SCNC: 12 MMOL/L (ref 9–17)
AST SERPL-CCNC: 22 U/L
BILIRUB DIRECT SERPL-MCNC: <0.1 MG/DL
BILIRUB INDIRECT SERPL-MCNC: ABNORMAL MG/DL (ref 0–1)
BILIRUB SERPL-MCNC: 0.3 MG/DL (ref 0.3–1.2)
BUN SERPL-MCNC: 19 MG/DL (ref 8–23)
CALCIUM SERPL-MCNC: 9.9 MG/DL (ref 8.6–10.4)
CHLORIDE SERPL-SCNC: 102 MMOL/L (ref 98–107)
CO2 SERPL-SCNC: 28 MMOL/L (ref 20–31)
CREAT SERPL-MCNC: 0.8 MG/DL (ref 0.5–0.9)
ERYTHROCYTE [DISTWIDTH] IN BLOOD BY AUTOMATED COUNT: 13.5 % (ref 11.8–14.4)
GFR SERPL CREATININE-BSD FRML MDRD: >60 ML/MIN/1.73M2
GLUCOSE SERPL-MCNC: 107 MG/DL (ref 70–99)
HCT VFR BLD AUTO: 41.7 % (ref 36.3–47.1)
HGB BLD-MCNC: 13.2 G/DL (ref 11.9–15.1)
MCH RBC QN AUTO: 31.4 PG (ref 25.2–33.5)
MCHC RBC AUTO-ENTMCNC: 31.7 G/DL (ref 28.4–34.8)
MCV RBC AUTO: 99.3 FL (ref 82.6–102.9)
NRBC BLD-RTO: 0 PER 100 WBC
PLATELET # BLD AUTO: 228 K/UL (ref 138–453)
PMV BLD AUTO: 11 FL (ref 8.1–13.5)
POTASSIUM SERPL-SCNC: 4.5 MMOL/L (ref 3.7–5.3)
PROT SERPL-MCNC: 7.3 G/DL (ref 6.4–8.3)
RBC # BLD AUTO: 4.2 M/UL (ref 3.95–5.11)
SODIUM SERPL-SCNC: 142 MMOL/L (ref 135–144)
T4 FREE SERPL-MCNC: 1.2 NG/DL (ref 0.9–1.7)
TSH SERPL DL<=0.05 MIU/L-ACNC: 3.66 UIU/ML (ref 0.3–5)
WBC OTHER # BLD: 5.4 K/UL (ref 3.5–11.3)

## 2024-03-04 ENCOUNTER — OFFICE VISIT (OUTPATIENT)
Age: 84
End: 2024-03-04
Payer: MEDICARE

## 2024-03-04 VITALS
HEART RATE: 78 BPM | DIASTOLIC BLOOD PRESSURE: 70 MMHG | WEIGHT: 141.4 LBS | SYSTOLIC BLOOD PRESSURE: 122 MMHG | BODY MASS INDEX: 24.27 KG/M2 | TEMPERATURE: 97.5 F | RESPIRATION RATE: 14 BRPM | OXYGEN SATURATION: 98 %

## 2024-03-04 DIAGNOSIS — D68.51 FACTOR 5 LEIDEN MUTATION, HETEROZYGOUS (HCC): ICD-10-CM

## 2024-03-04 DIAGNOSIS — E11.9 DIABETES MELLITUS TYPE 2 IN NONOBESE (HCC): ICD-10-CM

## 2024-03-04 DIAGNOSIS — D68.69 ACQUIRED HYPERCOAGULABLE STATE (HCC): ICD-10-CM

## 2024-03-04 DIAGNOSIS — I50.22 SYSTOLIC CHF, CHRONIC (HCC): ICD-10-CM

## 2024-03-04 DIAGNOSIS — I73.9 PVD (PERIPHERAL VASCULAR DISEASE) (HCC): Primary | ICD-10-CM

## 2024-03-04 DIAGNOSIS — Z92.29 HISTORY OF COUMADIN THERAPY: ICD-10-CM

## 2024-03-04 PROCEDURE — 1123F ACP DISCUSS/DSCN MKR DOCD: CPT | Performed by: FAMILY MEDICINE

## 2024-03-04 PROCEDURE — 3074F SYST BP LT 130 MM HG: CPT | Performed by: FAMILY MEDICINE

## 2024-03-04 PROCEDURE — 99213 OFFICE O/P EST LOW 20 MIN: CPT | Performed by: FAMILY MEDICINE

## 2024-03-04 PROCEDURE — 3078F DIAST BP <80 MM HG: CPT | Performed by: FAMILY MEDICINE

## 2024-03-04 ASSESSMENT — PATIENT HEALTH QUESTIONNAIRE - PHQ9
5. POOR APPETITE OR OVEREATING: 0
10. IF YOU CHECKED OFF ANY PROBLEMS, HOW DIFFICULT HAVE THESE PROBLEMS MADE IT FOR YOU TO DO YOUR WORK, TAKE CARE OF THINGS AT HOME, OR GET ALONG WITH OTHER PEOPLE: 0
7. TROUBLE CONCENTRATING ON THINGS, SUCH AS READING THE NEWSPAPER OR WATCHING TELEVISION: 0
8. MOVING OR SPEAKING SO SLOWLY THAT OTHER PEOPLE COULD HAVE NOTICED. OR THE OPPOSITE, BEING SO FIGETY OR RESTLESS THAT YOU HAVE BEEN MOVING AROUND A LOT MORE THAN USUAL: 0
SUM OF ALL RESPONSES TO PHQ QUESTIONS 1-9: 0
1. LITTLE INTEREST OR PLEASURE IN DOING THINGS: 0
3. TROUBLE FALLING OR STAYING ASLEEP: 0
SUM OF ALL RESPONSES TO PHQ QUESTIONS 1-9: 0
4. FEELING TIRED OR HAVING LITTLE ENERGY: 0
SUM OF ALL RESPONSES TO PHQ QUESTIONS 1-9: 0
SUM OF ALL RESPONSES TO PHQ QUESTIONS 1-9: 0
6. FEELING BAD ABOUT YOURSELF - OR THAT YOU ARE A FAILURE OR HAVE LET YOURSELF OR YOUR FAMILY DOWN: 0
SUM OF ALL RESPONSES TO PHQ9 QUESTIONS 1 & 2: 0
9. THOUGHTS THAT YOU WOULD BE BETTER OFF DEAD, OR OF HURTING YOURSELF: 0
2. FEELING DOWN, DEPRESSED OR HOPELESS: 0

## 2024-03-04 NOTE — PROGRESS NOTES
were quite good.  Neck no masses trachea midline no lymphadenopathy.  Lungs clear.  Heart regular rhythm no murmur gallop or click.  Abdomen soft nontender no pulsatile lesions.  Patient has dependent rubor of the left foot and toes.  Diminished pedal pulses were noted bilaterally.    ASSESSMENT/PLAN     1. PVD (peripheral vascular disease) ([facility])  2. Factor 5 Leiden mutation, heterozygous ([facility])  3. History of Coumadin therapy  4. Acquired hypercoagulable state ([facility])  5. Systolic CHF, chronic ([facility])  6. Diabetes mellitus type 2 in nonobese ([facility])       Medications and labs were reviewed patient will continue walking exercise to improve collateral circulation.  Return here in 3 to 4 months no labs at that time    COMMUNICATION:       Electronically signed by Adrian Connelly MD on 3/4/2024 at 2:49 PM    Portion of this note were dictated using Dragon speech recognition software. It has been reviewed for accuracy, but may contain grammatical and clerical errors.

## 2024-03-09 DIAGNOSIS — M10.9 GOUT, UNSPECIFIED CAUSE, UNSPECIFIED CHRONICITY, UNSPECIFIED SITE: ICD-10-CM

## 2024-03-09 NOTE — TELEPHONE ENCOUNTER
Manda Doll is calling to request a refill on the following medication(s):    Medication Request:  Requested Prescriptions     Pending Prescriptions Disp Refills    allopurinol (ZYLOPRIM) 300 MG tablet [Pharmacy Med Name: ALLOPURINOL 300 MG TABLET] 90 tablet 0     Sig: TAKE 1 TABLET BY MOUTH DAILY       Last Visit Date (If Applicable):  3/4/2024    Next Visit Date:    7/10/2024

## 2024-03-11 RX ORDER — ALLOPURINOL 300 MG/1
300 TABLET ORAL DAILY
Qty: 90 TABLET | Refills: 0 | Status: SHIPPED | OUTPATIENT
Start: 2024-03-11

## 2024-03-23 ENCOUNTER — APPOINTMENT (OUTPATIENT)
Dept: CT IMAGING | Age: 84
DRG: 243 | End: 2024-03-23
Payer: MEDICARE

## 2024-03-23 ENCOUNTER — APPOINTMENT (OUTPATIENT)
Dept: GENERAL RADIOLOGY | Age: 84
DRG: 243 | End: 2024-03-23
Payer: MEDICARE

## 2024-03-23 ENCOUNTER — HOSPITAL ENCOUNTER (INPATIENT)
Age: 84
LOS: 2 days | Discharge: HOME | DRG: 243 | End: 2024-03-26
Attending: EMERGENCY MEDICINE | Admitting: FAMILY MEDICINE
Payer: MEDICARE

## 2024-03-23 DIAGNOSIS — R00.1 BRADYCARDIA: ICD-10-CM

## 2024-03-23 DIAGNOSIS — R55 VASOVAGAL SYNCOPE: ICD-10-CM

## 2024-03-23 DIAGNOSIS — R55 SYNCOPE AND COLLAPSE: Primary | ICD-10-CM

## 2024-03-23 DIAGNOSIS — S00.83XA CONTUSION OF FACE, INITIAL ENCOUNTER: ICD-10-CM
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
## 2024-03-23 DIAGNOSIS — I45.5 SINUS PAUSE: ICD-10-CM

## 2024-03-23 LAB
ANION GAP SERPL CALCULATED.3IONS-SCNC: 11 MMOL/L (ref 9–17)
BASOPHILS # BLD: 0 K/UL (ref 0–0.2)
BASOPHILS NFR BLD: 0 % (ref 0–2)
BUN SERPL-MCNC: 19 MG/DL (ref 8–23)
CALCIUM SERPL-MCNC: 9.4 MG/DL (ref 8.6–10.4)
CHLORIDE SERPL-SCNC: 102 MMOL/L (ref 98–107)
CO2 SERPL-SCNC: 29 MMOL/L (ref 20–31)
CREAT SERPL-MCNC: 1 MG/DL (ref 0.5–0.9)
EOSINOPHIL # BLD: 0.1 K/UL (ref 0–0.4)
EOSINOPHILS RELATIVE PERCENT: 2 % (ref 1–4)
ERYTHROCYTE [DISTWIDTH] IN BLOOD BY AUTOMATED COUNT: 13.9 % (ref 12.5–15.4)
GFR SERPL CREATININE-BSD FRML MDRD: 57 ML/MIN/1.73M2
GLUCOSE SERPL-MCNC: 115 MG/DL (ref 70–99)
HCT VFR BLD AUTO: 36 % (ref 36–46)
HGB BLD-MCNC: 11.9 G/DL (ref 12–16)
INR PPP: 3.2
LYMPHOCYTES NFR BLD: 1.6 K/UL (ref 1–4.8)
LYMPHOCYTES RELATIVE PERCENT: 29 % (ref 24–44)
MAGNESIUM SERPL-MCNC: 2 MG/DL (ref 1.6–2.6)
MCH RBC QN AUTO: 31.4 PG (ref 26–34)
MCHC RBC AUTO-ENTMCNC: 32.9 G/DL (ref 31–37)
MCV RBC AUTO: 95.4 FL (ref 80–100)
MONOCYTES NFR BLD: 0.7 K/UL (ref 0.1–1.2)
MONOCYTES NFR BLD: 13 % (ref 2–11)
NEUTROPHILS NFR BLD: 56 % (ref 36–66)
NEUTS SEG NFR BLD: 3.2 K/UL (ref 1.8–7.7)
PARTIAL THROMBOPLASTIN TIME: 30.8 SEC (ref 21.3–31.3)
PLATELET # BLD AUTO: 208 K/UL (ref 140–450)
PMV BLD AUTO: 8.5 FL (ref 6–12)
POTASSIUM SERPL-SCNC: 3.9 MMOL/L (ref 3.7–5.3)
PROTHROMBIN TIME: 30.7 SEC (ref 9.4–12.6)
RBC # BLD AUTO: 3.78 M/UL (ref 4–5.2)
SODIUM SERPL-SCNC: 142 MMOL/L (ref 135–144)
TROPONIN I SERPL HS-MCNC: 17 NG/L (ref 0–14)
WBC OTHER # BLD: 5.7 K/UL (ref 3.5–11)

## 2024-03-23 PROCEDURE — 99285 EMERGENCY DEPT VISIT HI MDM: CPT

## 2024-03-23 PROCEDURE — 70450 CT HEAD/BRAIN W/O DYE: CPT

## 2024-03-23 PROCEDURE — 71045 X-RAY EXAM CHEST 1 VIEW: CPT

## 2024-03-23 PROCEDURE — 83735 ASSAY OF MAGNESIUM: CPT

## 2024-03-23 PROCEDURE — 84484 ASSAY OF TROPONIN QUANT: CPT

## 2024-03-23 PROCEDURE — 93005 ELECTROCARDIOGRAM TRACING: CPT | Performed by: EMERGENCY MEDICINE

## 2024-03-23 PROCEDURE — 85610 PROTHROMBIN TIME: CPT

## 2024-03-23 PROCEDURE — 2580000003 HC RX 258: Performed by: EMERGENCY MEDICINE

## 2024-03-23 PROCEDURE — 85730 THROMBOPLASTIN TIME PARTIAL: CPT

## 2024-03-23 PROCEDURE — 72125 CT NECK SPINE W/O DYE: CPT

## 2024-03-23 PROCEDURE — 70486 CT MAXILLOFACIAL W/O DYE: CPT

## 2024-03-23 PROCEDURE — 80048 BASIC METABOLIC PNL TOTAL CA: CPT

## 2024-03-23 PROCEDURE — 36415 COLL VENOUS BLD VENIPUNCTURE: CPT

## 2024-03-23 PROCEDURE — 85025 COMPLETE CBC W/AUTO DIFF WBC: CPT

## 2024-03-23 RX ORDER — 0.9 % SODIUM CHLORIDE 0.9 %
1000 INTRAVENOUS SOLUTION INTRAVENOUS ONCE
Status: COMPLETED | OUTPATIENT
Start: 2024-03-23 | End: 2024-03-24

## 2024-03-23 RX ADMIN — SODIUM CHLORIDE 1000 ML: 9 INJECTION, SOLUTION INTRAVENOUS at 23:50

## 2024-03-23 ASSESSMENT — LIFESTYLE VARIABLES
HOW MANY STANDARD DRINKS CONTAINING ALCOHOL DO YOU HAVE ON A TYPICAL DAY: PATIENT DOES NOT DRINK
HOW OFTEN DO YOU HAVE A DRINK CONTAINING ALCOHOL: NEVER

## 2024-03-23 ASSESSMENT — PAIN - FUNCTIONAL ASSESSMENT
PAIN_FUNCTIONAL_ASSESSMENT: NONE - DENIES PAIN
PAIN_FUNCTIONAL_ASSESSMENT: NONE - DENIES PAIN
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
## 2024-03-24 PROBLEM — R00.1 BRADYCARDIA: Status: ACTIVE | Noted: 2024-03-24

## 2024-03-24 PROBLEM — R55 SYNCOPE AND COLLAPSE: Status: ACTIVE | Noted: 2024-03-24

## 2024-03-24 LAB
BILIRUB UR QL STRIP: NEGATIVE
CLARITY UR: CLEAR
COLOR UR: YELLOW
COMMENT: NORMAL
GLUCOSE BLD-MCNC: 106 MG/DL (ref 65–105)
GLUCOSE UR STRIP-MCNC: NEGATIVE MG/DL
HGB UR QL STRIP.AUTO: NEGATIVE
INR PPP: 2.8
KETONES UR STRIP-MCNC: NEGATIVE MG/DL
LEUKOCYTE ESTERASE UR QL STRIP: NEGATIVE
NITRITE UR QL STRIP: NEGATIVE
PH UR STRIP: 7 [PH] (ref 5–8)
PROT UR STRIP-MCNC: NEGATIVE MG/DL
PROTHROMBIN TIME: 27.3 SEC (ref 9.4–12.6)
SP GR UR STRIP: 1.02 (ref 1–1.03)
UROBILINOGEN UR STRIP-ACNC: NORMAL EU/DL (ref 0–1)

## 2024-03-24 PROCEDURE — 95816 EEG AWAKE AND DROWSY: CPT

## 2024-03-24 PROCEDURE — 85610 PROTHROMBIN TIME: CPT

## 2024-03-24 PROCEDURE — 36415 COLL VENOUS BLD VENIPUNCTURE: CPT

## 2024-03-24 PROCEDURE — 99222 1ST HOSP IP/OBS MODERATE 55: CPT | Performed by: FAMILY MEDICINE

## 2024-03-24 PROCEDURE — 94761 N-INVAS EAR/PLS OXIMETRY MLT: CPT

## 2024-03-24 PROCEDURE — 6370000000 HC RX 637 (ALT 250 FOR IP): Performed by: FAMILY MEDICINE

## 2024-03-24 PROCEDURE — 81003 URINALYSIS AUTO W/O SCOPE: CPT

## 2024-03-24 PROCEDURE — 94640 AIRWAY INHALATION TREATMENT: CPT

## 2024-03-24 PROCEDURE — 2580000003 HC RX 258: Performed by: FAMILY MEDICINE

## 2024-03-24 PROCEDURE — 1200000000 HC SEMI PRIVATE

## 2024-03-24 PROCEDURE — 82947 ASSAY GLUCOSE BLOOD QUANT: CPT

## 2024-03-24 PROCEDURE — 95816 EEG AWAKE AND DROWSY: CPT | Performed by: PSYCHIATRY & NEUROLOGY

## 2024-03-24 RX ORDER — SODIUM CHLORIDE 9 MG/ML
INJECTION, SOLUTION INTRAVENOUS PRN
Status: DISCONTINUED | OUTPATIENT
Start: 2024-03-24 | End: 2024-03-26 | Stop reason: HOSPADM

## 2024-03-24 RX ORDER — ACETAMINOPHEN 325 MG/1
650 TABLET ORAL EVERY 6 HOURS PRN
Status: DISCONTINUED | OUTPATIENT
Start: 2024-03-24 | End: 2024-03-26 | Stop reason: HOSPADM

## 2024-03-24 RX ORDER — POLYETHYLENE GLYCOL 3350 17 G/17G
17 POWDER, FOR SOLUTION ORAL DAILY PRN
Status: DISCONTINUED | OUTPATIENT
Start: 2024-03-24 | End: 2024-03-26 | Stop reason: HOSPADM

## 2024-03-24 RX ORDER — ALLOPURINOL 300 MG/1
300 TABLET ORAL DAILY
Status: DISCONTINUED | OUTPATIENT
Start: 2024-03-24 | End: 2024-03-26 | Stop reason: HOSPADM

## 2024-03-24 RX ORDER — SODIUM CHLORIDE 0.9 % (FLUSH) 0.9 %
5-40 SYRINGE (ML) INJECTION EVERY 12 HOURS SCHEDULED
Status: DISCONTINUED | OUTPATIENT
Start: 2024-03-24 | End: 2024-03-26 | Stop reason: HOSPADM

## 2024-03-24 RX ORDER — ALBUTEROL SULFATE 90 UG/1
1 AEROSOL, METERED RESPIRATORY (INHALATION) EVERY 4 HOURS PRN
Status: DISCONTINUED | OUTPATIENT
Start: 2024-03-24 | End: 2024-03-26 | Stop reason: HOSPADM

## 2024-03-24 RX ORDER — ONDANSETRON 2 MG/ML
4 INJECTION INTRAMUSCULAR; INTRAVENOUS EVERY 6 HOURS PRN
Status: DISCONTINUED | OUTPATIENT
Start: 2024-03-24 | End: 2024-03-26 | Stop reason: HOSPADM

## 2024-03-24 RX ORDER — CLONIDINE HYDROCHLORIDE 0.1 MG/1
0.1 TABLET ORAL ONCE
Status: DISCONTINUED | OUTPATIENT
Start: 2024-03-24 | End: 2024-03-24

## 2024-03-24 RX ORDER — ACETAMINOPHEN 650 MG/1
650 SUPPOSITORY RECTAL EVERY 6 HOURS PRN
Status: DISCONTINUED | OUTPATIENT
Start: 2024-03-24 | End: 2024-03-26 | Stop reason: HOSPADM

## 2024-03-24 RX ORDER — ONDANSETRON 4 MG/1
4 TABLET, ORALLY DISINTEGRATING ORAL EVERY 8 HOURS PRN
Status: DISCONTINUED | OUTPATIENT
Start: 2024-03-24 | End: 2024-03-26 | Stop reason: HOSPADM

## 2024-03-24 RX ORDER — CHOLECALCIFEROL (VITAMIN D3) 125 MCG
1000 CAPSULE ORAL DAILY
Status: DISCONTINUED | OUTPATIENT
Start: 2024-03-24 | End: 2024-03-26 | Stop reason: HOSPADM

## 2024-03-24 RX ORDER — LANOLIN ALCOHOL/MO/W.PET/CERES
400 CREAM (GRAM) TOPICAL DAILY
Status: DISCONTINUED | OUTPATIENT
Start: 2024-03-24 | End: 2024-03-26 | Stop reason: HOSPADM

## 2024-03-24 RX ORDER — HYDRALAZINE HYDROCHLORIDE 50 MG/1
50 TABLET, FILM COATED ORAL EVERY 6 HOURS SCHEDULED
Status: DISCONTINUED | OUTPATIENT
Start: 2024-03-24 | End: 2024-03-26 | Stop reason: HOSPADM

## 2024-03-24 RX ORDER — SODIUM CHLORIDE 9 MG/ML
INJECTION, SOLUTION INTRAVENOUS CONTINUOUS
Status: DISCONTINUED | OUTPATIENT
Start: 2024-03-24 | End: 2024-03-24

## 2024-03-24 RX ORDER — SODIUM CHLORIDE/ALOE VERA
GEL (GRAM) NASAL PRN
Status: DISCONTINUED | OUTPATIENT
Start: 2024-03-24 | End: 2024-03-26 | Stop reason: HOSPADM

## 2024-03-24 RX ORDER — BUDESONIDE AND FORMOTEROL FUMARATE DIHYDRATE 160; 4.5 UG/1; UG/1
2 AEROSOL RESPIRATORY (INHALATION) 2 TIMES DAILY
Status: DISCONTINUED | OUTPATIENT
Start: 2024-03-24 | End: 2024-03-26 | Stop reason: HOSPADM

## 2024-03-24 RX ORDER — HYDRALAZINE HYDROCHLORIDE 25 MG/1
25 TABLET, FILM COATED ORAL EVERY 8 HOURS SCHEDULED
Status: DISCONTINUED | OUTPATIENT
Start: 2024-03-24 | End: 2024-03-24

## 2024-03-24 RX ORDER — LAMOTRIGINE 100 MG/1
100 TABLET ORAL 2 TIMES DAILY
Status: DISCONTINUED | OUTPATIENT
Start: 2024-03-24 | End: 2024-03-26 | Stop reason: HOSPADM

## 2024-03-24 RX ORDER — HYDRALAZINE HYDROCHLORIDE 25 MG/1
50 TABLET, FILM COATED ORAL EVERY 6 HOURS SCHEDULED
Status: DISCONTINUED | OUTPATIENT
Start: 2024-03-24 | End: 2024-03-24

## 2024-03-24 RX ORDER — MAGNESIUM SULFATE IN WATER 40 MG/ML
2000 INJECTION, SOLUTION INTRAVENOUS PRN
Status: DISCONTINUED | OUTPATIENT
Start: 2024-03-24 | End: 2024-03-26 | Stop reason: HOSPADM

## 2024-03-24 RX ORDER — CLONIDINE HYDROCHLORIDE 0.1 MG/1
0.1 TABLET ORAL 3 TIMES DAILY
Status: DISCONTINUED | OUTPATIENT
Start: 2024-03-24 | End: 2024-03-25

## 2024-03-24 RX ORDER — FLUTICASONE PROPIONATE 50 MCG
2 SPRAY, SUSPENSION (ML) NASAL DAILY
Status: DISCONTINUED | OUTPATIENT
Start: 2024-03-24 | End: 2024-03-26 | Stop reason: HOSPADM

## 2024-03-24 RX ORDER — SODIUM CHLORIDE 0.9 % (FLUSH) 0.9 %
5-40 SYRINGE (ML) INJECTION PRN
Status: DISCONTINUED | OUTPATIENT
Start: 2024-03-24 | End: 2024-03-26 | Stop reason: HOSPADM

## 2024-03-24 RX ORDER — M-VIT,TX,IRON,MINS/CALC/FOLIC 27MG-0.4MG
1 TABLET ORAL DAILY
Status: DISCONTINUED | OUTPATIENT
Start: 2024-03-24 | End: 2024-03-26 | Stop reason: HOSPADM

## 2024-03-24 RX ORDER — BISACODYL 5 MG/1
5 TABLET, DELAYED RELEASE ORAL DAILY PRN
Status: DISCONTINUED | OUTPATIENT
Start: 2024-03-24 | End: 2024-03-26 | Stop reason: HOSPADM

## 2024-03-24 RX ADMIN — HYDRALAZINE HYDROCHLORIDE 50 MG: 50 TABLET ORAL at 06:59

## 2024-03-24 RX ADMIN — SODIUM CHLORIDE, PRESERVATIVE FREE 10 ML: 5 INJECTION INTRAVENOUS at 21:14

## 2024-03-24 RX ADMIN — LAMOTRIGINE 100 MG: 100 TABLET ORAL at 21:12

## 2024-03-24 RX ADMIN — ACETAMINOPHEN 650 MG: 325 TABLET ORAL at 04:21

## 2024-03-24 RX ADMIN — Medication 400 MG: at 08:01

## 2024-03-24 RX ADMIN — CYANOCOBALAMIN TAB 500 MCG 1000 MCG: 500 TAB at 09:48

## 2024-03-24 RX ADMIN — HYDRALAZINE HYDROCHLORIDE 50 MG: 50 TABLET ORAL at 23:43

## 2024-03-24 RX ADMIN — SODIUM CHLORIDE: 9 INJECTION, SOLUTION INTRAVENOUS at 15:41

## 2024-03-24 RX ADMIN — ACETAMINOPHEN 650 MG: 325 TABLET ORAL at 15:44

## 2024-03-24 RX ADMIN — SODIUM CHLORIDE: 9 INJECTION, SOLUTION INTRAVENOUS at 04:20

## 2024-03-24 RX ADMIN — LAMOTRIGINE 100 MG: 100 TABLET ORAL at 09:48

## 2024-03-24 RX ADMIN — Medication 1 TABLET: at 08:01

## 2024-03-24 RX ADMIN — SODIUM CHLORIDE, PRESERVATIVE FREE 5 ML: 5 INJECTION INTRAVENOUS at 08:06

## 2024-03-24 RX ADMIN — BUDESONIDE AND FORMOTEROL FUMARATE DIHYDRATE 2 PUFF: 160; 4.5 AEROSOL RESPIRATORY (INHALATION) at 07:49

## 2024-03-24 RX ADMIN — HYDRALAZINE HYDROCHLORIDE 50 MG: 50 TABLET ORAL at 11:10

## 2024-03-24 RX ADMIN — CLONIDINE HYDROCHLORIDE 0.1 MG: 0.1 TABLET ORAL at 21:12

## 2024-03-24 RX ADMIN — BUDESONIDE AND FORMOTEROL FUMARATE DIHYDRATE 2 PUFF: 160; 4.5 AEROSOL RESPIRATORY (INHALATION) at 20:23

## 2024-03-24 RX ADMIN — HYDRALAZINE HYDROCHLORIDE 25 MG: 25 TABLET ORAL at 04:21

## 2024-03-24 RX ADMIN — HYDRALAZINE HYDROCHLORIDE 50 MG: 50 TABLET ORAL at 16:51

## 2024-03-24 RX ADMIN — CLONIDINE HYDROCHLORIDE 0.1 MG: 0.1 TABLET ORAL at 16:51

## 2024-03-24 RX ADMIN — ALLOPURINOL 300 MG: 300 TABLET ORAL at 08:01

## 2024-03-24 ASSESSMENT — PAIN DESCRIPTION - LOCATION
LOCATION: HEAD
LOCATION: HEAD

## 2024-03-24 ASSESSMENT — PAIN SCALES - GENERAL
PAINLEVEL_OUTOF10: 4
PAINLEVEL_OUTOF10: 0
PAINLEVEL_OUTOF10: 2
PAINLEVEL_OUTOF10: 0

## 2024-03-24 ASSESSMENT — PAIN DESCRIPTION - DESCRIPTORS: DESCRIPTORS: ACHING

## 2024-03-24 ASSESSMENT — PAIN - FUNCTIONAL ASSESSMENT
PAIN_FUNCTIONAL_ASSESSMENT: NONE - DENIES PAIN
PAIN_FUNCTIONAL_ASSESSMENT: NONE - DENIES PAIN
PAIN_FUNCTIONAL_ASSESSMENT: ACTIVITIES ARE NOT PREVENTED

## 2024-03-24 NOTE — ED PROVIDER NOTES
labetalol 100 mg twice daily, Lamictal 100 mg in the morning 150 mg at night.  Briviact 75 mg twice daily Plavix 75 mg in the morning allopurinol 300 mg in the morning Symbicort twice daily albuterol as needed Claritin 10 mg, a probiotic.  Warfarin 2 mg daily changes with INR.  Lipitor 20 mg Monday Wednesdays and Fridays    Patient remains hemodynamically stable.  CT of the brain, neck and facial bones does not show anything traumatic and acute.  Patient has remained hemodynamically stable.  She was discussed with Dr. Estrada for admission.      FINAL IMPRESSION      1. Syncope and collapse    2. Bradycardia    3. Contusion of face, initial encounter          DISPOSITION/PLAN   DISPOSITION Admitted 03/24/2024 01:47:48 AM        CONDITION ON DISPOSITION: STABLE       PATIENT REFERRED TO:  No follow-up provider specified.    DISCHARGE MEDICATIONS:  New Prescriptions    No medications on file       (Please note that portions of this note were completed with a voice recognition program.  Efforts were made to edit the dictations but occasionally words are mis-transcribed.  Additionally, portions of this note may also include information that was incorporated after care transfer to another provider that were not available at the time of my evaluation.  Some of this information could likely include laboratory values, vital sign updates, medications etc.)    Tigist Jacobs DO   Attending Emergency Physician        Tigist Jacobs DO  03/24/24 [id number]

## 2024-03-24 NOTE — PLAN OF CARE
Problem: Discharge Planning  Goal: Discharge to home or other facility with appropriate resources  Outcome: Progressing  Flowsheets (Taken 3/24/2024 0323)  Discharge to home or other facility with appropriate resources:   Identify barriers to discharge with patient and caregiver   Arrange for needed discharge resources and transportation as appropriate     Problem: Safety - Adult  Goal: Free from fall injury  Outcome: Progressing     Problem: ABCDS Injury Assessment  Goal: Absence of physical injury  Outcome: Progressing     Problem: Pain  Goal: Verbalizes/displays adequate comfort level or baseline comfort level  Outcome: Progressing

## 2024-03-24 NOTE — PLAN OF CARE
Problem: Discharge Planning  Goal: Discharge to home or other facility with appropriate resources  3/24/2024 0617 by Elver Alarcon RN  Outcome: Progressing  3/24/2024 0617 by Elver Alarcon RN  Outcome: Progressing  Flowsheets (Taken 3/24/2024 0323)  Discharge to home or other facility with appropriate resources:   Identify barriers to discharge with patient and caregiver   Arrange for needed discharge resources and transportation as appropriate     Problem: Safety - Adult  Goal: Free from fall injury  3/24/2024 0617 by Elver Alarcon RN  Outcome: Progressing  3/24/2024 0617 by Elver Alarcon RN  Outcome: Progressing     Problem: ABCDS Injury Assessment  Goal: Absence of physical injury  3/24/2024 0617 by Elver Alarcon RN  Outcome: Progressing  3/24/2024 0617 by Elver Alarcon RN  Outcome: Progressing     Problem: Pain  Goal: Verbalizes/displays adequate comfort level or baseline comfort level  3/24/2024 0617 by Elver Alarcon RN  Outcome: Progressing  3/24/2024 0617 by Elver Alarcon RN  Outcome: Progressing

## 2024-03-24 NOTE — RT PROTOCOL NOTE
RT Inhaler-Nebulizer Bronchodilator Protocol Note    There is a bronchodilator order in the chart from a provider indicating to follow the RT Bronchodilator Protocol and there is an “Initiate RT Inhaler-Nebulizer Bronchodilator Protocol” order as well (see protocol at bottom of note).    CXR Findings:  XR CHEST PORTABLE    Result Date: 3/24/2024  Bibasilar linear opacities, likely representing atelectasis.       The findings from the last RT Protocol Assessment were as follows:   History Pulmonary Disease: Chronic pulmonary disease  Respiratory Pattern: Regular pattern and RR 12-20 bpm  Breath Sounds: Clear breath sounds  Cough: Strong, spontaneous, non-productive  Indication for Bronchodilator Therapy: On home bronchodilators  Bronchodilator Assessment Score: 2    Aerosolized bronchodilator medication orders have been revised according to the RT Inhaler-Nebulizer Bronchodilator Protocol below.    Respiratory Therapist to perform RT Therapy Protocol Assessment initially then follow the protocol.  Repeat RT Therapy Protocol Assessment PRN for score 0-3 or on second treatment, BID, and PRN for scores above 3.    No Indications - adjust the frequency to every 6 hours PRN wheezing or bronchospasm, if no treatments needed after 48 hours then discontinue using Per Protocol order mode.     If indication present, adjust the RT bronchodilator orders based on the Bronchodilator Assessment Score as indicated below.  Use Inhaler orders unless patient has one or more of the following: on home nebulizer, not able to hold breath for 10 seconds, is not alert and oriented, cannot activate and use MDI correctly, or respiratory rate 25 breaths per minute or more, then use the equivalent nebulizer order(s) with same Frequency and PRN reasons based on the score.  If a patient is on this medication at home then do not decrease Frequency below that used at home.    0-3 - enter or revise RT bronchodilator order(s) to equivalent RT

## 2024-03-24 NOTE — PROCEDURES
EEG REPORT       Patient: Manda Doll Age: 83 y.o.  MRN: [id number]      Referring Provider: No ref. provider found    History: This routine 30 minute scalp EEG was recorded with video- monitoring for a 83 y.o.. female who presented with encephalopathy. This EEG was performed to evaluate for focal and epileptiform abnormalities.     Manda Doll   Current Facility-Administered Medications   Medication Dose Route Frequency Provider Last Rate Last Admin    sodium chloride flush 0.9 % injection 5-40 mL  5-40 mL IntraVENous 2 times per day Siders, Paloma L, DO   5 mL at 03/24/24 0806    sodium chloride flush 0.9 % injection 5-40 mL  5-40 mL IntraVENous PRN Siders, Paloma L, DO        0.9 % sodium chloride infusion   IntraVENous PRN Siders, Paloma L, DO        magnesium sulfate 2000 mg in 50 mL IVPB premix  2,000 mg IntraVENous PRN Siders, Paloma L, DO        ondansetron (ZOFRAN-ODT) disintegrating tablet 4 mg  4 mg Oral Q8H PRN Siders, Paloma L, DO        Or    ondansetron (ZOFRAN) injection 4 mg  4 mg IntraVENous Q6H PRN Siders, Paloma L, DO        polyethylene glycol (GLYCOLAX) packet 17 g  17 g Oral Daily PRN Siders, Paloma L, DO        acetaminophen (TYLENOL) tablet 650 mg  650 mg Oral Q6H PRN Siders, Paloma L, DO   650 mg at 03/24/24 1544    Or    acetaminophen (TYLENOL) suppository 650 mg  650 mg Rectal Q6H PRN Siders, Paloma L, DO        albuterol sulfate HFA (PROVENTIL;VENTOLIN;PROAIR) 108 (90 Base) MCG/ACT inhaler 1 puff  1 puff Inhalation Q4H PRN Siders, Paloma L, DO        allopurinol (ZYLOPRIM) tablet 300 mg  300 mg Oral Daily Siders, Paloma L, DO   300 mg at 03/24/24 0801    brivaracetam (BRIVIACT) tablet 75 mg (Patient Supplied)  75 mg Oral BID Siders, Paloma L, DO   75 mg at 03/24/24 1110    budesonide-formoterol (SYMBICORT) 160-4.5 MCG/ACT inhaler 2 puff  2 puff Inhalation BID Paloma Estrada, DO   2 puff at 03/24/24 0781    fluticasone (FLONASE) 50 MCG/ACT nasal spray 2 spray

## 2024-03-24 NOTE — PLAN OF CARE
Problem: Discharge Planning  Goal: Discharge to home or other facility with appropriate resources  3/24/2024 0855 by Lilian White RN  Outcome: Progressing  3/24/2024 0617 by Elver Alarcon RN  Outcome: Progressing  3/24/2024 0617 by Elver Alarcon RN  Outcome: Progressing  Flowsheets (Taken 3/24/2024 0323)  Discharge to home or other facility with appropriate resources:   Identify barriers to discharge with patient and caregiver   Arrange for needed discharge resources and transportation as appropriate     Problem: Safety - Adult  Goal: Free from fall injury  3/24/2024 0855 by Lilian White RN  Outcome: Progressing  3/24/2024 0617 by Elver Alarcon RN  Outcome: Progressing  3/24/2024 0617 by Elver Alarcon RN  Outcome: Progressing     Problem: ABCDS Injury Assessment  Goal: Absence of physical injury  3/24/2024 0855 by Lilian White RN  Outcome: Progressing  3/24/2024 0617 by Elver Alarcon RN  Outcome: Progressing  3/24/2024 0617 by Elver Alarcon RN  Outcome: Progressing     Problem: Pain  Goal: Verbalizes/displays adequate comfort level or baseline comfort level  3/24/2024 0855 by Lilian White RN  Outcome: Progressing  3/24/2024 0617 by Elver Alarcon RN  Outcome: Progressing  3/24/2024 0617 by Elver Alarcon RN  Outcome: Progressing

## 2024-03-24 NOTE — CONSULTS
[facility] Consult      Name:   Manda Doll :  1940   MRN:   [id number] Gender:   female   PCP:  Adrian Connelly MD Age: 83 y.o.   PCP Fax:  [phone]     Hospital:          [facility]   encounter Date:     24        Reason for Consult: Syncope    HPI: Manda Doll is an 83 y.o. female with a known history of CAD status post stenting to left main LAD, factor V Leiden, subdural hematomas, seizure disorder, ischemic cardiomyopathy that has improved.  Patient is brought to the hospital present time after syncopal episode and a fall.  Patient reports she was in the kitchen and not feeling very well in general.  She states she went to go sit down at the kitchen table and before she knew it she she had passed out.  Her  heard a commotion and came in.  There was no documented seizure disorder at that point in time.    On arrival of EMS she was hypotensive and bradycardic given atropine and brought to the hospital.    Patient has a history of subdural hematomas have been evacuated.  She had subsequent seizure disorder since 2017.  Her last seizure was about 6 months ago.    Patient has EKGs in her chart which suggest she has had his advanced conduction system disease including first-degree AV block, right bundle branch block, left anterior fascicular block.  Sinus bradycardia noted.    EKG on arrival confirms above.    PAST MED/SURG HISTORY:     Past Medical History:   Diagnosis Date    Asthma     CAD (coronary artery disease)     Cancer (HCC)     DVT (deep venous thrombosis) (HCC)     Factor 5 Leiden mutation, heterozygous (HCC)     Gout     Head injury     Hearing loss     Hearing loss     Hx of blood clots     Hyperlipidemia     Hypertension     Melanoma (HCC)     Osteoarthritis     Pulmonary emboli (HCC)     Seizures (HCC)     Tinnitus        Past Surgical History:   Procedure Laterality Date    APPENDECTOMY      BILATERAL OOPHORECTOMY      COLONOSCOPY      COLONOSCOPY

## 2024-03-24 NOTE — ED NOTES
Patient had two falls today. Second fall included hitting her head. Patient has seizure history but denies having a seizure and stated her last seizure was last October. History of bradycardia and factor five. Denies pain at this time but stated she had a headache earlier in the day.

## 2024-03-24 NOTE — ED NOTES
Janna [phone]  Danita [phone]    Pt daughters would like to be added to emergency contact list.

## 2024-03-24 NOTE — H&P
MPV 8.5 6.0 - 12.0 fL    Neutrophils % 56 36 - 66 %    Lymphocytes % 29 24 - 44 %    Monocytes % 13 (H) 2 - 11 %    Eosinophils % 2 1 - 4 %    Basophils % 0 0 - 2 %    Neutrophils Absolute 3.20 1.8 - 7.7 k/uL    Lymphocytes Absolute 1.60 1.0 - 4.8 k/uL    Monocytes Absolute 0.70 0.1 - 1.2 k/uL    Eosinophils Absolute 0.10 0.0 - 0.4 k/uL    Basophils Absolute 0.00 0.0 - 0.2 k/uL   Magnesium    Collection Time: 03/23/24 11:06 PM   Result Value Ref Range    Magnesium 2.0 1.6 - 2.6 mg/dL   Troponin    Collection Time: 03/23/24 11:06 PM   Result Value Ref Range    Troponin, High Sensitivity 17 (H) 0 - 14 ng/L   Protime-INR    Collection Time: 03/23/24 11:06 PM   Result Value Ref Range    Protime 30.7 (H) 9.4 - 12.6 sec    INR 3.2    APTT    Collection Time: 03/23/24 11:06 PM   Result Value Ref Range    APTT 30.8 21.3 - 31.3 sec       Imaging/Diagnostics:  CT CERVICAL SPINE WO CONTRAST    Result Date: 3/24/2024  Mild reversal of the normal cervical lordosis which may be due to muscle spasm or patient positioning. Possibly significant C5-6 left foraminal stenosis.     CT HEAD WO CONTRAST    Result Date: 3/24/2024  1. No evidence of acute calvarial fracture or intracranial hemorrhage. 2. Prior left frontotemporal craniotomy, with a small underlying encephalomalacia. 3. No acute facial fracture is identified. 4. Left maxillary mucosal thickening with small layering fluid, which could represent acute sinusitis in the correct clinical setting.     CT FACIAL BONES WO CONTRAST    Result Date: 3/24/2024  1. No evidence of acute calvarial fracture or intracranial hemorrhage. 2. Prior left frontotemporal craniotomy, with a small underlying encephalomalacia. 3. No acute facial fracture is identified. 4. Left maxillary mucosal thickening with small layering fluid, which could represent acute sinusitis in the correct clinical setting.     XR CHEST PORTABLE    Result Date: 3/24/2024  Bibasilar linear opacities, likely representing

## 2024-03-24 NOTE — CARE COORDINATION
None  Potential Assistance needed at discharge: N/A            Potential DME:    Patient expects to discharge to: House  Plan for transportation at discharge: Family    Financial    Payor: [employer] MEDICARE / Plan: [employer] MEDICARE-ADVANTAGE PPO / Product Type: Medicare /     Does insurance require precert for SNF: Yes    Potential assistance Purchasing Medications: No  Meds-to-Beds request:        [id number] - [place], [place] - [address] - P [phone] - F [phone]  [address] include 234 beds at the [facility] [address]  Phone: [phone] Fax: [phone]      Notes:    Factors facilitating achievement of predicted outcomes: Family support, Cooperative, and Pleasant    Barriers to discharge: Decreased endurance    Additional Case Management Notes:plan to return home with spouse, denies any discharge needs     The Plan for Transition of Care is related to the following treatment goals of Syncope and collapse [R55]  Bradycardia [R00.1]  Contusion of face, initial encounter [S00.83XA]    IF APPLICABLE: The Patient and/or patient representative Manda and her family were provided with a choice of provider and agrees with the discharge plan. Freedom of choice list with basic dialogue that supports the patient's individualized plan of care/goals and shares the quality data associated with the providers was provided to:     Patient Representative Name:       The Patient and/or Patient Representative Agree with the Discharge Plan?      DANIELLE Calixto, LSW  Case Management Department  Ph: [phone] Fax:

## 2024-03-25 ENCOUNTER — APPOINTMENT (OUTPATIENT)
Age: 84
DRG: 243 | End: 2024-03-25
Attending: FAMILY MEDICINE
Payer: MEDICARE

## 2024-03-25 LAB
ABO + RH BLD: NORMAL
ALBUMIN SERPL-MCNC: 3.6 G/DL (ref 3.5–5.2)
ALBUMIN/GLOB SERPL: 1.5 {RATIO} (ref 1–2.5)
ALP SERPL-CCNC: 85 U/L (ref 35–104)
ALT SERPL-CCNC: 12 U/L (ref 5–33)
ANION GAP SERPL CALCULATED.3IONS-SCNC: 9 MMOL/L (ref 9–17)
ARM BAND NUMBER: NORMAL
AST SERPL-CCNC: 18 U/L
BASOPHILS # BLD: 0 K/UL (ref 0–0.2)
BASOPHILS NFR BLD: 1 % (ref 0–2)
BILIRUB SERPL-MCNC: 0.3 MG/DL (ref 0.3–1.2)
BLOOD BANK SAMPLE EXPIRATION: NORMAL
BLOOD GROUP ANTIBODIES SERPL: NEGATIVE
BUN SERPL-MCNC: 19 MG/DL (ref 8–23)
CALCIUM SERPL-MCNC: 9.1 MG/DL (ref 8.6–10.4)
CHLORIDE SERPL-SCNC: 106 MMOL/L (ref 98–107)
CO2 SERPL-SCNC: 25 MMOL/L (ref 20–31)
CREAT SERPL-MCNC: 0.8 MG/DL (ref 0.5–0.9)
ECHO AO ROOT DIAM: 3 CM
ECHO AO ROOT INDEX: 1.74 CM/M2
ECHO AV AREA PEAK VELOCITY: 1.2 CM2
ECHO AV AREA/BSA PEAK VELOCITY: 0.7 CM2/M2
ECHO AV PEAK GRADIENT: 10 MMHG
ECHO AV PEAK VELOCITY: 1.6 M/S
ECHO AV VELOCITY RATIO: 0.69
ECHO BSA: 1.73 M2
ECHO EST RA PRESSURE: 3 MMHG
ECHO LA AREA 2C: 14.1 CM2
ECHO LA AREA 4C: 19.3 CM2
ECHO LA DIAMETER INDEX: 2.03 CM/M2
ECHO LA DIAMETER: 3.5 CM
ECHO LA MAJOR AXIS: 5.6 CM
ECHO LA MINOR AXIS: 5.4 CM
ECHO LA TO AORTIC ROOT RATIO: 1.17
ECHO LA VOL BP: 40 ML (ref 22–52)
ECHO LA VOL MOD A2C: 30 ML (ref 22–52)
ECHO LA VOL MOD A4C: 51 ML (ref 22–52)
ECHO LA VOL/BSA BIPLANE: 23 ML/M2 (ref 16–34)
ECHO LA VOLUME INDEX MOD A2C: 17 ML/M2 (ref 16–34)
ECHO LA VOLUME INDEX MOD A4C: 30 ML/M2 (ref 16–34)
ECHO LV EDV A2C: 79 ML
ECHO LV EDV A4C: 81 ML
ECHO LV EDV INDEX A4C: 47 ML/M2
ECHO LV EDV NDEX A2C: 46 ML/M2
ECHO LV EJECTION FRACTION A2C: 57 %
ECHO LV EJECTION FRACTION A4C: 55 %
ECHO LV EJECTION FRACTION BIPLANE: 56 % (ref 55–100)
ECHO LV ESV A2C: 34 ML
ECHO LV ESV A4C: 37 ML
ECHO LV ESV INDEX A2C: 20 ML/M2
ECHO LV ESV INDEX A4C: 22 ML/M2
ECHO LV FRACTIONAL SHORTENING: 29 % (ref 28–44)
ECHO LV INTERNAL DIMENSION DIASTOLE INDEX: 2.62 CM/M2
ECHO LV INTERNAL DIMENSION DIASTOLIC: 4.5 CM (ref 3.9–5.3)
ECHO LV INTERNAL DIMENSION SYSTOLIC INDEX: 1.86 CM/M2
ECHO LV INTERNAL DIMENSION SYSTOLIC: 3.2 CM
ECHO LV IVSD: 0.9 CM (ref 0.6–0.9)
ECHO LV MASS 2D: 142.9 G (ref 67–162)
ECHO LV MASS INDEX 2D: 83.1 G/M2 (ref 43–95)
ECHO LV POSTERIOR WALL DIASTOLIC: 1 CM (ref 0.6–0.9)
ECHO LV RELATIVE WALL THICKNESS RATIO: 0.44
ECHO LVOT AREA: 1.8 CM2
ECHO LVOT DIAM: 1.5 CM
ECHO LVOT MEAN GRADIENT: 2 MMHG
ECHO LVOT PEAK GRADIENT: 5 MMHG
ECHO LVOT PEAK VELOCITY: 1.1 M/S
ECHO LVOT STROKE VOLUME INDEX: 26 ML/M2
ECHO LVOT SV: 44.7 ML
ECHO LVOT VTI: 25.3 CM
ECHO RIGHT VENTRICULAR SYSTOLIC PRESSURE (RVSP): 28 MMHG
ECHO RV TAPSE: 2.3 CM (ref 1.7–?)
ECHO TV REGURGITANT MAX VELOCITY: 2.5 M/S
ECHO TV REGURGITANT PEAK GRADIENT: 25 MMHG
EKG ATRIAL RATE: 49 BPM
EKG P-R INTERVAL: 264 MS
EKG Q-T INTERVAL: 492 MS
EKG QRS DURATION: 130 MS
EKG QTC CALCULATION (BAZETT): 444 MS
EKG R AXIS: -52 DEGREES
EKG T AXIS: -23 DEGREES
EKG VENTRICULAR RATE: 49 BPM
EOSINOPHIL # BLD: 0.2 K/UL (ref 0–0.4)
EOSINOPHILS RELATIVE PERCENT: 3 % (ref 1–4)
ERYTHROCYTE [DISTWIDTH] IN BLOOD BY AUTOMATED COUNT: 14.5 % (ref 12.5–15.4)
GFR SERPL CREATININE-BSD FRML MDRD: >60 ML/MIN/1.73M2
GLUCOSE SERPL-MCNC: 101 MG/DL (ref 70–99)
HCT VFR BLD AUTO: 34.4 % (ref 36–46)
HGB BLD-MCNC: 11.3 G/DL (ref 12–16)
INR PPP: 2.1
LYMPHOCYTES NFR BLD: 2.1 K/UL (ref 1–4.8)
LYMPHOCYTES RELATIVE PERCENT: 36 % (ref 24–44)
MCH RBC QN AUTO: 31.8 PG (ref 26–34)
MCHC RBC AUTO-ENTMCNC: 32.8 G/DL (ref 31–37)
MCV RBC AUTO: 97 FL (ref 80–100)
MONOCYTES NFR BLD: 0.7 K/UL (ref 0.1–1.2)
MONOCYTES NFR BLD: 12 % (ref 2–11)
NEUTROPHILS NFR BLD: 48 % (ref 36–66)
NEUTS SEG NFR BLD: 2.8 K/UL (ref 1.8–7.7)
PLATELET # BLD AUTO: 195 K/UL (ref 140–450)
PMV BLD AUTO: 8.6 FL (ref 6–12)
POTASSIUM SERPL-SCNC: 3.5 MMOL/L (ref 3.7–5.3)
PROT SERPL-MCNC: 6 G/DL (ref 6.4–8.3)
PROTHROMBIN TIME: 21.2 SEC (ref 9.4–12.6)
RBC # BLD AUTO: 3.55 M/UL (ref 4–5.2)
SODIUM SERPL-SCNC: 140 MMOL/L (ref 135–144)
WBC OTHER # BLD: 5.8 K/UL (ref 3.5–11)

## 2024-03-25 PROCEDURE — 93306 TTE W/DOPPLER COMPLETE: CPT | Performed by: INTERNAL MEDICINE

## 2024-03-25 PROCEDURE — 0JH606Z INSERTION OF PACEMAKER, DUAL CHAMBER INTO CHEST SUBCUTANEOUS TISSUE AND FASCIA, OPEN APPROACH: ICD-10-PCS | Performed by: INTERNAL MEDICINE

## 2024-03-25 PROCEDURE — C1898 LEAD, PMKR, OTHER THAN TRANS: HCPCS | Performed by: INTERNAL MEDICINE

## 2024-03-25 PROCEDURE — 1210000000 HC MED SURG R&B

## 2024-03-25 PROCEDURE — 6370000000 HC RX 637 (ALT 250 FOR IP): Performed by: FAMILY MEDICINE

## 2024-03-25 PROCEDURE — 86850 RBC ANTIBODY SCREEN: CPT

## 2024-03-25 PROCEDURE — C1785 PMKR, DUAL, RATE-RESP: HCPCS | Performed by: INTERNAL MEDICINE

## 2024-03-25 PROCEDURE — 80053 COMPREHEN METABOLIC PANEL: CPT

## 2024-03-25 PROCEDURE — 33208 INSRT HEART PM ATRIAL & VENT: CPT | Performed by: INTERNAL MEDICINE

## 2024-03-25 PROCEDURE — 3E0132A INTRODUCTION OF ANTI-INFECTIVE ENVELOPE INTO SUBCUTANEOUS TISSUE, PERCUTANEOUS APPROACH: ICD-10-PCS | Performed by: INTERNAL MEDICINE

## 2024-03-25 PROCEDURE — 6360000002 HC RX W HCPCS: Performed by: INTERNAL MEDICINE

## 2024-03-25 PROCEDURE — 2580000003 HC RX 258: Performed by: INTERNAL MEDICINE

## 2024-03-25 PROCEDURE — 93306 TTE W/DOPPLER COMPLETE: CPT

## 2024-03-25 PROCEDURE — C1892 INTRO/SHEATH,FIXED,PEEL-AWAY: HCPCS | Performed by: INTERNAL MEDICINE

## 2024-03-25 PROCEDURE — 94760 N-INVAS EAR/PLS OXIMETRY 1: CPT

## 2024-03-25 PROCEDURE — 36415 COLL VENOUS BLD VENIPUNCTURE: CPT

## 2024-03-25 PROCEDURE — 85610 PROTHROMBIN TIME: CPT

## 2024-03-25 PROCEDURE — P9017 PLASMA 1 DONOR FRZ W/IN 8 HR: HCPCS

## 2024-03-25 PROCEDURE — 86900 BLOOD TYPING SEROLOGIC ABO: CPT

## 2024-03-25 PROCEDURE — 02HK3JZ INSERTION OF PACEMAKER LEAD INTO RIGHT VENTRICLE, PERCUTANEOUS APPROACH: ICD-10-PCS | Performed by: INTERNAL MEDICINE

## 2024-03-25 PROCEDURE — 05HY33Z INSERTION OF INFUSION DEVICE INTO UPPER VEIN, PERCUTANEOUS APPROACH: ICD-10-PCS | Performed by: INTERNAL MEDICINE

## 2024-03-25 PROCEDURE — 99232 SBSQ HOSP IP/OBS MODERATE 35: CPT | Performed by: FAMILY MEDICINE

## 2024-03-25 PROCEDURE — 85025 COMPLETE CBC W/AUTO DIFF WBC: CPT

## 2024-03-25 PROCEDURE — 86927 PLASMA FRESH FROZEN: CPT

## 2024-03-25 PROCEDURE — 02H63JZ INSERTION OF PACEMAKER LEAD INTO RIGHT ATRIUM, PERCUTANEOUS APPROACH: ICD-10-PCS | Performed by: INTERNAL MEDICINE

## 2024-03-25 PROCEDURE — 86901 BLOOD TYPING SEROLOGIC RH(D): CPT

## 2024-03-25 PROCEDURE — C1751 CATH, INF, PER/CENT/MIDLINE: HCPCS | Performed by: INTERNAL MEDICINE

## 2024-03-25 PROCEDURE — C1889 IMPLANT/INSERT DEVICE, NOC: HCPCS | Performed by: INTERNAL MEDICINE

## 2024-03-25 DEVICE — IPG W1DR01 AZURE XT DR MRI USA
Type: IMPLANTABLE DEVICE | Status: FUNCTIONAL
Brand: AZURE™ XT DR MRI SURESCAN™

## 2024-03-25 DEVICE — ENVELOPE CMRM6122 ABSORB MED MR
Type: IMPLANTABLE DEVICE | Status: FUNCTIONAL
Brand: TYRX™

## 2024-03-25 DEVICE — LEAD PACE 6FR L52CM RNG ELECTRD DIA2MM PLATINIZED HELIX SIL: Type: IMPLANTABLE DEVICE | Status: FUNCTIONAL

## 2024-03-25 DEVICE — LEAD PACE AD 6FR L58CM VENT SIL PLAT INSUL BPLR PLATINIZED 507658] MEDTRONIC CRM]: Type: IMPLANTABLE DEVICE | Status: FUNCTIONAL

## 2024-03-25 RX ORDER — WARFARIN SODIUM 1 MG/1
2 TABLET ORAL DAILY
Status: DISCONTINUED | OUTPATIENT
Start: 2024-03-25 | End: 2024-03-26 | Stop reason: HOSPADM

## 2024-03-25 RX ORDER — SODIUM CHLORIDE 0.9 % (FLUSH) 0.9 %
5-40 SYRINGE (ML) INJECTION PRN
Status: DISCONTINUED | OUTPATIENT
Start: 2024-03-25 | End: 2024-03-26 | Stop reason: HOSPADM

## 2024-03-25 RX ORDER — SODIUM CHLORIDE 0.9 % (FLUSH) 0.9 %
5-40 SYRINGE (ML) INJECTION EVERY 12 HOURS SCHEDULED
Status: DISCONTINUED | OUTPATIENT
Start: 2024-03-25 | End: 2024-03-26 | Stop reason: HOSPADM

## 2024-03-25 RX ORDER — SODIUM CHLORIDE 9 MG/ML
INJECTION, SOLUTION INTRAVENOUS PRN
Status: DISCONTINUED | OUTPATIENT
Start: 2024-03-25 | End: 2024-03-26 | Stop reason: HOSPADM

## 2024-03-25 RX ORDER — MIDAZOLAM HYDROCHLORIDE 1 MG/ML
INJECTION INTRAMUSCULAR; INTRAVENOUS PRN
Status: DISCONTINUED | OUTPATIENT
Start: 2024-03-25 | End: 2024-03-25 | Stop reason: HOSPADM

## 2024-03-25 RX ADMIN — CYANOCOBALAMIN TAB 500 MCG 1000 MCG: 500 TAB at 08:30

## 2024-03-25 RX ADMIN — WARFARIN SODIUM 2 MG: 1 TABLET ORAL at 21:33

## 2024-03-25 RX ADMIN — FLUTICASONE PROPIONATE 2 SPRAY: 50 SPRAY, METERED NASAL at 08:31

## 2024-03-25 RX ADMIN — HYDRALAZINE HYDROCHLORIDE 50 MG: 50 TABLET ORAL at 13:48

## 2024-03-25 RX ADMIN — ALLOPURINOL 300 MG: 300 TABLET ORAL at 08:30

## 2024-03-25 RX ADMIN — HYDRALAZINE HYDROCHLORIDE 50 MG: 50 TABLET ORAL at 19:42

## 2024-03-25 RX ADMIN — ACETAMINOPHEN 650 MG: 325 TABLET ORAL at 17:45

## 2024-03-25 RX ADMIN — BUDESONIDE AND FORMOTEROL FUMARATE DIHYDRATE 2 PUFF: 160; 4.5 AEROSOL RESPIRATORY (INHALATION) at 09:18

## 2024-03-25 RX ADMIN — LAMOTRIGINE 100 MG: 100 TABLET ORAL at 08:30

## 2024-03-25 RX ADMIN — ACETAMINOPHEN 650 MG: 325 TABLET ORAL at 05:55

## 2024-03-25 RX ADMIN — BUDESONIDE AND FORMOTEROL FUMARATE DIHYDRATE 2 PUFF: 160; 4.5 AEROSOL RESPIRATORY (INHALATION) at 20:16

## 2024-03-25 RX ADMIN — Medication 1 TABLET: at 08:30

## 2024-03-25 RX ADMIN — LAMOTRIGINE 100 MG: 100 TABLET ORAL at 20:48

## 2024-03-25 RX ADMIN — Medication 400 MG: at 08:30

## 2024-03-25 ASSESSMENT — PAIN DESCRIPTION - LOCATION
LOCATION: HEAD
LOCATION: HEAD

## 2024-03-25 ASSESSMENT — PAIN SCALES - GENERAL
PAINLEVEL_OUTOF10: 1
PAINLEVEL_OUTOF10: 2
PAINLEVEL_OUTOF10: 0
PAINLEVEL_OUTOF10: 3
PAINLEVEL_OUTOF10: 0

## 2024-03-25 ASSESSMENT — PAIN DESCRIPTION - DESCRIPTORS: DESCRIPTORS: ACHING

## 2024-03-25 NOTE — PROCEDURES
Patient has small iv access and no way to infuse FFP and Vancomycin at the same time. Multiple attempts at starting iv access were unsuccessful by very experienced staff..    Explained central line/triple lumen catheter to her. Risks, benefits, alternatives, and details discussed extensively. Accepts and consents.    Placed right subclavian central line TLC under full sterile precautions.    Can proceed with pacemaker now.    Thank you for allowing me to participate in the care of this patient, please do not hesitate to call if you have any questions.    Bisi Orozco DO, [facility]  Board Certified Cardiologist  Fellow of the American College of Cardiology     of Medicine [facility]   of Medicine [facility]  [facility]  [phone]

## 2024-03-25 NOTE — CONSULTS
[facility]  Inpatient Cardiology Consult             Date:   3/24/24  Patient name: Manda Doll  Date of admission:  3/23/2024 10:57 PM  MRN:   [id number]  YOB: 1940      Reason for consultation:  Syncope, bradycardia    CHIEF COMPLAINT:  Fainted in the kitchen    History Obtained From:  Patient and medical record    HISTORY OF PRESENT ILLNESS:      The patient is a 83 y.o woman with h/o HTN, HLP, SDH, seizure disorder, DVT/ PE, heterozygous for Factor V Leiden mutation, CAD and PCI, admitted yesterday after a syncopal episode.  She has a longstanding h/o bradycardia and distant h/o syncope, and more recently began to note some intermittent lightheadedness.  She was busy yesterday with housecleaning and guests when she walked into her kitchen and began to feel generally weak prior to brief LOC.  No seizure activity, tongue biting or incontinence was observed by her family, and EMS was called finding hypotension with SBP 70-80 mmHg and bradycardia with HR near 40 bpm, responding to atropine.  Her heart rate has remained near 50 bpm and labetalol was placed on hold.         Past Medical History:   has a past medical history of Asthma, CAD (coronary artery disease), Cancer (HCC), DVT (deep venous thrombosis) (HCC), Factor 5 Leiden mutation, heterozygous (HCC), Gout, Head injury, Hearing loss, Hearing loss, Hx of blood clots, Hyperlipidemia, Hypertension, Melanoma (HCC), Osteoarthritis, Pulmonary emboli (HCC), Seizures (HCC), and Tinnitus.    Past Surgical History:   has a past surgical history that includes Hysterectomy; Tonsillectomy; Colonoscopy; Skin cancer excision (Left, 08/25/2015); craniotomy (08/17/2017); craniotomy (Left, 08/17/2017); Vena Cava Filter Placement (08/18/2017);   picc powerpicc double (08/25/2017); Appendectomy; Hysterectomy, total abdominal; Colonoscopy; Bilateral oophorectomy; and Cranial surgery (08/16/2017).     Home Medications:    Prior to Admission

## 2024-03-25 NOTE — PLAN OF CARE
Problem: Discharge Planning  Goal: Discharge to home or other facility with appropriate resources  Outcome: Progressing  Flowsheets (Taken 3/24/2024 2000)  Discharge to home or other facility with appropriate resources: Identify barriers to discharge with patient and caregiver     Problem: Safety - Adult  Goal: Free from fall injury  Outcome: Progressing  Flowsheets (Taken 3/24/2024 2000)  Free From Fall Injury: Instruct family/caregiver on patient safety     Problem: ABCDS Injury Assessment  Goal: Absence of physical injury  Outcome: Progressing  Flowsheets (Taken 3/24/2024 2000)  Absence of Physical Injury: Implement safety measures based on patient assessment     Problem: Pain  Goal: Verbalizes/displays adequate comfort level or baseline comfort level  Outcome: Progressing

## 2024-03-25 NOTE — CONSENT
Patient needs FFP infusing while pacer being placed to avoid bleeding. After pacer implanted, can go back to normal coumadin dose.    Consent obtained. Risks, benefits, alternatives, and details discussed extensively. Accepts and consents.      Thank you for allowing me to participate in the care of this patient, please do not hesitate to call if you have any questions.    Bisi Orozco DO, [facility]  Board Certified Cardiologist  Fellow of the American College of Cardiology     of Medicine [facility]   of Medicine [facility]  [facility]  [phone]

## 2024-03-26 VITALS
TEMPERATURE: 97.3 F | DIASTOLIC BLOOD PRESSURE: 51 MMHG | BODY MASS INDEX: 23.65 KG/M2 | HEIGHT: 65 IN | WEIGHT: 141.98 LBS | HEART RATE: 66 BPM | SYSTOLIC BLOOD PRESSURE: 135 MMHG | OXYGEN SATURATION: 98 % | RESPIRATION RATE: 18 BRPM

## 2024-03-26 LAB
INR PPP: 1.6
PROTHROMBIN TIME: 16.8 SEC (ref 9.4–12.6)

## 2024-03-26 PROCEDURE — 94761 N-INVAS EAR/PLS OXIMETRY MLT: CPT

## 2024-03-26 PROCEDURE — 36415 COLL VENOUS BLD VENIPUNCTURE: CPT

## 2024-03-26 PROCEDURE — 6370000000 HC RX 637 (ALT 250 FOR IP): Performed by: FAMILY MEDICINE

## 2024-03-26 PROCEDURE — 97535 SELF CARE MNGMENT TRAINING: CPT

## 2024-03-26 PROCEDURE — 97166 OT EVAL MOD COMPLEX 45 MIN: CPT

## 2024-03-26 PROCEDURE — 97116 GAIT TRAINING THERAPY: CPT

## 2024-03-26 PROCEDURE — 94640 AIRWAY INHALATION TREATMENT: CPT

## 2024-03-26 PROCEDURE — 85610 PROTHROMBIN TIME: CPT

## 2024-03-26 PROCEDURE — 2580000003 HC RX 258: Performed by: INTERNAL MEDICINE

## 2024-03-26 PROCEDURE — 97162 PT EVAL MOD COMPLEX 30 MIN: CPT

## 2024-03-26 PROCEDURE — 99239 HOSP IP/OBS DSCHRG MGMT >30: CPT | Performed by: FAMILY MEDICINE

## 2024-03-26 PROCEDURE — 51798 US URINE CAPACITY MEASURE: CPT

## 2024-03-26 RX ORDER — CLOPIDOGREL BISULFATE 75 MG/1
75 TABLET ORAL DAILY
Status: DISCONTINUED | OUTPATIENT
Start: 2024-03-26 | End: 2024-03-26 | Stop reason: HOSPADM

## 2024-03-26 RX ADMIN — CLOPIDOGREL BISULFATE 75 MG: 75 TABLET ORAL at 08:28

## 2024-03-26 RX ADMIN — HYDRALAZINE HYDROCHLORIDE 50 MG: 50 TABLET ORAL at 05:46

## 2024-03-26 RX ADMIN — Medication 1 TABLET: at 08:27

## 2024-03-26 RX ADMIN — BUDESONIDE AND FORMOTEROL FUMARATE DIHYDRATE 2 PUFF: 160; 4.5 AEROSOL RESPIRATORY (INHALATION) at 09:01

## 2024-03-26 RX ADMIN — ALLOPURINOL 300 MG: 300 TABLET ORAL at 08:27

## 2024-03-26 RX ADMIN — CYANOCOBALAMIN TAB 500 MCG 1000 MCG: 500 TAB at 08:28

## 2024-03-26 RX ADMIN — ACETAMINOPHEN 650 MG: 325 TABLET ORAL at 10:48

## 2024-03-26 RX ADMIN — ACETAMINOPHEN 650 MG: 325 TABLET ORAL at 03:04

## 2024-03-26 RX ADMIN — LAMOTRIGINE 100 MG: 100 TABLET ORAL at 08:28

## 2024-03-26 RX ADMIN — Medication 400 MG: at 08:28

## 2024-03-26 RX ADMIN — HYDRALAZINE HYDROCHLORIDE 50 MG: 50 TABLET ORAL at 00:03

## 2024-03-26 RX ADMIN — SODIUM CHLORIDE, PRESERVATIVE FREE 10 ML: 5 INJECTION INTRAVENOUS at 08:28

## 2024-03-26 RX ADMIN — HYDRALAZINE HYDROCHLORIDE 50 MG: 50 TABLET ORAL at 10:48

## 2024-03-26 ASSESSMENT — PAIN DESCRIPTION - LOCATION
LOCATION: ARM
LOCATION: ARM
LOCATION: HEAD

## 2024-03-26 ASSESSMENT — PAIN - FUNCTIONAL ASSESSMENT
PAIN_FUNCTIONAL_ASSESSMENT: ACTIVITIES ARE NOT PREVENTED

## 2024-03-26 ASSESSMENT — PAIN SCALES - GENERAL
PAINLEVEL_OUTOF10: 2
PAINLEVEL_OUTOF10: 4
PAINLEVEL_OUTOF10: 2
PAINLEVEL_OUTOF10: 3

## 2024-03-26 ASSESSMENT — PAIN DESCRIPTION - DESCRIPTORS
DESCRIPTORS: SORE
DESCRIPTORS: SORE
DESCRIPTORS: ACHING

## 2024-03-26 ASSESSMENT — PAIN DESCRIPTION - ORIENTATION: ORIENTATION: LEFT

## 2024-03-26 ASSESSMENT — PAIN DESCRIPTION - PAIN TYPE: TYPE: SURGICAL PAIN

## 2024-03-26 NOTE — DISCHARGE INSTR - DIET

## 2024-03-26 NOTE — PLAN OF CARE
Problem: Safety - Adult  Goal: Free from fall injury  3/26/2024 0029 by Danita Reese, RN  Outcome: Progressing   No falls/injuries this shift, bed in lowest position, brakes on, bed alarm on, call light in reach, side rails up x2  Problem: ABCDS Injury Assessment  Goal: Absence of physical injury  3/26/2024 0029 by Danita Reese, RN  Outcome: Progressing   No falls/injuries this shift, bed in lowest position, brakes on, bed alarm on, call light in reach, side rails up x2  Problem: Pain  Goal: Verbalizes/displays adequate comfort level or baseline comfort level  3/26/2024 0029 by Danita Reese, RN  Outcome: Progressing   No new signs/symptoms of pain noted, pain rating < 3 on scale 0-10, pain controlled with medication/repositioning  Problem: Chronic Conditions and Co-morbidities  Goal: Patient's chronic conditions and co-morbidity symptoms are monitored and maintained or improved  3/26/2024 0029 by Danita Reese, RN  Outcome: Progressing   Pacemaker placed today, pacer pacing, dressing intact

## 2024-03-26 NOTE — DISCHARGE SUMMARY
INR 3.2 2.8 2.1 1.6     Chemistry:  Recent Labs     03/23/24  2306 03/25/24  0557    140   K 3.9 3.5*    106   CO2 29 25   GLUCOSE 115* 101*   BUN 19 19   CREATININE 1.0* 0.8   MG 2.0  --    ANIONGAP 11 9   LABGLOM 57* >60   CALCIUM 9.4 9.1   TROPHS 17*  --      Recent Labs     03/24/24  2341 03/25/24  0557   PROT  --  6.0*   LABALBU  --  3.6   AST  --  18   ALT  --  12   ALKPHOS  --  85   BILITOT  --  0.3   POCGLU 106*  --      ABG:  Lab Results   Component Value Date/Time    POCPH 7.441 08/28/2017 04:53 AM    POCPCO2 44.4 08/28/2017 04:53 AM    POCPO2 102.8 08/28/2017 04:53 AM    POCHCO3 30.2 08/28/2017 04:53 AM    NBEA NOT REPORTED 08/28/2017 04:53 AM    PBEA 6 08/28/2017 04:53 AM    YAN1REL 32 08/28/2017 04:53 AM    WOHP5UFM 98 08/28/2017 04:53 AM    FIO2 40.0 08/28/2017 04:53 AM     Lab Results   Component Value Date/Time    SPECIAL NOT REPORTED 09/10/2017 11:00 AM     Lab Results   Component Value Date/Time    CULTURE KLEBSIELLA PNEUMONIAE >[id number] CFU/ML (A) 09/10/2017 11:00 AM    CULTURE  09/10/2017 11:00 AM     [address] [address] [phone]       Radiology:  CT CERVICAL SPINE WO CONTRAST    Result Date: 3/24/2024  Mild reversal of the normal cervical lordosis which may be due to muscle spasm or patient positioning. Possibly significant C5-6 left foraminal stenosis.     CT HEAD WO CONTRAST    Result Date: 3/24/2024  1. No evidence of acute calvarial fracture or intracranial hemorrhage. 2. Prior left frontotemporal craniotomy, with a small underlying encephalomalacia. 3. No acute facial fracture is identified. 4. Left maxillary mucosal thickening with small layering fluid, which could represent acute sinusitis in the correct clinical setting.     CT FACIAL BONES WO CONTRAST    Result Date: 3/24/2024  1. No evidence of acute calvarial fracture or intracranial hemorrhage. 2. Prior left frontotemporal craniotomy, with a small underlying encephalomalacia. 3. No

## 2024-03-26 NOTE — PROGRESS NOTES
[facility] Kaiden & Nagi Richey M.D., M.H.A.  Antony Lazar M.D., M.B.A.  CLAUDIA Scott P.A.C.    [facility]  [address]    Cardio-Oncology Service  [address], [address]  [place] [address]   [address] [place] [address]  Phone: [phone]   [place] [address]   Phone: [phone]  Fax: [phone]    [phone]   Fax: [phone]          DAILY HOSPITAL PROGRESS NOTE     # DAYS IN HOSPITAL: 1  ADMIT DATE: 3/23/2024        SUBJECTIVE:     Cardiology continuing to follow for syncopal episode, concern for significant bradycardia/sinus pause which would require pacemaker.  Patient was seen by electrophysiology this morning, awaiting their recommendations at this time.    Patient has known history of coronary artery disease with previous stent to left main/LAD.  Known factor V Leyden deficiency, subdural hematomas and a seizure disorder after her hematoma.  Has previous history of ischemic cardiomyopathy with recovery.  She is treated for hypertension and hyperlipidemia.  Patient does have history of advanced conduction system disease including first-degree AV block, right bundle branch block, left anterior fascicular block and sinus bradycardia.    Spoke with patient today in her room.  She has no issues today from a cardiac standpoint.  Denies current chest pain, chest tightness, palpitations, shortness of breath.  No current lightheaded/dizziness, nausea/vomiting.  Denies current headache.  Denies lower extremity edema.  She reports she is waiting recommendations at this time regarding possible pacemaker implant.  She did have breakfast this morning.    Note an EEG was completed yesterday which was abnormal.      ROS  Constitutional: Negative for chills, diaphoresis, weight gain and weight loss.   HENT: Negative for congestion.    Eyes: 
  [facility]  IN-PATIENT SERVICE   [facility] - Location: [place]    Progress Note    3/25/2024    11:01 AM    Name:   Manda Doll  MRN:     [id number]     Acct:      [id number]   Room:   [facility]306-01   Day:  1  Admit Date:  3/23/2024 10:57 PM    PCP:   Adrian Connelly MD  Code Status:  Full Code    Subjective:     No further syncope. She denies CP/SOB.  No N/V.  She had a starring episode yesterday and EEG was ordered.  She ate breakfast and slept ok last night.  Only getting up with help.  Plans for pacemaker.      Medications:     Allergies:    Allergies   Allergen Reactions    Influenza Vaccines Anaphylaxis    Potassium Chloride     Amlodipine      Other reaction(s): Unknown    Amoxicillin     Aspirin     Augmentin [Amoxicillin-Pot Clavulanate] Diarrhea    Ciprofibrate     Ciprofloxacin Other (See Comments)    Clavulanic Acid     Cozaar [Losartan Potassium]     Crestor [Rosuvastatin Calcium]     Doxycycline      Other reaction(s): Unknown      Erythromycin     Gabapentin     Lacosamide     Lipitor [Atorvastatin]     Lisinopril Cough     From cardiology notes in [place]      Losartan     Pitavastatin Other (See Comments)    Simvastatin     Tiotropium Bromide-Olodaterol      Other reaction(s): dizziness    Vytorin [Ezetimibe-Simvastatin]     Adhesive Tape        Current Meds:   Scheduled Meds:    sodium chloride flush  5-40 mL IntraVENous 2 times per day    allopurinol  300 mg Oral Daily    brivaracetam  75 mg Oral BID    budesonide-formoterol  2 puff Inhalation BID    fluticasone  2 spray Each Nostril Daily    lamoTRIgine  100 mg Oral BID    magnesium oxide  400 mg Oral Daily    therapeutic multivitamin-minerals  1 tablet Oral Daily    vitamin B-12  1,000 mcg Oral Daily    hydrALAZINE  50 mg Oral 4 times per day     Continuous Infusions:    sodium chloride      sodium chloride       PRN Meds: sodium chloride, sodium chloride flush, sodium chloride, magnesium sulfate, ondansetron 
1519 - son at bedside reported concern for seizure-like activity. The son stated the pt was mumbling and spaced out.    1520 - RN arrived at bedside. Pt was able to talk in full sentences, however, there was difficulty completing thoughts. /86, HR 72. Dr. Estrada notified. EEG ordered.  
[facility]   Progress Note                   Date:   3/26/2024  Patient name: Manda Doll  Date of admission:  3/23/2024 10:57 PM  MRN:   [id number]  YOB: 1940  PCP: Adrian Connelly MD    Reason for Admission: Syncope and collapse [R55]  Bradycardia [R00.1]  Contusion of face, initial encounter [S00.83XA]    Subjective:       Clinical Changes / Abnormalities: Pt seen and examined in the room. Pt up to chair.  PPM site intact. No drainage,  slight hematoma.        Medications:   Scheduled Meds:   clopidogrel  75 mg Oral Daily    vancomycin (VANCOCIN) 1,000 mg in sodium chloride 0.9 % 1,000 mL irrigation  1,000 mg Irrigation Once    sodium chloride flush  5-40 mL IntraVENous 2 times per day    warfarin  2 mg Oral Daily    sodium chloride flush  5-40 mL IntraVENous 2 times per day    allopurinol  300 mg Oral Daily    brivaracetam  75 mg Oral BID    budesonide-formoterol  2 puff Inhalation BID    fluticasone  2 spray Each Nostril Daily    lamoTRIgine  100 mg Oral BID    magnesium oxide  400 mg Oral Daily    therapeutic multivitamin-minerals  1 tablet Oral Daily    vitamin B-12  1,000 mcg Oral Daily    hydrALAZINE  50 mg Oral 4 times per day     Continuous Infusions:   sodium chloride      sodium chloride 20 mL/hr at 03/26/24 0548    sodium chloride       CBC:   Recent Labs     03/23/24 2306 03/25/24  0557   WBC 5.7 5.8   HGB 11.9* 11.3*    195     BMP:    Recent Labs     03/23/24 2306 03/25/24  0557    140   K 3.9 3.5*    106   CO2 29 25   BUN 19 19   CREATININE 1.0* 0.8   GLUCOSE 115* 101*     Hepatic:   Recent Labs     03/25/24  0557   AST 18   ALT 12   BILITOT 0.3   ALKPHOS 85     Troponin:   Recent Labs     03/23/24 2306   TROPHS 17*     BNP: No results for input(s): \"BNP\" in the last 72 hours.  Lipids: No results for input(s): \"CHOL\", \"HDL\" in the last 72 hours.    Invalid input(s): \"LDLCALCU\"  INR:   Recent Labs     03/24/24  1021 03/25/24  0557 
Consulted Dr. MARY LOU Arnett re: resuming Coumadin.  Dr. MARJORIE Orozco has in post pacer placement note, ok to resume home dose Coumadin.  Order received, 1st dose yet tonight.    
Discharge teaching and instructions completed with patient using teachback method. AVS reviewed. Patient voiced understanding regarding medications, follow up appointments, and care of self at home. Discharged home with son via wheel chair. All questions answered.  
Occupational Therapy  Facility/Department: [address]  Occupational Therapy Initial Assessment    Name: Manda Doll  : 1940  MRN: [id number]  Date of Service: 3/26/2024    Chief Complaint   Patient presents with    Fall     Fall x 2 today.  Per EMS report, pt bradycardic and hypotensive.  Pt given atropine en route.  Pt currently on plavix and eliquis, per pt.     Discharge Recommendations:  Pt will likely not be in need of OT services following discharge  OT Equipment Recommendations  Other: AE/DME recommnedations TBD     Patient Diagnosis(es): The primary encounter diagnosis was Syncope and collapse. Diagnoses of Bradycardia, Contusion of face, initial encounter, and Fall, sequela were also pertinent to this visit.  Past Medical History:  has a past medical history of Asthma, CAD (coronary artery disease), Cancer (HCC), DVT (deep venous thrombosis) (HCC), Factor 5 Leiden mutation, heterozygous (HCC), Gout, Head injury, Hearing loss, Hearing loss, Hx of blood clots, Hyperlipidemia, Hypertension, Melanoma (HCC), Osteoarthritis, Pulmonary emboli (HCC), Seizures (HCC), and Tinnitus.  Past Surgical History:  has a past surgical history that includes Hysterectomy; Tonsillectomy; Colonoscopy; Skin cancer excision (Left, 2015); craniotomy (2017); craniotomy (Left, 2017); Vena Cava Filter Placement (2017);   picc powerpicc double (2017); Appendectomy; Hysterectomy, total abdominal; Colonoscopy; Bilateral oophorectomy; and Cranial surgery (2017).    Assessment   Performance deficits / Impairments: Decreased ADL status;Decreased endurance;Decreased ROM;Decreased strength;Decreased functional mobility ;Decreased coordination;Decreased balance    Assessment: Pt seen for therapy eval s/p admission with bradycardia; pt underwent PPM yesterday, 3/26/24 and is to wear LUE sling for 24 hours following surgery. PLOF includes IND all ADLs and IADLs excluding driving, with no use of 
Patient to cath lab. Report given to KARISSA Curtis for when patient transfers to 339 following pacer placement. All belongings collected and placed in room 339. Family at bedside.   
Patient's systolic blood pressure spikes to 161-170 about 1 hr. Prior to q6h scheduled Hydralizine.   Patient required frequent reminders to keep; L shoulder in sling during the night, yet, continues to try to use her left arm often.  Slight shadowing noted on L chest pacemaker dressing & marked.     
Per patient she saw Dr. Duff this AM  Will await his note/recs  Like not candidate for PPM today due to elevated INR    Nic Orozco DO, FACC, RPVI, SONIA MCCOY  [facility]  [facility]  [phone]    
Physical Therapy        Physical Therapy Cancel Note      DATE: 3/25/2024    NAME: Manda Doll  MRN: [id number]   : 1940      Patient not seen this date for Physical Therapy due to:    Surgery/Procedure: PPM today      Electronically signed by Reshma Stahl PT on 3/25/2024 at 1:06 PM    
Reached out to Dr. Orozco about patient eating breakfast this morning prior to pacemaker insertion this afternoon. Per Dr. Orozco he is fine proceeding with inserting the pacemaker.  
S/p successful dual chamber pacemaker placement. No bleeding.  Finish last part of FFP.  Can go back to original coumadin dosing.  Has known Factor V Leiden and pulm embolism. Can not stop coumadin for 7-10 days.  No bleeding during pacer placement.  Needed central line due to vancomycin, FFP, and IV access for sedation during procedure.  Patient went into Afib while pacer being placed, which gives her another indication for coumadin.      Can check INR in AM.    Follow up with Dr. Antony Lazar. Pacer programmed to follow up with him. His staff was notified with patient information to arrange for follow up.      Thank you for allowing me to participate in the care of this patient, please do not hesitate to call if you have any questions.    Bisi Orozco DO, [facility]  Board Certified Cardiologist  Fellow of the American College of Cardiology     of Medicine [facility]   of Medicine [facility]  [facility]  [phone]           
Spoke with Dr MARJORIE Orozco regarding FFP infusion.  He would like the FFP started when the patient is being transported into the cath lab room so the infusion is running during the procedure.  
Writer messaged Leslie MALLORY with cardiology regarding if CXR needs done today prior to discharge. She responded back no need because it was done yesterday. No new orders.  
Inpatient   How much help is needed turning from your back to your side while in a flat bed without using bedrails?: None  How much help is needed moving from lying on your back to sitting on the side of a flat bed without using bedrails?: A Little  How much help is needed moving to and from a bed to a chair?: A Little  How much help is needed standing up from a chair using your arms?: A Little  How much help is needed walking in hospital room?: A Little  How much help is needed climbing 3-5 steps with a railing?: A Little  AM-PAC Inpatient Mobility Raw Score : 19  AM-PAC Inpatient T-Scale Score : 45.44  Mobility Inpatient CMS 0-100% Score: 41.77  Mobility Inpatient CMS G-Code Modifier : CK         Goals  Short Term Goals  Time Frame for Short Term Goals: 10  Short Term Goal 1: Pt will be modified indep bed mobility.  Short Term Goal 2: Pt will transfer modified indep.  Short Term Goal 3: Pt will be modified indep 300ft without device.  Short Term Goal 4: Pt will go up/down 1 step modified indep.  Patient Goals   Patient Goals : home today and go to family's home for Easter get together on Saturday       Education  Patient Education  Education Given To: Patient  Education Provided: Role of Therapy;Fall Prevention Strategies;Plan of Care;Transfer Training;Precautions  Education Provided Comments: L UE sling s/p PPM precautions and support with pillow in chair for comfort; need to slow down with transition transfers to gait for safety initial discharge home; step training; option standard cane in community for gait at discharge as needed due to L UE sling  Education Method: Demonstration;Verbal;Teach Back  Barriers to Learning: None  Education Outcome: Demonstrated understanding;Verbalized understanding;Continued education needed      Therapy Time   Individual Concurrent Group Co-treatment   Time In 0925         Time Out 1012         Minutes 47         Timed Code Treatment Minutes: 23 Minutes; coeval with OT

## 2024-03-26 NOTE — PLAN OF CARE
Problem: Safety - Adult  Goal: Free from fall injury  3/26/2024 0959 by Edna Graham, RN  Outcome: Progressing  Pt fall risk, fall band present, falling star, safety alarm activated and in use as needed. Hourly rounding performed. Pt encouraged to use call light. See Jamil fall risk assessment.   Problem: Discharge Planning  Goal: Discharge to home or other facility with appropriate resources  3/26/2024 0959 by Edna Graham, RN  Outcome: Progressing  Discharge plans for later today. Patient to work with PT/OT.  Problem: Pain  Goal: Verbalizes/displays adequate comfort level or baseline comfort level  3/26/2024 0959 by Edna Graham, RN  Outcome: Progressing

## 2024-03-26 NOTE — DISCHARGE INSTRUCTIONS
Keep left arm in sling.  Do not position yourself on affected side during rest.  Do not elevate affected arm above shoulder for 30 days    Keep wound clean and dry and no showering for one week

## 2024-03-27 ENCOUNTER — CARE COORDINATION (OUTPATIENT)
Dept: CASE MANAGEMENT | Age: 84
End: 2024-03-27

## 2024-03-27 DIAGNOSIS — R55 SYNCOPE AND COLLAPSE: Primary | ICD-10-CM

## 2024-03-27 LAB
BLOOD BANK BLOOD PRODUCT EXPIRATION DATE: NORMAL
BLOOD BANK BLOOD PRODUCT EXPIRATION DATE: NORMAL
BLOOD BANK DISPENSE STATUS: NORMAL
BLOOD BANK DISPENSE STATUS: NORMAL
BLOOD BANK ISBT PRODUCT BLOOD TYPE: 8400
BLOOD BANK ISBT PRODUCT BLOOD TYPE: 8400
BLOOD BANK PRODUCT CODE: NORMAL
BLOOD BANK PRODUCT CODE: NORMAL
BLOOD BANK UNIT TYPE AND RH: NORMAL
BLOOD BANK UNIT TYPE AND RH: NORMAL
BPU ID: NORMAL
BPU ID: NORMAL
COMPONENT: NORMAL
COMPONENT: NORMAL
ECHO BSA: 1.73 M2
TRANSFUSION STATUS: NORMAL
TRANSFUSION STATUS: NORMAL
UNIT DIVISION: 0
UNIT DIVISION: 0
UNIT ISSUE DATE/TIME: NORMAL
UNIT ISSUE DATE/TIME: NORMAL

## 2024-03-27 PROCEDURE — 1111F DSCHRG MED/CURRENT MED MERGE: CPT | Performed by: FAMILY MEDICINE

## 2024-03-27 NOTE — CARE COORDINATION
Care Transitions Initial Follow Up Call    Call within 2 business days of discharge: Yes    Patient Current Location:  Home: [address] [place] [address] [place] [address]    Care Transition Nurse contacted the spouse/partner by telephone to perform post hospital discharge assessment. Verified name and  with spouse/partner as identifiers. Provided introduction to self, and explanation of the Care Transition Nurse role.     Patient: Manda Doll   Patient : 1940   MRN: [id number]    Reason for Admission: syncope, fall, bradycardia, PPM implant.  Discharge Date: 3/26/24   RARS: Readmission Risk Score: 10.6      Last Discharge Facility       Date Complaint Diagnosis Description Type Department Provider    3/23/24 Fall Syncope and collapse ... ED to Hosp-Admission (Discharged) (ADMITTED) [facility] Estrada, Paloma KUMAR, DO; Reynaldo,...            Was this an external facility discharge? No   Challenges to be reviewed by the provider   Additional needs identified to be addressed with provider: routed request to schedule HFU to clinical pool               Method of communication with provider: chart routing.    3/23-3/26 [facility] admission - syncope, fall, bradycardia, PPM implant.  F/U plan with [facility] EP Dr Daniel ARMIJO + [facility]     Spoke with patient's spouse, Agustin (HIPAA) who answered phone.  Patient is resting.  Agustin reports that patient's biggest issue now is trying to be comfortable with left arm sling applied post pacer implant.  Informed spouse that the main concern is not to lift or elevate affected arm over head.  Denies any complaint of discomfort or swelling at actual pacer site - most of the discomfort is on the periphery due to manipulation.    Spouse received a call from [facility] office to schedule f/u for pacer initial check and wound check.  He is going to contact Edna later today to schedule.  Long term patient follows Dr Sanchez, [facility] EP if any tilt table testing needs to be done in future

## 2024-03-28 ENCOUNTER — TELEPHONE (OUTPATIENT)
Age: 84
End: 2024-03-28

## 2024-03-28 NOTE — TELEPHONE ENCOUNTER
Unable to open last encounter note from Taylor Goss so here was the last message    *[facility] Follow-up     Discharge date: 3/26     Discharge hospital: [facility]     Diagnosis: syncope, PPM implant, josy     Reason patient wasn't scheduled: patient's spouse preferred to speak directly to office staff to schedule     Patient needs: HFU     *Please schedule within 7 days of discharge and contact the patient.    Thanks!   Jaja CAUSEY         Called patient to set up an appointment. No answer, unable to leave a message there is not a voicemail set up.

## 2024-03-29 ENCOUNTER — TELEPHONE (OUTPATIENT)
Age: 84
End: 2024-03-29

## 2024-03-29 NOTE — TELEPHONE ENCOUNTER
Reviewed patient's medications and allergies recommend Septra DS 1 twice daily.  Patient's daughter Janna was informed

## 2024-03-29 NOTE — TELEPHONE ENCOUNTER
Patient had a pace maker put in. Cardiologist wants to put her on ATB to prevent infection    He is thinking doxycycline -  but wanted to check with us to make sure what ATB she can tolerate as she has had C dif in the past    Dr Sanchez will call it in - but need an answer from us to see if this is ok for her    Notify daughter Janna @ [phone]    Needing answer quickly - task printed and gave to Dr CROFT

## 2024-04-03 ENCOUNTER — CARE COORDINATION (OUTPATIENT)
Dept: CASE MANAGEMENT | Age: 84
End: 2024-04-03

## 2024-04-03 NOTE — CARE COORDINATION
Care Transitions Outreach Attempt #1      Attempted to reach patient for transitions of care follow up. Unable to reach patient. HIPAA compliant message left on  requesting a return call.    Patient: Manda Doll Patient : 1940 MRN: [id number]    Last Discharge Facility       Date Complaint Diagnosis Description Type Department Provider    3/23/24 Fall Syncope and collapse ... ED to Hosp-Admission (Discharged) (ADMITTED) [facility] Paloma Sandhu, DO; Reynaldo,...              Was this an external facility discharge? No Discharge Facility: [facility]    Noted following upcoming appointments from discharge chart review:   [facility] follow up appointment(s):   Future Appointments   Date Time Provider Department Center   7/10/2024  1:00 PM [facility] [place] [facility]        Matilda Couch LPN  Care Transition Nurse

## 2024-04-04 ENCOUNTER — TELEPHONE (OUTPATIENT)
Age: 84
End: 2024-04-04

## 2024-04-04 ENCOUNTER — CARE COORDINATION (OUTPATIENT)
Dept: CASE MANAGEMENT | Age: 84
End: 2024-04-04

## 2024-04-04 RX ORDER — CLINDAMYCIN HYDROCHLORIDE 300 MG/1
300 CAPSULE ORAL 2 TIMES DAILY
Qty: 8 CAPSULE | Refills: 0 | Status: SHIPPED | OUTPATIENT
Start: 2024-04-04 | End: 2024-04-08

## 2024-04-04 NOTE — TELEPHONE ENCOUNTER
Patient got her pacemaker in this week from Dr. Sanchez and he put her on bactrim. She is having a bad itchy feeling and red rash marks on her. Dr. Sanchez  told her to call her PCP about this..    Patient wondering if there is something else she can take?? He wanted her on ATB for 14 days so she does not get an infection from her pacemaker.

## 2024-04-04 NOTE — CARE COORDINATION
Care Transitions Outreach Attempt # 2     Call within 2 business days of discharge: Yes   Attempted to reach patient for transitions of care follow up. Unable to reach patient.Left HIPAA compliant VM requesting a return call. Will resolve episode if no return call.     Patient: Manda Doll Patient : 1940 MRN: [id number]    Last Discharge Facility       Date Complaint Diagnosis Description Type Department Provider    3/23/24 Fall Syncope and collapse ... ED to Hosp-Admission (Discharged) (ADMITTED) [facility] Paloma Sandhu, DO; Reynaldo,...              Was this an external facility discharge? No Discharge Facility Name: [facility]     Future Appointments   Date Time Provider Department Center   7/10/2024  1:00 PM Adrian Connelly MD [place] [facility]
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
## 2024-05-06 RX ORDER — WARFARIN SODIUM 2 MG/1
TABLET ORAL
Qty: 90 TABLET | Refills: 1 | Status: SHIPPED | OUTPATIENT
Start: 2024-05-06

## 2024-05-06 NOTE — TELEPHONE ENCOUNTER
Manda Doll is calling to request a refill on the following medication(s):    Medication Request:  Requested Prescriptions     Pending Prescriptions Disp Refills    warfarin (COUMADIN) 2 MG tablet [Pharmacy Med Name: WARFARIN SODIUM 2 MG TABLET] 90 tablet 1     Sig: TAKE 1 TABLET BY MOUTH DAILY       Last Visit Date (If Applicable):  3/4/2024    Next Visit Date:    7/10/2024

## 2024-05-09 RX ORDER — LABETALOL 100 MG/1
100 TABLET, FILM COATED ORAL 2 TIMES DAILY
Qty: 180 TABLET | Refills: 2 | Status: SHIPPED | OUTPATIENT
Start: 2024-05-09

## 2024-05-09 NOTE — TELEPHONE ENCOUNTER
Manda Doll is calling to request a refill on the following medication(s):    Medication Request:  Requested Prescriptions     Pending Prescriptions Disp Refills    labetalol (NORMODYNE) 100 MG tablet [Pharmacy Med Name: LABETALOL  MG TABLET] 180 tablet      Sig: TAKE 1 TABLET BY MOUTH TWICE A DAY       Last Visit Date (If Applicable):  3/4/2024    Next Visit Date:    7/10/2024

## 2024-06-12 ENCOUNTER — TELEPHONE (OUTPATIENT)
Age: 84
End: 2024-06-12

## 2024-06-12 RX ORDER — FUROSEMIDE 20 MG/1
20 TABLET ORAL
Qty: 20 TABLET | Refills: 0 | Status: SHIPPED | OUTPATIENT
Start: 2024-06-13

## 2024-06-12 NOTE — TELEPHONE ENCOUNTER
Patient is calling asking for something that will help with the swelling in her ankles and fingers. The pharmacy is correct in the chart

## 2024-06-15 DIAGNOSIS — M10.9 GOUT, UNSPECIFIED CAUSE, UNSPECIFIED CHRONICITY, UNSPECIFIED SITE: ICD-10-CM

## 2024-06-17 RX ORDER — ALLOPURINOL 300 MG/1
300 TABLET ORAL DAILY
Qty: 90 TABLET | Refills: 3 | Status: SHIPPED | OUTPATIENT
Start: 2024-06-17

## 2024-06-25 ENCOUNTER — OFFICE VISIT (OUTPATIENT)
Age: 84
End: 2024-06-25
Payer: MEDICARE

## 2024-06-25 VITALS — WEIGHT: 141 LBS | BODY MASS INDEX: 23.49 KG/M2 | HEIGHT: 65 IN

## 2024-06-25 DIAGNOSIS — M65.30 TRIGGER FINGER, UNSPECIFIED FINGER, UNSPECIFIED LATERALITY: Primary | ICD-10-CM

## 2024-06-25 PROCEDURE — 1123F ACP DISCUSS/DSCN MKR DOCD: CPT | Performed by: ORTHOPAEDIC SURGERY

## 2024-06-25 PROCEDURE — 20550 NJX 1 TENDON SHEATH/LIGAMENT: CPT | Performed by: ORTHOPAEDIC SURGERY

## 2024-06-25 PROCEDURE — 99204 OFFICE O/P NEW MOD 45 MIN: CPT | Performed by: ORTHOPAEDIC SURGERY

## 2024-06-25 RX ADMIN — LIDOCAINE HYDROCHLORIDE 0.5 ML: 10 INJECTION, SOLUTION INFILTRATION; PERINEURAL at 14:30

## 2024-06-25 RX ADMIN — METHYLPREDNISOLONE ACETATE 40 MG: 80 INJECTION, SUSPENSION INTRA-ARTICULAR; INTRALESIONAL; INTRAMUSCULAR; SOFT TISSUE at 14:31

## 2024-06-25 NOTE — PROGRESS NOTES
[facility]      [facility] PHYSICIANS [facility]  [facility] ORTHOPAEDICS AND SPORTS MEDICINE  [address]  [place] [address]  Dept: [phone]  Dept Fax: [phone]    Chief Compliant:  Chief Complaint   Patient presents with    Hand Pain     Bilateral pain, R hand fingers locking        History of Present Illness:  This is a 84 y.o. female who presents to the clinic today for evaluation / follow up of right middle finger triggering.  She states also states she has pain referred out to the PIP joint.  She is here today for further evaluation.  This is been going on for several months.  She has tried Tylenol.  She is here today for further evaluation.       Physical Exam:    On examination she has tenderness over the A1 pulley of the right long finger.  Her PIP joint can be fully straightened but on her own she is probably 10 degrees shy.  Straighten out does cause her some discomfort.  She can make a full clenched fist but it does trigger today.  Skin is intact PIN strength is decreased secondary to discomfort.    Nursing note and vitals reviewed.     Labs and Imaging:     XR taken today:  No results found.      No orders of the defined types were placed in this encounter.      Assessment and Plan:  No diagnosis found.      This is a 84 y.o. female with right middle finger trigger finger.  I discussed with her trying a steroid injection.  If this does not help they can call back and schedule right middle finger A1 pulley release under local anesthetic.    Consent was obtained. Risks are pain, infection, joint stiffness, and increased blood glucose. The area was prepped with an alcohol swab. I then injected 40 mg of Depo Medrol and lidocaine into the right middle finger A1 pulley. A band-aid was placed over the injection site. The patient tolerated this well. .         Review of Systems   Constitutional: Negative for fever, chills, sweats.

## 2024-06-26 RX ORDER — LIDOCAINE HYDROCHLORIDE 10 MG/ML
0.5 INJECTION, SOLUTION INFILTRATION; PERINEURAL ONCE
Status: COMPLETED | OUTPATIENT
Start: 2024-06-26 | End: 2024-06-25

## 2024-06-26 RX ORDER — METHYLPREDNISOLONE ACETATE 80 MG/ML
40 INJECTION, SUSPENSION INTRA-ARTICULAR; INTRALESIONAL; INTRAMUSCULAR; SOFT TISSUE ONCE
Status: COMPLETED | OUTPATIENT
Start: 2024-06-26 | End: 2024-06-25

## 2024-07-10 ENCOUNTER — OFFICE VISIT (OUTPATIENT)
Age: 84
End: 2024-07-10
Payer: MEDICARE

## 2024-07-10 VITALS
HEART RATE: 86 BPM | HEIGHT: 65 IN | BODY MASS INDEX: 23.66 KG/M2 | WEIGHT: 142 LBS | SYSTOLIC BLOOD PRESSURE: 132 MMHG | DIASTOLIC BLOOD PRESSURE: 88 MMHG | OXYGEN SATURATION: 96 %

## 2024-07-10 DIAGNOSIS — G40.909 SEIZURE DISORDER (HCC): ICD-10-CM

## 2024-07-10 DIAGNOSIS — Z00.00 INITIAL MEDICARE ANNUAL WELLNESS VISIT: Primary | ICD-10-CM

## 2024-07-10 DIAGNOSIS — E78.2 MIXED HYPERLIPIDEMIA: ICD-10-CM

## 2024-07-10 DIAGNOSIS — S80.819A ABRASION, LEG W/O INFECTION: ICD-10-CM

## 2024-07-10 DIAGNOSIS — I73.9 PVD (PERIPHERAL VASCULAR DISEASE) (HCC): ICD-10-CM

## 2024-07-10 DIAGNOSIS — Z92.29 HISTORY OF COUMADIN THERAPY: ICD-10-CM

## 2024-07-10 DIAGNOSIS — R53.83 OTHER FATIGUE: ICD-10-CM

## 2024-07-10 DIAGNOSIS — D68.51 FACTOR 5 LEIDEN MUTATION, HETEROZYGOUS (HCC): ICD-10-CM

## 2024-07-10 PROBLEM — C43.60 MALIGNANT MELANOMA OF UPPER EXTREMITY (HCC): Status: ACTIVE | Noted: 2017-12-22

## 2024-07-10 PROBLEM — Z86.79 HISTORY OF SUBDURAL HEMATOMA: Status: ACTIVE | Noted: 2020-05-21

## 2024-07-10 PROBLEM — Z85.820 HISTORY OF MALIGNANT MELANOMA: Status: ACTIVE | Noted: 2020-05-21

## 2024-07-10 PROBLEM — Z98.890 HISTORY OF CRANIOTOMY: Status: ACTIVE | Noted: 2020-05-21

## 2024-07-10 PROCEDURE — 3079F DIAST BP 80-89 MM HG: CPT | Performed by: FAMILY MEDICINE

## 2024-07-10 PROCEDURE — 1123F ACP DISCUSS/DSCN MKR DOCD: CPT | Performed by: FAMILY MEDICINE

## 2024-07-10 PROCEDURE — G0438 PPPS, INITIAL VISIT: HCPCS | Performed by: FAMILY MEDICINE

## 2024-07-10 PROCEDURE — 3075F SYST BP GE 130 - 139MM HG: CPT | Performed by: FAMILY MEDICINE

## 2024-07-10 RX ORDER — CENOBAMATE 100 MG/1
1 TABLET, FILM COATED ORAL DAILY
COMMUNITY
End: 2024-07-10 | Stop reason: ALTCHOICE

## 2024-07-10 RX ORDER — HYDRALAZINE HYDROCHLORIDE 10 MG/1
TABLET, FILM COATED ORAL
COMMUNITY
End: 2024-07-10 | Stop reason: ALTCHOICE

## 2024-07-10 RX ORDER — SPIRONOLACTONE 25 MG/1
TABLET ORAL
COMMUNITY
End: 2024-07-10 | Stop reason: ALTCHOICE

## 2024-07-10 RX ORDER — CARVEDILOL 12.5 MG/1
TABLET ORAL
COMMUNITY
End: 2024-07-10 | Stop reason: ALTCHOICE

## 2024-07-10 RX ORDER — ATORVASTATIN CALCIUM 20 MG/1
20 TABLET, FILM COATED ORAL
COMMUNITY

## 2024-07-10 RX ORDER — LACOSAMIDE 50 MG/1
TABLET ORAL
COMMUNITY
End: 2024-07-10 | Stop reason: ALTCHOICE

## 2024-07-10 RX ORDER — CLINDAMYCIN HYDROCHLORIDE 300 MG/1
CAPSULE ORAL
COMMUNITY
Start: 2024-04-10

## 2024-07-10 ASSESSMENT — LIFESTYLE VARIABLES
HOW OFTEN DO YOU HAVE A DRINK CONTAINING ALCOHOL: NEVER
HOW MANY STANDARD DRINKS CONTAINING ALCOHOL DO YOU HAVE ON A TYPICAL DAY: PATIENT DOES NOT DRINK

## 2024-07-10 NOTE — PROGRESS NOTES
heterozygous (HCC)  -     CBC; Future  -     Comprehensive Metabolic Panel with Bilirubin; Future  -     T4, Free; Future  -     TSH; Future  PVD (peripheral vascular disease) (HCC)  -     CBC; Future  -     Comprehensive Metabolic Panel with Bilirubin; Future  -     T4, Free; Future  -     TSH; Future  Mixed hyperlipidemia  -     Lipid Panel; Future  Recommendations for Preventive Services Due: see orders and patient instructions/AVS.  Recommended screening schedule for the next 5-10 years is provided to the patient in written form: see Patient Instructions/AVS.     Return in about 4 months (around 11/10/2024).     Subjective       Patient's complete Health Risk Assessment and screening values have been reviewed and are found in Flowsheets. The following problems were reviewed today and where indicated follow up appointments were made and/or referrals ordered.    Positive Risk Factor Screenings with Interventions:       Cognitive:   Clock Drawing Test (CDT): (!) Abnormal  Words recalled: 2 Words Recalled  Total Score: (!) 2  Total Score Interpretation: Abnormal Mini-Cog  Interventions:  Will repeat study after stress has been resolved from recent death of           General HRA Questions:  Select all that apply: (!) Stress    Interventions - Stress:  Patient does have family to help with stress support and things her as best they can be      Inactivity:  On average, how many days per week do you engage in moderate to strenuous exercise (like a brisk walk)?: 0 days (!) Abnormal  On average, how many minutes do you engage in exercise at this level?: 0 min  Interventions - Inactivity:  Patient is as active as she can be at the present time                         Objective   Vitals:    07/10/24 1320   BP: 132/88   Site: Left Upper Arm   Pulse: 86   SpO2: 96%   Weight: 64.4 kg (142 lb)   Height: 1.651 m (5' 5\")      Body mass index is 23.63 kg/m².                    Allergies   Allergen Reactions    Influenza Vaccines

## 2024-07-10 NOTE — PATIENT INSTRUCTIONS
and tests that look at your brain.  These questions and tests can make sure you don't have other conditions that can cause symptoms like MCI. These include depression, sleep problems, and side effects from medicines.  How is it treated?  There are no medicines to treat MCI or to keep it from progressing to dementia. But treating conditions like high blood pressure and diabetes may help. A person with MCI needs routine follow-up visits with their doctor to check on changes in the person's mental skills.  How can you care for yourself at home?  Keeping your body active can help slow MCI. Exercises like walking can help. Try to stay active mentally too. Read or do things like crossword puzzles if you enjoy doing them.  If you need help coping with MCI, you may want to get support from family, friends, a support group, or a counselor who works with people who have MCI.  Though the future isn't always clear, it can be good to plan ahead with instructions for your care. These are called advanced directives. Having a plan can help make sure that you get the care you want.  Current as of: December 20, 2023  Content Version: 14.1  © 4802-5741 [employer].   Care instructions adapted under license by [employer]. If you have questions about a medical condition or this instruction, always ask your healthcare professional. [employer] disclaims any warranty or liability for your use of this information.           Learning About Stress  What is stress?     Stress is your body's response to a hard situation. Your body can have a physical, emotional, or mental response. Stress is a fact of life for most people, and it affects everyone differently. What causes stress for you may not be stressful for someone else.  A lot of things can cause stress. You may feel stress when you go on a job interview, take a test, or run a race. This kind of short-term stress is normal and even useful. It can help you if you

## 2024-07-25 ENCOUNTER — OFFICE VISIT (OUTPATIENT)
Age: 84
End: 2024-07-25
Payer: MEDICARE

## 2024-07-25 ENCOUNTER — TELEPHONE (OUTPATIENT)
Age: 84
End: 2024-07-25

## 2024-07-25 VITALS
BODY MASS INDEX: 23.76 KG/M2 | TEMPERATURE: 98 F | HEIGHT: 65 IN | OXYGEN SATURATION: 95 % | HEART RATE: 64 BPM | WEIGHT: 142.6 LBS | SYSTOLIC BLOOD PRESSURE: 150 MMHG | DIASTOLIC BLOOD PRESSURE: 80 MMHG

## 2024-07-25 DIAGNOSIS — H61.91 SKIN LESION OF RIGHT EAR: Primary | ICD-10-CM

## 2024-07-25 PROCEDURE — 3079F DIAST BP 80-89 MM HG: CPT | Performed by: FAMILY MEDICINE

## 2024-07-25 PROCEDURE — 1123F ACP DISCUSS/DSCN MKR DOCD: CPT | Performed by: FAMILY MEDICINE

## 2024-07-25 PROCEDURE — 99213 OFFICE O/P EST LOW 20 MIN: CPT | Performed by: FAMILY MEDICINE

## 2024-07-25 PROCEDURE — 3077F SYST BP >= 140 MM HG: CPT | Performed by: FAMILY MEDICINE

## 2024-07-25 RX ORDER — BUDESONIDE AND FORMOTEROL FUMARATE DIHYDRATE 160; 4.5 UG/1; UG/1
2 AEROSOL RESPIRATORY (INHALATION) 2 TIMES DAILY
Qty: 10.2 G | Refills: 3 | Status: SHIPPED | OUTPATIENT
Start: 2024-07-25

## 2024-07-25 NOTE — PATIENT INSTRUCTIONS
Manda    Thank you for choosing [facility].  We know you have options when it comes to your healthcare; we appreciate that you chose us. Our goal is to provide exceptional  service and world class care to every patient.  You will be receiving a survey via email or text message asking for your feedback.  Please take a few minutes to share your thoughts about your recent visit. Your comments help us understand what we do well and ways we can improve.  Thank you in advance for your valuable feedback.      Dr. MARJORIE Harmon

## 2024-07-25 NOTE — PROGRESS NOTES
[facility]     Date of Visit:  2024  Patient Name: Manda Doll   Patient :  1940     CHIEF COMPLAINT/HPI:     Manda Doll is a 84 y.o. female who presents today for an general visit to be evaluated for the following condition(s):  Chief Complaint   Patient presents with    Sore on ear     She is here for a sore on the right ear.  It is tender to touch and it hurts when she lays on it.  She has had it for a long time but recently it got sore.        REVIEW OF SYSTEM      Review of Systems  No fever no sweats no chills patient complains of painful skin lesion pinna of right ear    REVIEWED INFORMATION      Allergies   Allergen Reactions    Influenza Vaccines Anaphylaxis    Potassium Chloride     Amlodipine      Other reaction(s): Unknown    Amoxicillin     Aspirin     Augmentin [Amoxicillin-Pot Clavulanate] Diarrhea    Ciprofibrate     Ciprofloxacin Other (See Comments)    Clavulanic Acid     Cozaar [Losartan Potassium]     Crestor [Rosuvastatin Calcium]     Doxycycline      Other reaction(s): Unknown      Erythromycin     Gabapentin     Lacosamide     Lipitor [Atorvastatin]     Lisinopril Cough     From cardiology notes in [place]      Losartan     Pitavastatin Other (See Comments)    Simvastatin     Tiotropium Bromide-Olodaterol      Other reaction(s): dizziness    Vytorin [Ezetimibe-Simvastatin]     Adhesive Tape     Sulfamethoxazole-Trimethoprim Rash       Current Outpatient Medications   Medication Sig Note Dispense Refill    budesonide-formoterol (SYMBICORT) 160-4.5 MCG/ACT AERO Inhale 2 puffs into the lungs 2 times daily  10.2 g 3    atorvastatin (LIPITOR) 20 MG tablet Take 1 tablet by mouth three times a week       Lactobacillus (ACIDOPHILUS/PECTIN PO) Take 1 tablet by mouth       allopurinol (ZYLOPRIM) 300 MG tablet TAKE 1 TABLET BY MOUTH DAILY  90 tablet 3    labetalol (NORMODYNE) 100 MG tablet TAKE 1 TABLET BY MOUTH TWICE A DAY  180 tablet 2    warfarin (COUMADIN) 2 MG

## 2024-07-26 ENCOUNTER — TELEPHONE (OUTPATIENT)
Age: 84
End: 2024-07-26

## 2024-07-26 RX ORDER — MUPIROCIN CALCIUM 20 MG/G
CREAM TOPICAL
Qty: 22 G | Refills: 0 | Status: SHIPPED | OUTPATIENT
Start: 2024-07-26 | End: 2024-08-25

## 2024-07-26 NOTE — TELEPHONE ENCOUNTER
Pt was here yesterday she has sore on her ear  she thought she had some medication left at home she didn't .   Could you refill  Bactroban  cream  Lucio rivera

## 2024-08-19 RX ORDER — CLOPIDOGREL BISULFATE 75 MG/1
75 TABLET ORAL DAILY
Qty: 90 TABLET | Refills: 2 | Status: SHIPPED | OUTPATIENT
Start: 2024-08-19

## 2024-08-19 NOTE — TELEPHONE ENCOUNTER
Manda Doll is calling to request a refill on the following medication(s):    Medication Request:  Requested Prescriptions     Pending Prescriptions Disp Refills    clopidogrel (PLAVIX) 75 MG tablet [Pharmacy Med Name: CLOPIDOGREL 75 MG TABLET] 90 tablet 2     Sig: TAKE 1 TABLET BY MOUTH DAILY       Last Visit Date (If Applicable):  7/25/2024    Next Visit Date:    11/21/2024

## 2024-09-01 ENCOUNTER — HOSPITAL ENCOUNTER (EMERGENCY)
Age: 84
Discharge: HOME | End: 2024-09-01
Attending: EMERGENCY MEDICINE
Payer: MEDICARE

## 2024-09-01 ENCOUNTER — APPOINTMENT (OUTPATIENT)
Dept: GENERAL RADIOLOGY | Age: 84
End: 2024-09-01
Payer: MEDICARE

## 2024-09-01 VITALS
WEIGHT: 140 LBS | RESPIRATION RATE: 20 BRPM | TEMPERATURE: 98.8 F | HEIGHT: 66 IN | BODY MASS INDEX: 22.5 KG/M2 | HEART RATE: 60 BPM | DIASTOLIC BLOOD PRESSURE: 69 MMHG | SYSTOLIC BLOOD PRESSURE: 167 MMHG | OXYGEN SATURATION: 98 %

## 2024-09-01 DIAGNOSIS — I10 ESSENTIAL HYPERTENSION: Primary | ICD-10-CM

## 2024-09-01 LAB
ALBUMIN SERPL-MCNC: 4.3 G/DL (ref 3.5–5.2)
ALBUMIN/GLOB SERPL: 1.3 {RATIO} (ref 1–2.5)
ALP SERPL-CCNC: 110 U/L (ref 35–104)
ALT SERPL-CCNC: 24 U/L (ref 5–33)
ANION GAP SERPL CALCULATED.3IONS-SCNC: 12 MMOL/L (ref 9–17)
AST SERPL-CCNC: 26 U/L
BASOPHILS # BLD: 0 K/UL (ref 0–0.2)
BASOPHILS NFR BLD: 1 % (ref 0–2)
BILIRUB SERPL-MCNC: 0.2 MG/DL (ref 0.3–1.2)
BILIRUB UR QL STRIP: NEGATIVE
BUN SERPL-MCNC: 21 MG/DL (ref 8–23)
CALCIUM SERPL-MCNC: 10.2 MG/DL (ref 8.6–10.4)
CHARACTER UR: ABNORMAL
CHLORIDE SERPL-SCNC: 102 MMOL/L (ref 98–107)
CLARITY UR: CLEAR
CO2 SERPL-SCNC: 27 MMOL/L (ref 20–31)
COLOR UR: YELLOW
CREAT SERPL-MCNC: 0.8 MG/DL (ref 0.5–0.9)
EOSINOPHIL # BLD: 0.2 K/UL (ref 0–0.4)
EOSINOPHILS RELATIVE PERCENT: 3 % (ref 1–4)
EPI CELLS #/AREA URNS HPF: ABNORMAL /HPF (ref 0–5)
ERYTHROCYTE [DISTWIDTH] IN BLOOD BY AUTOMATED COUNT: 14.5 % (ref 12.5–15.4)
GFR, ESTIMATED: 74 ML/MIN/1.73M2
GLUCOSE SERPL-MCNC: 115 MG/DL (ref 70–99)
GLUCOSE UR STRIP-MCNC: NEGATIVE MG/DL
HCT VFR BLD AUTO: 38.6 % (ref 36–46)
HGB BLD-MCNC: 12.9 G/DL (ref 12–16)
HGB UR QL STRIP.AUTO: NEGATIVE
INR PPP: 2
KETONES UR STRIP-MCNC: NEGATIVE MG/DL
LEUKOCYTE ESTERASE UR QL STRIP: ABNORMAL
LYMPHOCYTES NFR BLD: 2.5 K/UL (ref 1–4.8)
LYMPHOCYTES RELATIVE PERCENT: 39 % (ref 24–44)
MCH RBC QN AUTO: 31.8 PG (ref 26–34)
MCHC RBC AUTO-ENTMCNC: 33.4 G/DL (ref 31–37)
MCV RBC AUTO: 95.2 FL (ref 80–100)
MONOCYTES NFR BLD: 0.8 K/UL (ref 0.1–1.2)
MONOCYTES NFR BLD: 13 % (ref 2–11)
NEUTROPHILS NFR BLD: 44 % (ref 36–66)
NEUTS SEG NFR BLD: 2.9 K/UL (ref 1.8–7.7)
NITRITE UR QL STRIP: NEGATIVE
PARTIAL THROMBOPLASTIN TIME: 30.6 SEC (ref 21.3–31.3)
PH UR STRIP: 7 [PH] (ref 5–8)
PLATELET # BLD AUTO: 239 K/UL (ref 140–450)
PMV BLD AUTO: 8.3 FL (ref 6–12)
POTASSIUM SERPL-SCNC: 4.3 MMOL/L (ref 3.7–5.3)
PROT SERPL-MCNC: 7.6 G/DL (ref 6.4–8.3)
PROT UR STRIP-MCNC: NEGATIVE MG/DL
PROTHROMBIN TIME: 20.2 SEC (ref 9.4–12.6)
RBC # BLD AUTO: 4.05 M/UL (ref 4–5.2)
RBC #/AREA URNS HPF: ABNORMAL /HPF (ref 0–2)
SODIUM SERPL-SCNC: 141 MMOL/L (ref 135–144)
SP GR UR STRIP: 1.01 (ref 1–1.03)
TROPONIN I SERPL HS-MCNC: 17 NG/L (ref 0–14)
TROPONIN I SERPL HS-MCNC: 18 NG/L (ref 0–14)
UROBILINOGEN UR STRIP-ACNC: NORMAL EU/DL (ref 0–1)
WBC #/AREA URNS HPF: ABNORMAL /HPF (ref 0–5)
WBC OTHER # BLD: 6.5 K/UL (ref 3.5–11)

## 2024-09-01 PROCEDURE — 81001 URINALYSIS AUTO W/SCOPE: CPT

## 2024-09-01 PROCEDURE — 71045 X-RAY EXAM CHEST 1 VIEW: CPT

## 2024-09-01 PROCEDURE — 93005 ELECTROCARDIOGRAM TRACING: CPT | Performed by: EMERGENCY MEDICINE

## 2024-09-01 PROCEDURE — 99285 EMERGENCY DEPT VISIT HI MDM: CPT

## 2024-09-01 PROCEDURE — 36415 COLL VENOUS BLD VENIPUNCTURE: CPT

## 2024-09-01 PROCEDURE — 85025 COMPLETE CBC W/AUTO DIFF WBC: CPT

## 2024-09-01 PROCEDURE — 84484 ASSAY OF TROPONIN QUANT: CPT

## 2024-09-01 PROCEDURE — 85610 PROTHROMBIN TIME: CPT

## 2024-09-01 PROCEDURE — 85730 THROMBOPLASTIN TIME PARTIAL: CPT

## 2024-09-01 PROCEDURE — 80053 COMPREHEN METABOLIC PANEL: CPT

## 2024-09-01 NOTE — ED PROVIDER NOTES
[facility] Emergency Department  [address]  [place] [address]  Phone: [phone]  Fax: [phone]      Pt Name: Manda Doll  MRN:[id number]  Birthdate 1940  Date of evaluation: 2024      CHIEF COMPLAINT       Chief Complaint   Patient presents with    Hypertension       HISTORY OF PRESENT ILLNESS   Manda Doll is a 84 y.o. female with history of hypertension, factor V Leiden, DVT/PE on Coumadin, subdural hematoma 7 years ago, seizures, and hearing loss who presents for evaluation of elevated blood pressure.  The patient reports that she has noticed that her blood pressure has been elevated over the past 2 days with systolic blood pressure 190-200.  The patient has been compliant with her medications and denies missing any doses.  She has been completely asymptomatic with the elevated blood pressure.  She does admit to eating Chinese food yesterday and having pizza for dinner tonight.  The patient had a small seizure yesterday but her daughters report that this is typical.  She had a slight headache after the seizure but this resolved.  She did not strike her head or lose consciousness.  The patient denies any headaches today.  She states that she has been under a lot of stress over the past 2 months since her  .  The patient denies fever, chills, headache, vision changes, neck pain, back pain, chest pain, shortness of breath, abdominal pain, urinary/bowel symptoms, focal weakness, numbness, tingling, dizziness, lightheadedness, syncope, nausea, vomiting, recent injury or illness.    REVIEW OF SYSTEMS     Positive: Elevated blood pressure  Ten point review of systems was reviewed and is negative unless otherwise noted in the HPI    PAST MEDICAL HISTORY    has a past medical history of Asthma, CAD (coronary artery disease), Cancer (HCC), DVT (deep venous thrombosis) ([facility]), Factor 5 Leiden mutation, heterozygous (HCC), Gout, Head injury, Hearing loss,

## 2024-09-03 LAB
EKG ATRIAL RATE: 63 BPM
EKG P AXIS: -41 DEGREES
EKG P-R INTERVAL: 178 MS
EKG Q-T INTERVAL: 462 MS
EKG QRS DURATION: 164 MS
EKG QTC CALCULATION (BAZETT): 472 MS
EKG R AXIS: -59 DEGREES
EKG T AXIS: 74 DEGREES
EKG VENTRICULAR RATE: 63 BPM

## 2024-09-04 ENCOUNTER — TELEPHONE (OUTPATIENT)
Age: 84
End: 2024-09-04

## 2024-09-04 DIAGNOSIS — F41.9 ANXIETY: Primary | ICD-10-CM

## 2024-09-04 RX ORDER — LORAZEPAM 0.5 MG/1
0.5 TABLET ORAL 3 TIMES DAILY PRN
Qty: 90 TABLET | Refills: 0 | Status: SHIPPED | OUTPATIENT
Start: 2024-09-04 | End: 2024-10-04

## 2024-09-04 NOTE — TELEPHONE ENCOUNTER
Patients daughter, janna, states that patient is having a very hard time with her husbands passing, patient seen cardiology today and her BP has been up, it was 211/111, cardiology raised her BP medication and the cardiologist suggest they talk with PCP about possibly starting her on something that will help her get through this, they think maybe something short term, low dose, something that will not make patient too groggy or out of it as patient is at home nighty by herself now.  They are requesting your thoughts on this and what you suggest.  Lucio H20. Janna can be reached on her cell phone that is noted

## 2024-10-03 ENCOUNTER — OFFICE VISIT (OUTPATIENT)
Age: 84
End: 2024-10-03

## 2024-10-03 VITALS
HEART RATE: 84 BPM | WEIGHT: 145 LBS | BODY MASS INDEX: 24.16 KG/M2 | OXYGEN SATURATION: 100 % | DIASTOLIC BLOOD PRESSURE: 88 MMHG | RESPIRATION RATE: 14 BRPM | HEIGHT: 65 IN | SYSTOLIC BLOOD PRESSURE: 142 MMHG | TEMPERATURE: 98 F

## 2024-10-03 DIAGNOSIS — R05.1 ACUTE COUGH: Primary | ICD-10-CM

## 2024-10-03 RX ORDER — BENZONATATE 100 MG/1
100-200 CAPSULE ORAL 3 TIMES DAILY PRN
Qty: 20 CAPSULE | Refills: 0 | Status: SHIPPED | OUTPATIENT
Start: 2024-10-03 | End: 2024-10-13

## 2024-10-03 RX ORDER — CEPHALEXIN 500 MG/1
500 CAPSULE ORAL 3 TIMES DAILY
Qty: 30 CAPSULE | Refills: 0 | Status: SHIPPED | OUTPATIENT
Start: 2024-10-03 | End: 2024-10-13

## 2024-10-03 RX ORDER — PREDNISONE 20 MG/1
20 TABLET ORAL DAILY
Qty: 5 TABLET | Refills: 0 | Status: SHIPPED | OUTPATIENT
Start: 2024-10-03 | End: 2024-10-08

## 2024-10-03 NOTE — PROGRESS NOTES
Acute upper respiratory infection, unspecified   Notes: Rest and drink more liquids, especially water. You may use a humidifier or vaporizer to help keep the drainage moist. Over-the-counter Nasal Saline may help the stuffy and runny nose. Use Ibuprofen and or Tylenol as needed for fever, chills, body aches or pain. Children 5 years old should not be given over-the-counter cough and cold medications such as guaifenesin and dextromethorphan. If you're over age 5, you may try over-the-counter cold medications such as guaifenesin and dextromethorphan, or multi-symptom cold reliever such as Dayquil to help reduce the symptoms. Follow up with your Primary Care Provider or return to clinic if symptoms do not improve within 3-5 days. If you develop severe symptoms such as shortness of breath, repeated vomiting, coughing up blood, or chest pain you should go to the emergency room or call 911..      Follow Up: 3-5 days if not improving     Disposition and Communications:     I, Yanet Hernández, am scribing for and in the presence of Paloma Estrada DO. 10/3/2024    I, Dr. Paloma Estrada DO, personally performed the services described in this documentation as scribed by Yanet Hernández in my presence and the record is both accurate and complete.

## 2024-10-07 RX ORDER — ATORVASTATIN CALCIUM 20 MG/1
TABLET, FILM COATED ORAL
Qty: 36 TABLET | Refills: 0 | Status: SHIPPED | OUTPATIENT
Start: 2024-10-07

## 2024-10-07 NOTE — TELEPHONE ENCOUNTER
Manda Doll is calling to request a refill on the following medication(s):    Medication Request:  Requested Prescriptions     Pending Prescriptions Disp Refills    atorvastatin (LIPITOR) 20 MG tablet [Pharmacy Med Name: ATORVASTATIN 20 MG TABLET] 36 tablet 0     Sig: TAKE 1 TABLET BY MOUTH DAILY ON MONDAY, WEDNESDAY, AND FRIDAY       Last Visit Date (If Applicable):  7/25/2024    Next Visit Date:    11/21/2024

## 2024-10-08 DIAGNOSIS — R05.1 ACUTE COUGH: ICD-10-CM

## 2024-10-08 RX ORDER — BENZONATATE 100 MG/1
100-200 CAPSULE ORAL 3 TIMES DAILY PRN
Qty: 20 CAPSULE | Refills: 0 | Status: SHIPPED | OUTPATIENT
Start: 2024-10-08 | End: 2024-10-18

## 2024-10-08 NOTE — TELEPHONE ENCOUNTER
Manda Doll is calling to request a refill on the following medication(s):    Medication Request:  Requested Prescriptions     Pending Prescriptions Disp Refills    benzonatate (TESSALON) 100 MG capsule 20 capsule 0     Sig: Take 1-2 capsules by mouth 3 times daily as needed for Cough       Last Visit Date (If Applicable):  7/25/2024    Next Visit Date:    11/21/2024

## 2024-10-15 RX ORDER — LABETALOL 100 MG/1
TABLET, FILM COATED ORAL
Qty: 120 TABLET | Refills: 5 | Status: SHIPPED | OUTPATIENT
Start: 2024-10-15

## 2024-10-15 NOTE — TELEPHONE ENCOUNTER
Patient is calling to get a refill of her labetalol (NORMODYNE) 100 MG tablet and to change the directions of the med to 4 times daily 2 in morning and 2 at night. Sent to the [facility] pharmacy in [place].

## 2024-10-18 DIAGNOSIS — R05.1 ACUTE COUGH: ICD-10-CM

## 2024-10-18 RX ORDER — BENZONATATE 100 MG/1
CAPSULE ORAL
Qty: 20 CAPSULE | Refills: 0 | Status: SHIPPED | OUTPATIENT
Start: 2024-10-18

## 2024-10-18 NOTE — TELEPHONE ENCOUNTER
Madna Doll is calling to request a refill on the following medication(s):    Medication Request:  Requested Prescriptions     Pending Prescriptions Disp Refills    benzonatate (TESSALON) 100 MG capsule [Pharmacy Med Name: BENZONATATE 100 MG CAPSULE] 20 capsule 0     Sig: TAKE ONE TO TWO CAPSULES BY MOUTH THREE TIMES A DAY AS NEEDED FOR COUGH       Last Visit Date (If Applicable):  7/25/2024    Next Visit Date:    11/21/2024

## 2024-10-29 ENCOUNTER — OFFICE VISIT (OUTPATIENT)
Dept: PRIMARY CARE CLINIC | Age: 84
End: 2024-10-29

## 2024-10-29 VITALS
WEIGHT: 145.3 LBS | BODY MASS INDEX: 24.21 KG/M2 | HEIGHT: 65 IN | HEART RATE: 87 BPM | DIASTOLIC BLOOD PRESSURE: 68 MMHG | SYSTOLIC BLOOD PRESSURE: 122 MMHG | OXYGEN SATURATION: 98 %

## 2024-10-29 DIAGNOSIS — J40 BRONCHITIS: Primary | ICD-10-CM

## 2024-10-29 DIAGNOSIS — R05.1 ACUTE COUGH: ICD-10-CM

## 2024-10-29 RX ORDER — ALBUTEROL SULFATE 90 UG/1
2 INHALANT RESPIRATORY (INHALATION) 4 TIMES DAILY PRN
Qty: 18 G | Refills: 0 | Status: SHIPPED | OUTPATIENT
Start: 2024-10-29

## 2024-10-29 RX ORDER — PREDNISONE 20 MG/1
20 TABLET ORAL DAILY
Qty: 5 TABLET | Refills: 0 | Status: SHIPPED | OUTPATIENT
Start: 2024-10-29 | End: 2024-11-03

## 2024-10-29 SDOH — ECONOMIC STABILITY: FOOD INSECURITY: WITHIN THE PAST 12 MONTHS, THE FOOD YOU BOUGHT JUST DIDN'T LAST AND YOU DIDN'T HAVE MONEY TO GET MORE.: NEVER TRUE

## 2024-10-29 SDOH — ECONOMIC STABILITY: INCOME INSECURITY: HOW HARD IS IT FOR YOU TO PAY FOR THE VERY BASICS LIKE FOOD, HOUSING, MEDICAL CARE, AND HEATING?: NOT HARD AT ALL

## 2024-10-29 SDOH — ECONOMIC STABILITY: FOOD INSECURITY: WITHIN THE PAST 12 MONTHS, YOU WORRIED THAT YOUR FOOD WOULD RUN OUT BEFORE YOU GOT MONEY TO BUY MORE.: NEVER TRUE

## 2024-10-29 ASSESSMENT — ENCOUNTER SYMPTOMS
COUGH: 1
VOICE CHANGE: 1
GASTROINTESTINAL NEGATIVE: 1
CHEST TIGHTNESS: 1
SINUS PAIN: 0
SINUS PRESSURE: 0
WHEEZING: 0

## 2024-10-29 NOTE — PROGRESS NOTES
[facility] WALK-IN  [address],  [address]  [place] [address]  Dept: [phone]    Manda Doll is a 84 y.o. female Established patient, who presents to the walk-in clinic today with conditions/complaints as noted below:    Chief Complaint   Patient presents with    Cough    Hoarse    Breathing Problem    Congestion         HPI:     HPI    Nazanin presents to the office today with her daughter with concerns of a cough since Saturday. Staying the same. Denies fever. Denies sick contacts.Saw her PCP 10/3 for a cough. She states she took prednisone and tessalon pearls and was better after that. Taking Claritin every day. Using her Symbicort as prescribed. Rinsing mouth after every use.   Voiding and stooling normally. Eating and drinking normally.             Past Medical History:   Diagnosis Date    Asthma     CAD (coronary artery disease)     Cancer (HCC)     DVT (deep venous thrombosis) (HCC)     Factor 5 Leiden mutation, heterozygous (HCC)     Gout     Head injury     Hearing loss     Hearing loss     Hx of blood clots     Hyperlipidemia     Hypertension     Melanoma (HCC)     Osteoarthritis     Pulmonary emboli (HCC)     Seizures (HCC)     Tinnitus        Current Outpatient Medications   Medication Sig Dispense Refill    albuterol sulfate HFA (VENTOLIN HFA) 108 (90 Base) MCG/ACT inhaler Inhale 2 puffs into the lungs 4 times daily as needed for Wheezing 18 g 0    predniSONE (DELTASONE) 20 MG tablet Take 1 tablet by mouth daily for 5 days 5 tablet 0    benzonatate (TESSALON) 100 MG capsule TAKE ONE TO TWO CAPSULES BY MOUTH THREE TIMES A DAY AS NEEDED FOR COUGH 20 capsule 0    labetalol (NORMODYNE) 100 MG tablet 2 tablets twice daily 120 tablet 5    atorvastatin (LIPITOR) 20 MG tablet TAKE 1 TABLET BY MOUTH DAILY ON MONDAY, WEDNESDAY, AND FRIDAY 36 tablet 0    clopidogrel (PLAVIX) 75 MG tablet TAKE 1 TABLET BY MOUTH DAILY 90

## 2024-11-18 ENCOUNTER — HOSPITAL ENCOUNTER (OUTPATIENT)
Age: 84
Setting detail: SPECIMEN
Discharge: HOME | End: 2024-11-18

## 2024-11-18 DIAGNOSIS — D68.51 FACTOR 5 LEIDEN MUTATION, HETEROZYGOUS (HCC): ICD-10-CM

## 2024-11-18 DIAGNOSIS — I73.9 PVD (PERIPHERAL VASCULAR DISEASE) (HCC): ICD-10-CM

## 2024-11-18 DIAGNOSIS — R53.83 OTHER FATIGUE: ICD-10-CM

## 2024-11-18 DIAGNOSIS — E78.2 MIXED HYPERLIPIDEMIA: ICD-10-CM

## 2024-11-18 DIAGNOSIS — Z92.29 HISTORY OF COUMADIN THERAPY: ICD-10-CM

## 2024-11-18 LAB
ALBUMIN SERPL-MCNC: 4.1 G/DL (ref 3.5–5.2)
ALBUMIN/GLOB SERPL: 1.4 {RATIO} (ref 1–2.5)
ALP SERPL-CCNC: 89 U/L (ref 35–104)
ALT SERPL-CCNC: 19 U/L (ref 10–35)
ANION GAP SERPL CALCULATED.3IONS-SCNC: 10 MMOL/L (ref 9–16)
AST SERPL-CCNC: 25 U/L (ref 10–35)
BILIRUB DIRECT SERPL-MCNC: 0.2 MG/DL (ref 0–0.2)
BILIRUB INDIRECT SERPL-MCNC: 0.1 MG/DL (ref 0–1)
BILIRUB SERPL-MCNC: 0.3 MG/DL (ref 0–1.2)
BUN SERPL-MCNC: 15 MG/DL (ref 8–23)
CALCIUM SERPL-MCNC: 9.5 MG/DL (ref 8.6–10.4)
CHLORIDE SERPL-SCNC: 103 MMOL/L (ref 98–107)
CHOLEST SERPL-MCNC: 198 MG/DL (ref 0–199)
CHOLESTEROL/HDL RATIO: 3.5
CO2 SERPL-SCNC: 28 MMOL/L (ref 20–31)
CREAT SERPL-MCNC: 0.9 MG/DL (ref 0.6–0.9)
ERYTHROCYTE [DISTWIDTH] IN BLOOD BY AUTOMATED COUNT: 13.9 % (ref 11.8–14.4)
GFR, ESTIMATED: 64 ML/MIN/1.73M2
GLUCOSE SERPL-MCNC: 102 MG/DL (ref 74–99)
HCT VFR BLD AUTO: 41.6 % (ref 36.3–47.1)
HDLC SERPL-MCNC: 57 MG/DL
HGB BLD-MCNC: 13.2 G/DL (ref 11.9–15.1)
LDLC SERPL CALC-MCNC: 108 MG/DL (ref 0–100)
MCH RBC QN AUTO: 31.5 PG (ref 25.2–33.5)
MCHC RBC AUTO-ENTMCNC: 31.7 G/DL (ref 28.4–34.8)
MCV RBC AUTO: 99.3 FL (ref 82.6–102.9)
NRBC BLD-RTO: 0 PER 100 WBC
PLATELET # BLD AUTO: 246 K/UL (ref 138–453)
PMV BLD AUTO: 10.7 FL (ref 8.1–13.5)
POTASSIUM SERPL-SCNC: 4.6 MMOL/L (ref 3.7–5.3)
PROT SERPL-MCNC: 7.1 G/DL (ref 6.6–8.7)
RBC # BLD AUTO: 4.19 M/UL (ref 3.95–5.11)
SODIUM SERPL-SCNC: 141 MMOL/L (ref 136–145)
T4 FREE SERPL-MCNC: 1.2 NG/DL (ref 0.9–1.7)
TRIGL SERPL-MCNC: 163 MG/DL
TSH SERPL DL<=0.05 MIU/L-ACNC: 4.11 UIU/ML (ref 0.27–4.2)
VLDLC SERPL CALC-MCNC: 33 MG/DL (ref 1–30)
WBC OTHER # BLD: 5.1 K/UL (ref 3.5–11.3)

## 2024-11-21 ENCOUNTER — OFFICE VISIT (OUTPATIENT)
Age: 84
End: 2024-11-21

## 2024-11-21 VITALS
OXYGEN SATURATION: 96 % | TEMPERATURE: 97.7 F | SYSTOLIC BLOOD PRESSURE: 136 MMHG | WEIGHT: 144.4 LBS | HEART RATE: 63 BPM | BODY MASS INDEX: 24.03 KG/M2 | DIASTOLIC BLOOD PRESSURE: 70 MMHG

## 2024-11-21 DIAGNOSIS — I50.22 SYSTOLIC CHF, CHRONIC (HCC): Chronic | ICD-10-CM

## 2024-11-21 DIAGNOSIS — Z92.29 HISTORY OF COUMADIN THERAPY: Chronic | ICD-10-CM

## 2024-11-21 DIAGNOSIS — D68.51 FACTOR 5 LEIDEN MUTATION, HETEROZYGOUS (HCC): Chronic | ICD-10-CM

## 2024-11-21 DIAGNOSIS — I16.0 HYPERTENSIVE URGENCY: Primary | ICD-10-CM

## 2024-11-21 DIAGNOSIS — C43.60 MALIGNANT MELANOMA OF UPPER EXTREMITY, UNSPECIFIED LATERALITY (HCC): Chronic | ICD-10-CM

## 2024-11-21 PROBLEM — S06.5XAA: Status: RESOLVED | Noted: 2018-05-11 | Resolved: 2024-11-21

## 2024-11-21 PROBLEM — S06.9X9A UNSPECIFIED INTRACRANIAL INJURY WITH LOSS OF CONSCIOUSNESS OF UNSPECIFIED DURATION, INITIAL ENCOUNTER: Status: RESOLVED | Noted: 2018-04-06 | Resolved: 2024-11-21

## 2024-11-21 RX ORDER — LABETALOL 100 MG/1
TABLET, FILM COATED ORAL
Qty: 120 TABLET | Refills: 5
Start: 2024-11-21

## 2024-11-21 RX ORDER — ATORVASTATIN CALCIUM 20 MG/1
TABLET, FILM COATED ORAL
Qty: 40 TABLET | Refills: 11 | Status: SHIPPED | OUTPATIENT
Start: 2024-11-21

## 2024-11-21 NOTE — PROGRESS NOTES
Alcohol use: Not Currently     Alcohol/week: 0.0 standard drinks of alcohol     Comment: occasionally    Drug use: No     Social Determinants of Health     Financial Resource Strain: Low Risk  (10/29/2024)    Overall Financial Resource Strain (CARDIA)     Difficulty of Paying Living Expenses: Not hard at all   Food Insecurity: No Food Insecurity (10/29/2024)    Hunger Vital Sign     Worried About Running Out of Food in the Last Year: Never true     Ran Out of Food in the Last Year: Never true   Transportation Needs: No Transportation Needs (10/29/2024)    PRAPARE - Transportation     Lack of Transportation (Medical): No     Lack of Transportation (Non-Medical): No   Physical Activity: Inactive (7/10/2024)    Exercise Vital Sign     Days of Exercise per Week: 0 days     Minutes of Exercise per Session: 0 min    Received from [facility]   Housing Stability: Unknown (10/29/2024)    Housing Stability Vital Sign     Unable to Pay for Housing in the Last Year: No     Homeless in the Last Year: No        PHYSICAL EXAM     /70   Pulse 63   Temp 97.7 °F (36.5 °C)   Wt 65.5 kg (144 lb 6.4 oz)   SpO2 96%   BMI 24.03 kg/m²    Physical Exam    Alert no distress mood is excellent neck no masses trachea midline chest clear heart regular rhythm on today's exam no significant murmur was detected.  No extrasystoles.  Abdomen soft flat nontender 1+ ankle edema was noted  ASSESSMENT/PLAN     1. Hypertensive urgency  Comments:  Hypertension currently is under fairly good control with current regime reports from cardiology reviewed no changes in medication  The following orders have not been finalized:  Orders:  -     labetalol (NORMODYNE) 100 MG tablet  2. Factor 5 Leiden mutation, heterozygous (HCC)  Comments:  Patient's condition is stable she continues on Plavix no changes are indicated  3. History of Coumadin therapy  Comments:  Coumadin monitored by

## 2024-12-02 DIAGNOSIS — J45.909 ASTHMA, UNSPECIFIED ASTHMA SEVERITY, UNSPECIFIED WHETHER COMPLICATED, UNSPECIFIED WHETHER PERSISTENT: Primary | ICD-10-CM

## 2024-12-02 NOTE — TELEPHONE ENCOUNTER
Patient using symbicort- but not liking the side effects. She gets very shaky and voice is hoarse.  Is there anything else that she could try?  Can NOT use Advair  Needs a spacer for the inhaler    Lucio Gay    Patient aware Dr CROFT not in today - asked if another doctor could help today    Notify patient@ [phone] or daughter Janna @ [phone]

## 2024-12-02 NOTE — TELEPHONE ENCOUNTER
Insurance can be very picky on what inhaler they will cover, if she could check with insurance to see what inhalers they prefer/cover best, that would be helpful otherwise we would just send a prescription for different inhaler and see what the cost is when it is processed by pharmacy.  Whether or not another inhaler may/may not cause the same side effects is unknown, it would be trial and error.  Let me know if she just wants someone to send a prescription or if she wants to check insurance first

## 2024-12-03 NOTE — TELEPHONE ENCOUNTER
Patient called back.  She would like the spacer called into [facility] for now.     Manda Doll is calling to request a refill on the following medication(s):    Medication Request:  Requested Prescriptions     Pending Prescriptions Disp Refills    Spacer/Aero-Holding Chambers TIMO 1 each 3     Si Device by Does not apply route daily       Last Visit Date (If Applicable):  2024    Next Visit Date:    2025

## 2024-12-04 ENCOUNTER — APPOINTMENT (OUTPATIENT)
Dept: GENERAL RADIOLOGY | Age: 84
End: 2024-12-04
Payer: MEDICARE

## 2024-12-04 ENCOUNTER — HOSPITAL ENCOUNTER (EMERGENCY)
Age: 84
Discharge: HOME | End: 2024-12-04
Attending: STUDENT IN AN ORGANIZED HEALTH CARE EDUCATION/TRAINING PROGRAM
Payer: MEDICARE

## 2024-12-04 VITALS
WEIGHT: 141.09 LBS | TEMPERATURE: 98.6 F | HEART RATE: 64 BPM | OXYGEN SATURATION: 91 % | SYSTOLIC BLOOD PRESSURE: 122 MMHG | HEIGHT: 65 IN | RESPIRATION RATE: 23 BRPM | BODY MASS INDEX: 23.51 KG/M2 | DIASTOLIC BLOOD PRESSURE: 47 MMHG

## 2024-12-04 DIAGNOSIS — J18.9 PNEUMONIA DUE TO INFECTIOUS ORGANISM, UNSPECIFIED LATERALITY, UNSPECIFIED PART OF LUNG: Primary | ICD-10-CM

## 2024-12-04 LAB
ALBUMIN SERPL-MCNC: 4.5 G/DL (ref 3.5–5.2)
ALBUMIN/GLOB SERPL: 1.3 {RATIO} (ref 1–2.5)
ALP SERPL-CCNC: 112 U/L (ref 35–104)
ALT SERPL-CCNC: 19 U/L (ref 5–33)
ANION GAP SERPL CALCULATED.3IONS-SCNC: 13 MMOL/L (ref 9–17)
AST SERPL-CCNC: 23 U/L
BASOPHILS # BLD: 0 K/UL (ref 0–0.2)
BASOPHILS NFR BLD: 0 % (ref 0–2)
BILIRUB SERPL-MCNC: 0.4 MG/DL (ref 0.3–1.2)
BNP SERPL-MCNC: 585 PG/ML
BUN SERPL-MCNC: 21 MG/DL (ref 8–23)
CALCIUM SERPL-MCNC: 10.1 MG/DL (ref 8.6–10.4)
CHLORIDE SERPL-SCNC: 97 MMOL/L (ref 98–107)
CO2 SERPL-SCNC: 27 MMOL/L (ref 20–31)
CREAT SERPL-MCNC: 0.9 MG/DL (ref 0.5–0.9)
EOSINOPHIL # BLD: 0.2 K/UL (ref 0–0.4)
EOSINOPHILS RELATIVE PERCENT: 1 % (ref 1–4)
ERYTHROCYTE [DISTWIDTH] IN BLOOD BY AUTOMATED COUNT: 14.4 % (ref 12.5–15.4)
FLUAV AG SPEC QL: NEGATIVE
FLUBV AG SPEC QL: NEGATIVE
GFR, ESTIMATED: 64 ML/MIN/1.73M2
GLUCOSE SERPL-MCNC: 125 MG/DL (ref 70–99)
HCT VFR BLD AUTO: 40.3 % (ref 36–46)
HGB BLD-MCNC: 13.1 G/DL (ref 12–16)
INR PPP: 3
LACTATE BLDV-SCNC: 1.8 MMOL/L (ref 0.5–1.9)
LYMPHOCYTES NFR BLD: 1.1 K/UL (ref 1–4.8)
LYMPHOCYTES RELATIVE PERCENT: 8 % (ref 24–44)
MAGNESIUM SERPL-MCNC: 1.8 MG/DL (ref 1.6–2.6)
MCH RBC QN AUTO: 31.8 PG (ref 26–34)
MCHC RBC AUTO-ENTMCNC: 32.6 G/DL (ref 31–37)
MCV RBC AUTO: 97.5 FL (ref 80–100)
MONOCYTES NFR BLD: 1 K/UL (ref 0.1–1.2)
MONOCYTES NFR BLD: 7 % (ref 2–11)
NEUTROPHILS NFR BLD: 84 % (ref 36–66)
NEUTS SEG NFR BLD: 11.6 K/UL (ref 1.8–7.7)
PLATELET # BLD AUTO: 242 K/UL (ref 140–450)
PMV BLD AUTO: 8 FL (ref 6–12)
POTASSIUM SERPL-SCNC: 4.5 MMOL/L (ref 3.7–5.3)
PROT SERPL-MCNC: 8 G/DL (ref 6.4–8.3)
PROTHROMBIN TIME: 29.5 SEC (ref 9.4–12.6)
RBC # BLD AUTO: 4.13 M/UL (ref 4–5.2)
RSV BY PCR: NEGATIVE
SARS-COV-2 RDRP RESP QL NAA+PROBE: NOT DETECTED
SODIUM SERPL-SCNC: 137 MMOL/L (ref 135–144)
SPECIMEN DESCRIPTION: NORMAL
SPECIMEN SOURCE: NORMAL
TROPONIN I SERPL HS-MCNC: 20 NG/L (ref 0–14)
TROPONIN I SERPL HS-MCNC: 21 NG/L (ref 0–14)
WBC OTHER # BLD: 13.9 K/UL (ref 3.5–11)

## 2024-12-04 PROCEDURE — 87635 SARS-COV-2 COVID-19 AMP PRB: CPT

## 2024-12-04 PROCEDURE — 94640 AIRWAY INHALATION TREATMENT: CPT

## 2024-12-04 PROCEDURE — 85610 PROTHROMBIN TIME: CPT

## 2024-12-04 PROCEDURE — 71045 X-RAY EXAM CHEST 1 VIEW: CPT

## 2024-12-04 PROCEDURE — 96365 THER/PROPH/DIAG IV INF INIT: CPT

## 2024-12-04 PROCEDURE — 80053 COMPREHEN METABOLIC PANEL: CPT

## 2024-12-04 PROCEDURE — 83735 ASSAY OF MAGNESIUM: CPT

## 2024-12-04 PROCEDURE — 99285 EMERGENCY DEPT VISIT HI MDM: CPT

## 2024-12-04 PROCEDURE — 6370000000 HC RX 637 (ALT 250 FOR IP)

## 2024-12-04 PROCEDURE — 96375 TX/PRO/DX INJ NEW DRUG ADDON: CPT

## 2024-12-04 PROCEDURE — 6360000002 HC RX W HCPCS: Performed by: EMERGENCY MEDICINE

## 2024-12-04 PROCEDURE — 2580000003 HC RX 258: Performed by: EMERGENCY MEDICINE

## 2024-12-04 PROCEDURE — 93005 ELECTROCARDIOGRAM TRACING: CPT | Performed by: EMERGENCY MEDICINE

## 2024-12-04 PROCEDURE — 83605 ASSAY OF LACTIC ACID: CPT

## 2024-12-04 PROCEDURE — 87040 BLOOD CULTURE FOR BACTERIA: CPT

## 2024-12-04 PROCEDURE — 36415 COLL VENOUS BLD VENIPUNCTURE: CPT

## 2024-12-04 PROCEDURE — 87804 INFLUENZA ASSAY W/OPTIC: CPT

## 2024-12-04 PROCEDURE — 84484 ASSAY OF TROPONIN QUANT: CPT

## 2024-12-04 PROCEDURE — 87798 DETECT AGENT NOS DNA AMP: CPT

## 2024-12-04 PROCEDURE — 83880 ASSAY OF NATRIURETIC PEPTIDE: CPT

## 2024-12-04 PROCEDURE — 94761 N-INVAS EAR/PLS OXIMETRY MLT: CPT

## 2024-12-04 PROCEDURE — 85025 COMPLETE CBC W/AUTO DIFF WBC: CPT

## 2024-12-04 RX ORDER — PREDNISONE 10 MG/1
TABLET ORAL
Qty: 20 TABLET | Refills: 0 | Status: ON HOLD | OUTPATIENT
Start: 2024-12-04 | End: 2024-12-14

## 2024-12-04 RX ORDER — ALBUTEROL SULFATE 0.83 MG/ML
2.5 SOLUTION RESPIRATORY (INHALATION) ONCE
Status: COMPLETED | OUTPATIENT
Start: 2024-12-04 | End: 2024-12-04

## 2024-12-04 RX ORDER — ACETAMINOPHEN 500 MG
1000 TABLET ORAL ONCE
Status: COMPLETED | OUTPATIENT
Start: 2024-12-04 | End: 2024-12-04

## 2024-12-04 RX ORDER — ALBUTEROL SULFATE 0.83 MG/ML
2.5 SOLUTION RESPIRATORY (INHALATION) EVERY 6 HOURS PRN
Qty: 120 EACH | Refills: 0 | Status: ON HOLD | OUTPATIENT
Start: 2024-12-04

## 2024-12-04 RX ORDER — ACETAMINOPHEN 500 MG
TABLET ORAL
Status: COMPLETED
Start: 2024-12-04 | End: 2024-12-04

## 2024-12-04 RX ADMIN — METHYLPREDNISOLONE SODIUM SUCCINATE 125 MG: 125 INJECTION, POWDER, FOR SOLUTION INTRAMUSCULAR; INTRAVENOUS at 07:43

## 2024-12-04 RX ADMIN — AMPICILLIN SODIUM AND SULBACTAM SODIUM 3000 MG: 2; 1 INJECTION, POWDER, FOR SOLUTION INTRAMUSCULAR; INTRAVENOUS at 09:54

## 2024-12-04 RX ADMIN — ALBUTEROL SULFATE 2.5 MG: 2.5 SOLUTION RESPIRATORY (INHALATION) at 07:44

## 2024-12-04 RX ADMIN — Medication 1000 MG: at 09:51

## 2024-12-04 RX ADMIN — ACETAMINOPHEN 1000 MG: 500 TABLET ORAL at 09:51

## 2024-12-04 ASSESSMENT — ENCOUNTER SYMPTOMS
RHINORRHEA: 0
NAUSEA: 0
SHORTNESS OF BREATH: 1
VOMITING: 0
COUGH: 1
ABDOMINAL PAIN: 0
DIARRHEA: 0
BACK PAIN: 0
EYE PAIN: 0
SORE THROAT: 0

## 2024-12-04 ASSESSMENT — PAIN - FUNCTIONAL ASSESSMENT: PAIN_FUNCTIONAL_ASSESSMENT: NONE - DENIES PAIN

## 2024-12-04 NOTE — DISCHARGE INSTRUCTIONS
PLEASE RETURN TO THE EMERGENCY DEPARTMENT IMMEDIATELY if your symptoms worsen in anyway or in 1-2 days if not improved for re-evaluation.  You should immediately return to the ER for symptoms such as new or worsening pain, difficulty breathing or swallowing, a change in your voice, a feeling of passing out, light headed, dizziness, chest pain, headache, neck pain, rash, abdominal pain or vomiting.    Take your medication as indicated and prescribed.  If you are given an antibiotic then, make sure you get the prescription filled and take the antibiotics until finished.      Please understand that at this time there is no evidence for a more serious underlying process, but that early in the process of an illness or injury, an emergency department workup can be falsely reassuring.  You should contact your family doctor within the next 48 hours for a follow up appointment.        THANK YOU!!!    From [facility] and [facility]    On behalf of the Emergency Department staff at [facility], I would like to thank you for giving us the opportunity to address your health care needs and concerns.    We hope that during your visit, our service was delivered in a professional and caring manner. Please keep [facility] in mind as we walk with you down the path to your own personal wellness.     Please expect an automated text message or email from us so we can ask a few questions about your health and progress. Based on your answers, a clinician may call you back to offer help and instructions.    Please understand that early in the process of an illness or injury, an emergency department workup can be falsely reassuring.  If you notice any worsening, changing or persistent symptoms please call your family doctor or return to the ER immediately.     Tell us how we did during your visit at [email or web address]   and let us know about your experience

## 2024-12-05 LAB
EKG ATRIAL RATE: 91 BPM
EKG P AXIS: 53 DEGREES
EKG P-R INTERVAL: 194 MS
EKG Q-T INTERVAL: 396 MS
EKG QRS DURATION: 150 MS
EKG QTC CALCULATION (BAZETT): 487 MS
EKG R AXIS: -62 DEGREES
EKG T AXIS: 86 DEGREES
EKG VENTRICULAR RATE: 91 BPM

## 2024-12-05 PROCEDURE — 93010 ELECTROCARDIOGRAM REPORT: CPT | Performed by: INTERNAL MEDICINE

## 2024-12-06 ENCOUNTER — TELEPHONE (OUTPATIENT)
Age: 84
End: 2024-12-06

## 2024-12-06 RX ORDER — AZITHROMYCIN 250 MG/1
TABLET, FILM COATED ORAL
Qty: 1 PACKET | Refills: 0 | Status: ON HOLD | OUTPATIENT
Start: 2024-12-06 | End: 2024-12-11 | Stop reason: HOSPADM

## 2024-12-06 NOTE — TELEPHONE ENCOUNTER
Patient went to the ER on Wednesday morning, diagnosed with pneumonia.  They gave her Prednisone 40 mg QD x 5 days and Augmentin 875/125 BID.    Blood pressure is up and face is flushed.  Feels \"warm\" inside.    She is not sure but thinks that she could be having an allergic reaction to the Augmentin ?  Also concerned about the medications affecting her INR.    She would like your opinion on the medication they ordered.  She said \"something is not right here\".      ** I checked after talking to her, Amoxicillin and Augmentin are both listed as allergies in her chart !  Augmentin causes diarrhea, no reaction listed for Amoxicillin but it was newly added in 2023.

## 2024-12-06 NOTE — TELEPHONE ENCOUNTER
Please see note regarding Augmentin.  We have Amoxicillin and Augmentin listed as allergies !    She has not taken the Prednisone or the Augmentin yet this morning.    Please advise, not sure what time Dr. CROFT will be in today ?

## 2024-12-06 NOTE — TELEPHONE ENCOUNTER
Stop Augmentin.  Continue prednisone.  If she has tolerated Z-Emmanuel before we will send that into replaced the Augmentin.    Prednisone can cause facial flushing which she was describing.  What dose is she taking prednisone currently

## 2024-12-06 NOTE — TELEPHONE ENCOUNTER
Patient is calling to check up on this and she also wants to know if she can be around family this weekend with her sickness or is she considered contagious?

## 2024-12-06 NOTE — TELEPHONE ENCOUNTER
I sent a Z-Emmanuel.  As long as she has had a few doses of the antibiotic and she is afebrile and noticing some improvement in symptoms she is okay to be around family.  She can continue the prednisone but just be aware it may cause facial flushing as a side effect

## 2024-12-06 NOTE — TELEPHONE ENCOUNTER
Spoke to patient, she has taken a Z-elbert before.      Her Prednisone is 40 mg once daily x 5 days.  She has 10 mg tabs and they told her to take 4 tabs at one time, once a day.    She uses the [facility] in [place].

## 2024-12-08 ENCOUNTER — HOSPITAL ENCOUNTER (INPATIENT)
Age: 84
LOS: 3 days | Discharge: HOME | DRG: 202 | End: 2024-12-11
Attending: EMERGENCY MEDICINE | Admitting: FAMILY MEDICINE
Payer: MEDICARE

## 2024-12-08 ENCOUNTER — APPOINTMENT (OUTPATIENT)
Dept: GENERAL RADIOLOGY | Age: 84
DRG: 202 | End: 2024-12-08
Payer: MEDICARE

## 2024-12-08 DIAGNOSIS — J18.9 PNEUMONIA DUE TO INFECTIOUS ORGANISM, UNSPECIFIED LATERALITY, UNSPECIFIED PART OF LUNG: Primary | ICD-10-CM

## 2024-12-08 DIAGNOSIS — I50.23 ACUTE ON CHRONIC SYSTOLIC CONGESTIVE HEART FAILURE (HCC): ICD-10-CM

## 2024-12-08 PROBLEM — J45.909 BRONCHITIS WITH ASTHMA, ACUTE: Status: ACTIVE | Noted: 2024-12-08

## 2024-12-08 PROBLEM — J20.9 BRONCHITIS WITH ASTHMA, ACUTE: Status: ACTIVE | Noted: 2024-12-08

## 2024-12-08 LAB
ANION GAP SERPL CALCULATED.3IONS-SCNC: 13 MMOL/L (ref 9–17)
BASOPHILS # BLD: 0 K/UL (ref 0–0.2)
BASOPHILS NFR BLD: 0 % (ref 0–2)
BNP SERPL-MCNC: 1193 PG/ML
BUN SERPL-MCNC: 24 MG/DL (ref 8–23)
CALCIUM SERPL-MCNC: 9.5 MG/DL (ref 8.6–10.4)
CHLORIDE SERPL-SCNC: 101 MMOL/L (ref 98–107)
CO2 SERPL-SCNC: 24 MMOL/L (ref 20–31)
CREAT SERPL-MCNC: 0.8 MG/DL (ref 0.5–0.9)
EOSINOPHIL # BLD: 0 K/UL (ref 0–0.4)
EOSINOPHILS RELATIVE PERCENT: 0 % (ref 1–4)
ERYTHROCYTE [DISTWIDTH] IN BLOOD BY AUTOMATED COUNT: 15.2 % (ref 12.5–15.4)
GFR, ESTIMATED: 74 ML/MIN/1.73M2
GLUCOSE BLD-MCNC: 187 MG/DL (ref 65–105)
GLUCOSE BLD-MCNC: 203 MG/DL (ref 65–105)
GLUCOSE BLD-MCNC: 211 MG/DL (ref 65–105)
GLUCOSE SERPL-MCNC: 113 MG/DL (ref 70–99)
HCT VFR BLD AUTO: 34.7 % (ref 36–46)
HGB BLD-MCNC: 11.5 G/DL (ref 12–16)
INR PPP: 2.4
LACTATE BLDV-SCNC: 1.5 MMOL/L (ref 0.5–1.9)
LACTATE BLDV-SCNC: 2.6 MMOL/L (ref 0.5–2.2)
LACTATE BLDV-SCNC: 2.8 MMOL/L (ref 0.5–1.9)
LYMPHOCYTES NFR BLD: 1.1 K/UL (ref 1–4.8)
LYMPHOCYTES RELATIVE PERCENT: 7 % (ref 24–44)
MCH RBC QN AUTO: 31.8 PG (ref 26–34)
MCHC RBC AUTO-ENTMCNC: 33.2 G/DL (ref 31–37)
MCV RBC AUTO: 95.8 FL (ref 80–100)
MICROORGANISM SPEC CULT: NORMAL
MICROORGANISM SPEC CULT: NORMAL
MONOCYTES NFR BLD: 1.1 K/UL (ref 0.1–1.2)
MONOCYTES NFR BLD: 7 % (ref 2–11)
NEUTROPHILS NFR BLD: 86 % (ref 36–66)
NEUTS SEG NFR BLD: 13.1 K/UL (ref 1.8–7.7)
PLATELET # BLD AUTO: 290 K/UL (ref 140–450)
PMV BLD AUTO: 9.4 FL (ref 6–12)
POTASSIUM SERPL-SCNC: 4.3 MMOL/L (ref 3.7–5.3)
PROTHROMBIN TIME: 24.3 SEC (ref 9.4–12.6)
RBC # BLD AUTO: 3.62 M/UL (ref 4–5.2)
SARS-COV-2 RDRP RESP QL NAA+PROBE: NOT DETECTED
SERVICE CMNT-IMP: NORMAL
SERVICE CMNT-IMP: NORMAL
SODIUM SERPL-SCNC: 138 MMOL/L (ref 135–144)
SPECIMEN DESCRIPTION: NORMAL
WBC OTHER # BLD: 15.3 K/UL (ref 3.5–11)

## 2024-12-08 PROCEDURE — 71045 X-RAY EXAM CHEST 1 VIEW: CPT

## 2024-12-08 PROCEDURE — 2580000003 HC RX 258: Performed by: STUDENT IN AN ORGANIZED HEALTH CARE EDUCATION/TRAINING PROGRAM

## 2024-12-08 PROCEDURE — 83605 ASSAY OF LACTIC ACID: CPT

## 2024-12-08 PROCEDURE — 2580000003 HC RX 258: Performed by: FAMILY MEDICINE

## 2024-12-08 PROCEDURE — 87635 SARS-COV-2 COVID-19 AMP PRB: CPT

## 2024-12-08 PROCEDURE — 80048 BASIC METABOLIC PNL TOTAL CA: CPT

## 2024-12-08 PROCEDURE — 87040 BLOOD CULTURE FOR BACTERIA: CPT

## 2024-12-08 PROCEDURE — 82947 ASSAY GLUCOSE BLOOD QUANT: CPT

## 2024-12-08 PROCEDURE — 1210000000 HC MED SURG R&B

## 2024-12-08 PROCEDURE — 6370000000 HC RX 637 (ALT 250 FOR IP): Performed by: EMERGENCY MEDICINE

## 2024-12-08 PROCEDURE — 94640 AIRWAY INHALATION TREATMENT: CPT

## 2024-12-08 PROCEDURE — 36415 COLL VENOUS BLD VENIPUNCTURE: CPT

## 2024-12-08 PROCEDURE — 83880 ASSAY OF NATRIURETIC PEPTIDE: CPT

## 2024-12-08 PROCEDURE — 94761 N-INVAS EAR/PLS OXIMETRY MLT: CPT

## 2024-12-08 PROCEDURE — 6360000002 HC RX W HCPCS: Performed by: EMERGENCY MEDICINE

## 2024-12-08 PROCEDURE — 99285 EMERGENCY DEPT VISIT HI MDM: CPT

## 2024-12-08 PROCEDURE — 6360000002 HC RX W HCPCS: Performed by: FAMILY MEDICINE

## 2024-12-08 PROCEDURE — 6370000000 HC RX 637 (ALT 250 FOR IP): Performed by: FAMILY MEDICINE

## 2024-12-08 PROCEDURE — 85610 PROTHROMBIN TIME: CPT

## 2024-12-08 PROCEDURE — 96365 THER/PROPH/DIAG IV INF INIT: CPT

## 2024-12-08 PROCEDURE — 6360000002 HC RX W HCPCS: Performed by: STUDENT IN AN ORGANIZED HEALTH CARE EDUCATION/TRAINING PROGRAM

## 2024-12-08 PROCEDURE — 96375 TX/PRO/DX INJ NEW DRUG ADDON: CPT

## 2024-12-08 PROCEDURE — 94669 MECHANICAL CHEST WALL OSCILL: CPT

## 2024-12-08 PROCEDURE — 99223 1ST HOSP IP/OBS HIGH 75: CPT | Performed by: FAMILY MEDICINE

## 2024-12-08 PROCEDURE — 85025 COMPLETE CBC W/AUTO DIFF WBC: CPT

## 2024-12-08 RX ORDER — LACTOBACILLUS RHAMNOSUS GG 10B CELL
1 CAPSULE ORAL DAILY
Status: DISCONTINUED | OUTPATIENT
Start: 2024-12-08 | End: 2024-12-11 | Stop reason: HOSPADM

## 2024-12-08 RX ORDER — BENZONATATE 100 MG/1
200 CAPSULE ORAL 3 TIMES DAILY PRN
Status: DISCONTINUED | OUTPATIENT
Start: 2024-12-08 | End: 2024-12-10 | Stop reason: SDUPTHER

## 2024-12-08 RX ORDER — WARFARIN SODIUM 1 MG/1
1.5 TABLET ORAL
Status: COMPLETED | OUTPATIENT
Start: 2024-12-08 | End: 2024-12-08

## 2024-12-08 RX ORDER — LAMOTRIGINE 100 MG/1
100 TABLET ORAL 2 TIMES DAILY
Status: DISCONTINUED | OUTPATIENT
Start: 2024-12-08 | End: 2024-12-08

## 2024-12-08 RX ORDER — LABETALOL 100 MG/1
200 TABLET, FILM COATED ORAL EVERY 12 HOURS SCHEDULED
Status: DISCONTINUED | OUTPATIENT
Start: 2024-12-08 | End: 2024-12-11 | Stop reason: HOSPADM

## 2024-12-08 RX ORDER — ALBUTEROL SULFATE 0.83 MG/ML
2.5 SOLUTION RESPIRATORY (INHALATION) EVERY 4 HOURS PRN
Status: DISCONTINUED | OUTPATIENT
Start: 2024-12-08 | End: 2024-12-11 | Stop reason: HOSPADM

## 2024-12-08 RX ORDER — BUDESONIDE AND FORMOTEROL FUMARATE DIHYDRATE 160; 4.5 UG/1; UG/1
2 AEROSOL RESPIRATORY (INHALATION)
Status: DISCONTINUED | OUTPATIENT
Start: 2024-12-08 | End: 2024-12-11 | Stop reason: HOSPADM

## 2024-12-08 RX ORDER — SODIUM CHLORIDE 9 MG/ML
INJECTION, SOLUTION INTRAVENOUS PRN
Status: DISCONTINUED | OUTPATIENT
Start: 2024-12-08 | End: 2024-12-11 | Stop reason: HOSPADM

## 2024-12-08 RX ORDER — M-VIT,TX,IRON,MINS/CALC/FOLIC 27MG-0.4MG
1 TABLET ORAL DAILY
Status: DISCONTINUED | OUTPATIENT
Start: 2024-12-08 | End: 2024-12-11 | Stop reason: HOSPADM

## 2024-12-08 RX ORDER — DEXTROSE MONOHYDRATE 100 MG/ML
INJECTION, SOLUTION INTRAVENOUS CONTINUOUS PRN
Status: DISCONTINUED | OUTPATIENT
Start: 2024-12-08 | End: 2024-12-11 | Stop reason: HOSPADM

## 2024-12-08 RX ORDER — LANOLIN ALCOHOL/MO/W.PET/CERES
400 CREAM (GRAM) TOPICAL DAILY
Status: DISCONTINUED | OUTPATIENT
Start: 2024-12-08 | End: 2024-12-11 | Stop reason: HOSPADM

## 2024-12-08 RX ORDER — ALBUTEROL SULFATE 0.83 MG/ML
2.5 SOLUTION RESPIRATORY (INHALATION) ONCE
Status: COMPLETED | OUTPATIENT
Start: 2024-12-08 | End: 2024-12-08

## 2024-12-08 RX ORDER — ONDANSETRON 4 MG/1
4 TABLET, ORALLY DISINTEGRATING ORAL EVERY 8 HOURS PRN
Status: DISCONTINUED | OUTPATIENT
Start: 2024-12-08 | End: 2024-12-11 | Stop reason: HOSPADM

## 2024-12-08 RX ORDER — ACETAMINOPHEN 650 MG/1
650 SUPPOSITORY RECTAL EVERY 6 HOURS PRN
Status: DISCONTINUED | OUTPATIENT
Start: 2024-12-08 | End: 2024-12-11 | Stop reason: HOSPADM

## 2024-12-08 RX ORDER — SODIUM CHLORIDE 0.9 % (FLUSH) 0.9 %
5-40 SYRINGE (ML) INJECTION PRN
Status: DISCONTINUED | OUTPATIENT
Start: 2024-12-08 | End: 2024-12-11 | Stop reason: HOSPADM

## 2024-12-08 RX ORDER — ALBUTEROL SULFATE 0.83 MG/ML
2.5 SOLUTION RESPIRATORY (INHALATION) EVERY 6 HOURS PRN
Status: DISCONTINUED | OUTPATIENT
Start: 2024-12-08 | End: 2024-12-08

## 2024-12-08 RX ORDER — DOCUSATE SODIUM 100 MG/1
100 CAPSULE, LIQUID FILLED ORAL 2 TIMES DAILY
Status: DISCONTINUED | OUTPATIENT
Start: 2024-12-08 | End: 2024-12-11 | Stop reason: HOSPADM

## 2024-12-08 RX ORDER — ALBUTEROL SULFATE 2.5 MG/.5ML
2.5 SOLUTION RESPIRATORY (INHALATION) ONCE
Status: COMPLETED | OUTPATIENT
Start: 2024-12-08 | End: 2024-12-08

## 2024-12-08 RX ORDER — LAMOTRIGINE 100 MG/1
100 TABLET ORAL DAILY
Status: DISCONTINUED | OUTPATIENT
Start: 2024-12-09 | End: 2024-12-11 | Stop reason: HOSPADM

## 2024-12-08 RX ORDER — ACETAMINOPHEN 325 MG/1
650 TABLET ORAL EVERY 6 HOURS PRN
Status: DISCONTINUED | OUTPATIENT
Start: 2024-12-08 | End: 2024-12-11 | Stop reason: HOSPADM

## 2024-12-08 RX ORDER — ATORVASTATIN CALCIUM 20 MG/1
20 TABLET, FILM COATED ORAL
Status: DISCONTINUED | OUTPATIENT
Start: 2024-12-08 | End: 2024-12-11 | Stop reason: HOSPADM

## 2024-12-08 RX ORDER — POTASSIUM CHLORIDE 1500 MG/1
40 TABLET, EXTENDED RELEASE ORAL PRN
Status: DISCONTINUED | OUTPATIENT
Start: 2024-12-08 | End: 2024-12-11 | Stop reason: HOSPADM

## 2024-12-08 RX ORDER — CLOPIDOGREL BISULFATE 75 MG/1
75 TABLET ORAL DAILY
Status: DISCONTINUED | OUTPATIENT
Start: 2024-12-08 | End: 2024-12-11 | Stop reason: HOSPADM

## 2024-12-08 RX ORDER — SODIUM CHLORIDE 0.9 % (FLUSH) 0.9 %
5-40 SYRINGE (ML) INJECTION EVERY 12 HOURS SCHEDULED
Status: DISCONTINUED | OUTPATIENT
Start: 2024-12-08 | End: 2024-12-11 | Stop reason: HOSPADM

## 2024-12-08 RX ORDER — INSULIN LISPRO 100 [IU]/ML
0-4 INJECTION, SOLUTION INTRAVENOUS; SUBCUTANEOUS
Status: DISCONTINUED | OUTPATIENT
Start: 2024-12-08 | End: 2024-12-11 | Stop reason: HOSPADM

## 2024-12-08 RX ORDER — IPRATROPIUM BROMIDE AND ALBUTEROL SULFATE 2.5; .5 MG/3ML; MG/3ML
1 SOLUTION RESPIRATORY (INHALATION)
Status: DISCONTINUED | OUTPATIENT
Start: 2024-12-08 | End: 2024-12-08

## 2024-12-08 RX ORDER — ACETAMINOPHEN 325 MG/1
650 TABLET ORAL ONCE
Status: COMPLETED | OUTPATIENT
Start: 2024-12-08 | End: 2024-12-08

## 2024-12-08 RX ORDER — POLYETHYLENE GLYCOL 3350 17 G/17G
17 POWDER, FOR SOLUTION ORAL DAILY PRN
Status: DISCONTINUED | OUTPATIENT
Start: 2024-12-08 | End: 2024-12-11 | Stop reason: HOSPADM

## 2024-12-08 RX ORDER — GUAIFENESIN 600 MG/1
600 TABLET, EXTENDED RELEASE ORAL 2 TIMES DAILY
Status: DISCONTINUED | OUTPATIENT
Start: 2024-12-08 | End: 2024-12-11 | Stop reason: HOSPADM

## 2024-12-08 RX ORDER — IPRATROPIUM BROMIDE AND ALBUTEROL SULFATE 2.5; .5 MG/3ML; MG/3ML
1 SOLUTION RESPIRATORY (INHALATION)
Status: DISCONTINUED | OUTPATIENT
Start: 2024-12-08 | End: 2024-12-10

## 2024-12-08 RX ORDER — MAGNESIUM SULFATE IN WATER 40 MG/ML
2000 INJECTION, SOLUTION INTRAVENOUS PRN
Status: DISCONTINUED | OUTPATIENT
Start: 2024-12-08 | End: 2024-12-11 | Stop reason: HOSPADM

## 2024-12-08 RX ORDER — CETIRIZINE HYDROCHLORIDE 10 MG/1
5 TABLET ORAL DAILY
Status: DISCONTINUED | OUTPATIENT
Start: 2024-12-08 | End: 2024-12-11 | Stop reason: HOSPADM

## 2024-12-08 RX ORDER — GLUCAGON 1 MG/ML
1 KIT INJECTION PRN
Status: DISCONTINUED | OUTPATIENT
Start: 2024-12-08 | End: 2024-12-11 | Stop reason: HOSPADM

## 2024-12-08 RX ORDER — ONDANSETRON 2 MG/ML
4 INJECTION INTRAMUSCULAR; INTRAVENOUS EVERY 6 HOURS PRN
Status: DISCONTINUED | OUTPATIENT
Start: 2024-12-08 | End: 2024-12-11 | Stop reason: HOSPADM

## 2024-12-08 RX ORDER — POTASSIUM CHLORIDE 7.45 MG/ML
10 INJECTION INTRAVENOUS PRN
Status: DISCONTINUED | OUTPATIENT
Start: 2024-12-08 | End: 2024-12-11 | Stop reason: HOSPADM

## 2024-12-08 RX ADMIN — LAMOTRIGINE 50 MG: 25 TABLET ORAL at 22:29

## 2024-12-08 RX ADMIN — INSULIN LISPRO 1 UNITS: 100 INJECTION, SOLUTION INTRAVENOUS; SUBCUTANEOUS at 20:58

## 2024-12-08 RX ADMIN — GUAIFENESIN 600 MG: 600 TABLET, EXTENDED RELEASE ORAL at 12:08

## 2024-12-08 RX ADMIN — METHYLPREDNISOLONE SODIUM SUCCINATE 40 MG: 40 INJECTION, POWDER, FOR SOLUTION INTRAMUSCULAR; INTRAVENOUS at 15:11

## 2024-12-08 RX ADMIN — LABETALOL HYDROCHLORIDE 200 MG: 100 TABLET, FILM COATED ORAL at 20:59

## 2024-12-08 RX ADMIN — AZITHROMYCIN MONOHYDRATE 500 MG: 500 INJECTION, POWDER, LYOPHILIZED, FOR SOLUTION INTRAVENOUS at 07:21

## 2024-12-08 RX ADMIN — WATER 1000 MG: 1 INJECTION INTRAMUSCULAR; INTRAVENOUS; SUBCUTANEOUS at 07:17

## 2024-12-08 RX ADMIN — SODIUM CHLORIDE, PRESERVATIVE FREE 10 ML: 5 INJECTION INTRAVENOUS at 21:00

## 2024-12-08 RX ADMIN — ATORVASTATIN CALCIUM 20 MG: 20 TABLET, FILM COATED ORAL at 11:59

## 2024-12-08 RX ADMIN — METHYLPREDNISOLONE SODIUM SUCCINATE 40 MG: 40 INJECTION, POWDER, FOR SOLUTION INTRAMUSCULAR; INTRAVENOUS at 21:06

## 2024-12-08 RX ADMIN — DOCUSATE SODIUM 100 MG: 100 CAPSULE, LIQUID FILLED ORAL at 20:59

## 2024-12-08 RX ADMIN — IPRATROPIUM BROMIDE AND ALBUTEROL SULFATE 1 DOSE: 2.5; .5 SOLUTION RESPIRATORY (INHALATION) at 19:54

## 2024-12-08 RX ADMIN — Medication 400 MG: at 12:01

## 2024-12-08 RX ADMIN — LAMOTRIGINE 100 MG: 100 TABLET ORAL at 21:00

## 2024-12-08 RX ADMIN — LAMOTRIGINE 100 MG: 100 TABLET ORAL at 11:45

## 2024-12-08 RX ADMIN — CETIRIZINE HYDROCHLORIDE 5 MG: 10 TABLET, FILM COATED ORAL at 12:00

## 2024-12-08 RX ADMIN — WARFARIN SODIUM 1.5 MG: 1 TABLET ORAL at 17:23

## 2024-12-08 RX ADMIN — BUDESONIDE AND FORMOTEROL FUMARATE DIHYDRATE 2 PUFF: 160; 4.5 AEROSOL RESPIRATORY (INHALATION) at 19:54

## 2024-12-08 RX ADMIN — INSULIN LISPRO 1 UNITS: 100 INJECTION, SOLUTION INTRAVENOUS; SUBCUTANEOUS at 12:03

## 2024-12-08 RX ADMIN — DOCUSATE SODIUM 100 MG: 100 CAPSULE, LIQUID FILLED ORAL at 12:07

## 2024-12-08 RX ADMIN — ALBUTEROL SULFATE 2.5 MG: 2.5 SOLUTION RESPIRATORY (INHALATION) at 05:40

## 2024-12-08 RX ADMIN — IPRATROPIUM BROMIDE AND ALBUTEROL SULFATE 1 DOSE: .5; 2.5 SOLUTION RESPIRATORY (INHALATION) at 11:33

## 2024-12-08 RX ADMIN — ACETAMINOPHEN 650 MG: 325 TABLET ORAL at 06:00

## 2024-12-08 RX ADMIN — Medication 1 TABLET: at 12:01

## 2024-12-08 RX ADMIN — LABETALOL HYDROCHLORIDE 200 MG: 100 TABLET, FILM COATED ORAL at 12:08

## 2024-12-08 RX ADMIN — GUAIFENESIN 600 MG: 600 TABLET, EXTENDED RELEASE ORAL at 20:59

## 2024-12-08 RX ADMIN — INSULIN LISPRO 1 UNITS: 100 INJECTION, SOLUTION INTRAVENOUS; SUBCUTANEOUS at 17:18

## 2024-12-08 RX ADMIN — METHYLPREDNISOLONE SODIUM SUCCINATE 125 MG: 125 INJECTION, POWDER, FOR SOLUTION INTRAMUSCULAR; INTRAVENOUS at 06:01

## 2024-12-08 RX ADMIN — Medication 1 CAPSULE: at 12:01

## 2024-12-08 RX ADMIN — ALBUTEROL SULFATE 2.5 MG: 2.5 SOLUTION RESPIRATORY (INHALATION) at 04:52

## 2024-12-08 RX ADMIN — CLOPIDOGREL BISULFATE 75 MG: 75 TABLET ORAL at 12:01

## 2024-12-08 ASSESSMENT — PAIN - FUNCTIONAL ASSESSMENT: PAIN_FUNCTIONAL_ASSESSMENT: NONE - DENIES PAIN

## 2024-12-08 NOTE — PLAN OF CARE
Problem: Respiratory - Adult  Goal: Achieves optimal ventilation and oxygenation  Flowsheets (Taken 12/8/2024 1301)  Achieves optimal ventilation and oxygenation:   Assess for changes in respiratory status   Respiratory therapy support as indicated

## 2024-12-08 NOTE — ED PROVIDER NOTES
ADDENDUM:        The patient was seen for Cough and Shortness of Breath (Er return for cough and SOB following a dx of Pneumonia)  .  The patient's initial evaluation and plan have been discussed with the prior provider Dr. Hernandez who initially evaluated the patient.  Nursing Notes, Past Medical Hx, Past Surgical Hx, Social Hx, Allergies, and Family Hx were all reviewed.    PAST MEDICAL HISTORY    has a past medical history of Asthma, CAD (coronary artery disease), Cancer (HCC), DVT (deep venous thrombosis) ([facility]), Factor 5 Leiden mutation, heterozygous (HCC), Gout, Head injury, Hearing loss, Hearing loss, Hx of blood clots, Hyperlipidemia, Hypertension, Melanoma (HCC), Osteoarthritis, Pulmonary emboli (HCC), Seizures (HCC), Tinnitus, Traumatic intracranial subdural hemorrhage, and Unspecified intracranial injury with loss of consciousness of unspecified duration, initial encounter.    SURGICAL HISTORY      has a past surgical history that includes Hysterectomy; Tonsillectomy; Colonoscopy; Skin cancer excision (Left, 08/25/2015); craniotomy (08/17/2017); craniotomy (Left, 08/17/2017); Vena Cava Filter Placement (08/18/2017);   picc powerpicc double (08/25/2017); Appendectomy; Hysterectomy, total abdominal; Colonoscopy; Bilateral oophorectomy; Cranial surgery (08/16/2017); and ep device procedure (N/A, 3/25/2024).    CURRENT MEDICATIONS       Current Discharge Medication List        CONTINUE these medications which have NOT CHANGED    Details   azithromycin (ZITHROMAX) 250 MG tablet Take 2 tabs (500 mg) on Day 1, and take 1 tab (250 mg) on days 2 through 5.  Qty: 1 packet, Refills: 0      albuterol (PROVENTIL) (2.5 MG/3ML) 0.083% nebulizer solution Take 3 mLs by nebulization every 6 hours as needed for Wheezing  Qty: 120 each, Refills: 0      amoxicillin-clavulanate (AUGMENTIN) 875-125 MG per tablet Take 1 tablet by mouth 2 times daily for 10 days  Qty: 20 tablet, Refills: 0      predniSONE (DELTASONE) 10 MG

## 2024-12-08 NOTE — ED PROVIDER NOTES
eMERGENCY dEPARTMENT eNCOUnter      Pt Name: Manda Doll  MRN: [id number]  Birthdate 1940  Date of evaluation: 12/8/2024      CHIEF COMPLAINT       Chief Complaint   Patient presents with    Cough    Shortness of Breath     Er return for cough and SOB following a dx of Pneumonia          HISTORY OF PRESENT ILLNESS    Manda Doll is a 84 y.o. female who presents to the emergency department with a severe cough and a low-grade fever.  Patient was just diagnosed with pneumonia I think she actually just was sent home recently.  States that she is feeling that she cannot quite catch a deep breath.  She was tested for COVID and influenza recently- these were both negative.    REVIEW OF SYSTEMS     Constitutional: No fevers or chills  HEENT: No sore throat, rhinorrhea, or earache  Eyes: No blurry vision or double vision no drainage  Cardiovascular: No chest pain or tachycardia  Respiratory: No wheezing or shortness of breath no cough  Gastrointestinal: No nausea, vomiting, diarrhea, constipation, or abdominal pain   : No hematuria or dysuria  Musculoskeletal: No swelling or pain  Skin: No rash   Neurological: No focal neurologic complaints, paresthesias, weakness, or headache    PAST MEDICAL HISTORY    has a past medical history of Asthma, CAD (coronary artery disease), Cancer (HCC), DVT (deep venous thrombosis) (HCC), Factor 5 Leiden mutation, heterozygous (HCC), Gout, Head injury, Hearing loss, Hearing loss, Hx of blood clots, Hyperlipidemia, Hypertension, Melanoma (HCC), Osteoarthritis, Pulmonary emboli (HCC), Seizures (HCC), Tinnitus, Traumatic intracranial subdural hemorrhage, and Unspecified intracranial injury with loss of consciousness of unspecified duration, initial encounter.    SURGICAL HISTORY      has a past surgical history that includes Hysterectomy; Tonsillectomy; Colonoscopy; Skin cancer excision (Left, 08/25/2015); craniotomy (08/17/2017); craniotomy (Left, 08/17/2017); Vena Cava

## 2024-12-08 NOTE — H&P
[facility] Medicine   IN-PATIENT [facility] - Location: [place]    HISTORY AND PHYSICAL EXAMINATION            Date:   12/8/2024  Patient name:  Manda Doll  Date of admission:  12/8/2024  4:26 AM  MRN:   [id number]  Account:  [id number]  YOB: 1940  PCP:    Adrian Connelly MD  Room:   [address] [place] [address]  Code Status:    Full Code      History Obtained From:     patient    History of Present Illness:     Manda Doll is a 84 y.o. Non- / non  female who presents with Cough and Shortness of Breath (Er return for cough and SOB following a dx of Pneumonia)   and is admitted to the hospital for the management of Bronchitis with asthma, acute.    Patient has been coughing for the last week, she was in the ER a few days ago and sent home on Augmentin and prednisone, she had called our office thinking she was having an allergic reaction but it was just facial flushing from the prednisone, she was changed to Z-Emmanuel which she started about 2 days ago.  She awoke early this morning with a bad coughing fit and coughed up some blood.  She sometimes coughs so much she cannot catch her breath.  She has past medical history of asthma and uses Symbicort twice daily.  She does not see a pulmonologist.  She does not smoke and no history of smoking, has been exposed to secondary smoke in the past.  She has been using her albuterol nebulizer more often in the last week since she has been coughing.  She denies any fevers or chills at home, her temp was 100.1 in the ER upon arrival.  Significant findings in ER included her white count was slightly more elevated at 15.3 compared to 13.9, 4 days earlier, BNP 1193, compared to 585, 4 days earlier.  Her chest x-ray indicated no acute pneumonia.  She was started on Rocephin and azithromycin IV, given Solu-Medrol and admitted for further evaluation and treatment for failing outpatient management.  She has significant history of factor V

## 2024-12-08 NOTE — ED NOTES
(THERAPEUTIC MULTIVITAMIN-MINERALS) TABLET    Take 1 tablet by mouth daily    PREDNISONE (DELTASONE) 10 MG TABLET    Take 4 tablets by mouth once daily for 5 days    SPACER/AERO-HOLDING CHAMBERS TIMO    1 Device by Does not apply route daily    VITAMIN B-12 (CYANOCOBALAMIN) 1000 MCG TABLET    Take 1 tablet by mouth daily    WARFARIN (COUMADIN) 2 MG TABLET    TAKE 1 TABLET BY MOUTH DAILY     Orders Placed This Encounter   Medications    albuterol (PROVENTIL) (2.5 MG/3ML) 0.083% nebulizer solution 2.5 mg     Order Specific Question:   Initiate RT Bronchodilator Protocol     Answer:   No    methylPREDNISolone sodium (PF) (SOLU-MEDROL PF) injection 125 mg    acetaminophen (TYLENOL) tablet 650 mg    albuterol (PROVENTIL) nebulizer solution 2.5 mg     Order Specific Question:   Initiate RT Bronchodilator Protocol     Answer:   No    cefTRIAXone (ROCEPHIN) 1,000 mg in sterile water 10 mL IV syringe     Order Specific Question:   Antimicrobial Indications     Answer:   Pneumonia (CAP)    azithromycin (ZITHROMAX) 500 mg in sodium chloride 0.9 % 250 mL IVPB ([id number])     Order Specific Question:   Antimicrobial Indications     Answer:   Pneumonia (CAP)    sodium chloride flush 0.9 % injection 5-40 mL    sodium chloride flush 0.9 % injection 5-40 mL    0.9 % sodium chloride infusion    OR Linked Order Group     potassium chloride (KLOR-CON M) extended release tablet 40 mEq     potassium bicarb-citric acid (EFFER-K) effervescent tablet 40 mEq     potassium chloride 10 mEq/100 mL IVPB (Peripheral Line)    magnesium sulfate 2000 mg in 50 mL IVPB premix    OR Linked Order Group     ondansetron (ZOFRAN-ODT) disintegrating tablet 4 mg     ondansetron (ZOFRAN) injection 4 mg    polyethylene glycol (GLYCOLAX) packet 17 g    OR Linked Order Group     acetaminophen (TYLENOL) tablet 650 mg     acetaminophen (TYLENOL) suppository 650 mg    ipratropium 0.5 mg-albuterol 2.5 mg (DUONEB) nebulizer solution 1 Dose     Order Specific Question:

## 2024-12-08 NOTE — RT PROTOCOL NOTE
RT Inhaler-Nebulizer Bronchodilator Protocol Note    There is a bronchodilator order in the chart from a provider indicating to follow the RT Bronchodilator Protocol and there is an “Initiate RT Inhaler-Nebulizer Bronchodilator Protocol” order as well (see protocol at bottom of note).    CXR Findings:  XR CHEST PORTABLE    Result Date: 12/8/2024  There is no evidence of acute chest disease.       The findings from the last RT Protocol Assessment were as follows:   History Pulmonary Disease: None or smoker <15 pack years  Respiratory Pattern: Regular pattern and RR 12-20 bpm  Breath Sounds: Slightly diminished and/or crackles  Cough: Strong, productive  Indication for Bronchodilator Therapy:    Bronchodilator Assessment Score: 3    Aerosolized bronchodilator medication orders have been revised according to the RT Inhaler-Nebulizer Bronchodilator Protocol below.    Respiratory Therapist to perform RT Therapy Protocol Assessment initially then follow the protocol.  Repeat RT Therapy Protocol Assessment PRN for score 0-3 or on second treatment, BID, and PRN for scores above 3.    No Indications - adjust the frequency to every 6 hours PRN wheezing or bronchospasm, if no treatments needed after 48 hours then discontinue using Per Protocol order mode.     If indication present, adjust the RT bronchodilator orders based on the Bronchodilator Assessment Score as indicated below.  Use Inhaler orders unless patient has one or more of the following: on home nebulizer, not able to hold breath for 10 seconds, is not alert and oriented, cannot activate and use MDI correctly, or respiratory rate 25 breaths per minute or more, then use the equivalent nebulizer order(s) with same Frequency and PRN reasons based on the score.  If a patient is on this medication at home then do not decrease Frequency below that used at home.    0-3 - enter or revise RT bronchodilator order(s) to equivalent RT Bronchodilator order with Frequency of

## 2024-12-09 ENCOUNTER — APPOINTMENT (OUTPATIENT)
Age: 84
DRG: 202 | End: 2024-12-09
Attending: FAMILY MEDICINE
Payer: MEDICARE

## 2024-12-09 ENCOUNTER — APPOINTMENT (OUTPATIENT)
Dept: GENERAL RADIOLOGY | Age: 84
DRG: 202 | End: 2024-12-09
Payer: MEDICARE

## 2024-12-09 LAB
ALBUMIN SERPL-MCNC: 3.7 G/DL (ref 3.5–5.2)
ALBUMIN/GLOB SERPL: 1.2 {RATIO} (ref 1–2.5)
ALP SERPL-CCNC: 76 U/L (ref 35–104)
ALT SERPL-CCNC: 20 U/L (ref 5–33)
ANION GAP SERPL CALCULATED.3IONS-SCNC: 14 MMOL/L (ref 9–17)
AST SERPL-CCNC: 17 U/L
BASOPHILS # BLD: 0 K/UL (ref 0–0.2)
BASOPHILS NFR BLD: 0 % (ref 0–2)
BILIRUB SERPL-MCNC: 0.2 MG/DL (ref 0.3–1.2)
BNP SERPL-MCNC: 2512 PG/ML
BUN SERPL-MCNC: 22 MG/DL (ref 8–23)
CALCIUM SERPL-MCNC: 9.3 MG/DL (ref 8.6–10.4)
CHLORIDE SERPL-SCNC: 102 MMOL/L (ref 98–107)
CO2 SERPL-SCNC: 25 MMOL/L (ref 20–31)
CREAT SERPL-MCNC: 0.7 MG/DL (ref 0.5–0.9)
ECHO AO ASC DIAM: 3.6 CM
ECHO AO ASCENDING AORTA INDEX: 2.11 CM/M2
ECHO AO ROOT DIAM: 2.9 CM
ECHO AO ROOT INDEX: 1.7 CM/M2
ECHO AV MEAN GRADIENT: 5 MMHG
ECHO AV MEAN VELOCITY: 1.1 M/S
ECHO AV PEAK GRADIENT: 10 MMHG
ECHO AV PEAK VELOCITY: 1.6 M/S
ECHO AV VELOCITY RATIO: 0.69
ECHO AV VTI: 38.7 CM
ECHO BSA: 1.72 M2
ECHO EST RA PRESSURE: 8 MMHG
ECHO IVC EXP: 2.8 CM
ECHO IVC INSP: 1.4 CM
ECHO LA AREA 2C: 21.6 CM2
ECHO LA AREA 4C: 23.6 CM2
ECHO LA DIAMETER INDEX: 2.51 CM/M2
ECHO LA DIAMETER: 4.3 CM
ECHO LA MAJOR AXIS: 5.5 CM
ECHO LA MINOR AXIS: 5.3 CM
ECHO LA TO AORTIC ROOT RATIO: 1.48
ECHO LA VOL BP: 74 ML (ref 22–52)
ECHO LA VOL MOD A2C: 73 ML (ref 22–52)
ECHO LA VOL MOD A4C: 74 ML (ref 22–52)
ECHO LA VOL/BSA BIPLANE: 43 ML/M2 (ref 16–34)
ECHO LA VOLUME INDEX MOD A2C: 43 ML/M2 (ref 16–34)
ECHO LA VOLUME INDEX MOD A4C: 43 ML/M2 (ref 16–34)
ECHO LV E' LATERAL VELOCITY: 8.92 CM/S
ECHO LV E' SEPTAL VELOCITY: 6.31 CM/S
ECHO LV EF PHYSICIAN: 55 %
ECHO LV FRACTIONAL SHORTENING: 30 % (ref 28–44)
ECHO LV INTERNAL DIMENSION DIASTOLE INDEX: 2.69 CM/M2
ECHO LV INTERNAL DIMENSION DIASTOLIC: 4.6 CM (ref 3.9–5.3)
ECHO LV INTERNAL DIMENSION SYSTOLIC INDEX: 1.87 CM/M2
ECHO LV INTERNAL DIMENSION SYSTOLIC: 3.2 CM
ECHO LV IVSD: 0.9 CM (ref 0.6–0.9)
ECHO LV MASS 2D: 137.7 G (ref 67–162)
ECHO LV MASS INDEX 2D: 80.5 G/M2 (ref 43–95)
ECHO LV POSTERIOR WALL DIASTOLIC: 0.9 CM (ref 0.6–0.9)
ECHO LV RELATIVE WALL THICKNESS RATIO: 0.39
ECHO LVOT AREA: 3.1 CM2
ECHO LVOT AV VTI INDEX: 0.7
ECHO LVOT DIAM: 2 CM
ECHO LVOT MEAN GRADIENT: 3 MMHG
ECHO LVOT PEAK GRADIENT: 5 MMHG
ECHO LVOT PEAK VELOCITY: 1.1 M/S
ECHO LVOT STROKE VOLUME INDEX: 49.9 ML/M2
ECHO LVOT SV: 85.4 ML
ECHO LVOT VTI: 27.2 CM
ECHO MV A VELOCITY: 0.61 M/S
ECHO MV E DECELERATION TIME (DT): 236 MS
ECHO MV E VELOCITY: 0.78 M/S
ECHO MV E/A RATIO: 1.28
ECHO MV E/E' LATERAL: 8.74
ECHO MV E/E' RATIO (AVERAGED): 10.55
ECHO MV E/E' SEPTAL: 12.36
ECHO RIGHT VENTRICULAR SYSTOLIC PRESSURE (RVSP): 51 MMHG
ECHO RV BASAL DIMENSION: 3.9 CM
ECHO RV FREE WALL PEAK S': 12.4 CM/S
ECHO TV REGURGITANT MAX VELOCITY: 3.26 M/S
ECHO TV REGURGITANT PEAK GRADIENT: 43 MMHG
EOSINOPHIL # BLD: 0 K/UL (ref 0–0.4)
EOSINOPHILS RELATIVE PERCENT: 0 % (ref 1–4)
ERYTHROCYTE [DISTWIDTH] IN BLOOD BY AUTOMATED COUNT: 14.6 % (ref 12.5–15.4)
GFR, ESTIMATED: 86 ML/MIN/1.73M2
GLUCOSE BLD-MCNC: 135 MG/DL (ref 65–105)
GLUCOSE BLD-MCNC: 142 MG/DL (ref 65–105)
GLUCOSE BLD-MCNC: 209 MG/DL (ref 65–105)
GLUCOSE BLD-MCNC: 230 MG/DL (ref 65–105)
GLUCOSE SERPL-MCNC: 148 MG/DL (ref 70–99)
HCT VFR BLD AUTO: 34.3 % (ref 36–46)
HGB BLD-MCNC: 11.2 G/DL (ref 12–16)
INR PPP: 3.1
LACTATE BLDV-SCNC: 3.7 MMOL/L (ref 0.5–2.2)
LYMPHOCYTES NFR BLD: 1.2 K/UL (ref 1–4.8)
LYMPHOCYTES RELATIVE PERCENT: 6 % (ref 24–44)
MCH RBC QN AUTO: 31.9 PG (ref 26–34)
MCHC RBC AUTO-ENTMCNC: 32.6 G/DL (ref 31–37)
MCV RBC AUTO: 97.8 FL (ref 80–100)
MICROORGANISM SPEC CULT: NORMAL
MICROORGANISM SPEC CULT: NORMAL
MONOCYTES NFR BLD: 0.5 K/UL (ref 0.1–1.2)
MONOCYTES NFR BLD: 3 % (ref 2–11)
NEUTROPHILS NFR BLD: 91 % (ref 36–66)
NEUTS SEG NFR BLD: 19.3 K/UL (ref 1.8–7.7)
PLATELET # BLD AUTO: 246 K/UL (ref 140–450)
PMV BLD AUTO: 8.4 FL (ref 6–12)
POTASSIUM SERPL-SCNC: 4.1 MMOL/L (ref 3.7–5.3)
PROT SERPL-MCNC: 6.9 G/DL (ref 6.4–8.3)
PROTHROMBIN TIME: 30.1 SEC (ref 9.4–12.6)
RBC # BLD AUTO: 3.51 M/UL (ref 4–5.2)
SERVICE CMNT-IMP: NORMAL
SERVICE CMNT-IMP: NORMAL
SODIUM SERPL-SCNC: 141 MMOL/L (ref 135–144)
SPECIMEN DESCRIPTION: NORMAL
SPECIMEN DESCRIPTION: NORMAL
WBC OTHER # BLD: 21 K/UL (ref 3.5–11)

## 2024-12-09 PROCEDURE — 6360000002 HC RX W HCPCS: Performed by: FAMILY MEDICINE

## 2024-12-09 PROCEDURE — 83880 ASSAY OF NATRIURETIC PEPTIDE: CPT

## 2024-12-09 PROCEDURE — 2580000003 HC RX 258: Performed by: FAMILY MEDICINE

## 2024-12-09 PROCEDURE — 85025 COMPLETE CBC W/AUTO DIFF WBC: CPT

## 2024-12-09 PROCEDURE — 94761 N-INVAS EAR/PLS OXIMETRY MLT: CPT

## 2024-12-09 PROCEDURE — 6370000000 HC RX 637 (ALT 250 FOR IP): Performed by: FAMILY MEDICINE

## 2024-12-09 PROCEDURE — 93306 TTE W/DOPPLER COMPLETE: CPT

## 2024-12-09 PROCEDURE — 85610 PROTHROMBIN TIME: CPT

## 2024-12-09 PROCEDURE — 36415 COLL VENOUS BLD VENIPUNCTURE: CPT

## 2024-12-09 PROCEDURE — 80053 COMPREHEN METABOLIC PANEL: CPT

## 2024-12-09 PROCEDURE — 93306 TTE W/DOPPLER COMPLETE: CPT | Performed by: INTERNAL MEDICINE

## 2024-12-09 PROCEDURE — 94640 AIRWAY INHALATION TREATMENT: CPT

## 2024-12-09 PROCEDURE — 99233 SBSQ HOSP IP/OBS HIGH 50: CPT | Performed by: FAMILY MEDICINE

## 2024-12-09 PROCEDURE — 71046 X-RAY EXAM CHEST 2 VIEWS: CPT

## 2024-12-09 PROCEDURE — 83605 ASSAY OF LACTIC ACID: CPT

## 2024-12-09 PROCEDURE — 82947 ASSAY GLUCOSE BLOOD QUANT: CPT

## 2024-12-09 PROCEDURE — 1210000000 HC MED SURG R&B

## 2024-12-09 RX ORDER — ALBUTEROL SULFATE 90 UG/1
INHALANT RESPIRATORY (INHALATION)
Qty: 8.5 G | OUTPATIENT
Start: 2024-12-09

## 2024-12-09 RX ORDER — WARFARIN SODIUM 1 MG/1
1 TABLET ORAL
Status: COMPLETED | OUTPATIENT
Start: 2024-12-09 | End: 2024-12-09

## 2024-12-09 RX ORDER — LABETALOL 100 MG/1
100 TABLET, FILM COATED ORAL 2 TIMES DAILY PRN
Status: DISCONTINUED | OUTPATIENT
Start: 2024-12-09 | End: 2024-12-11 | Stop reason: HOSPADM

## 2024-12-09 RX ADMIN — LABETALOL HYDROCHLORIDE 100 MG: 100 TABLET, FILM COATED ORAL at 11:39

## 2024-12-09 RX ADMIN — LAMOTRIGINE 150 MG: 25 TABLET ORAL at 20:41

## 2024-12-09 RX ADMIN — GUAIFENESIN 600 MG: 600 TABLET, EXTENDED RELEASE ORAL at 20:40

## 2024-12-09 RX ADMIN — WATER 1000 MG: 1 INJECTION INTRAMUSCULAR; INTRAVENOUS; SUBCUTANEOUS at 06:23

## 2024-12-09 RX ADMIN — SODIUM CHLORIDE, PRESERVATIVE FREE 10 ML: 5 INJECTION INTRAVENOUS at 20:44

## 2024-12-09 RX ADMIN — CETIRIZINE HYDROCHLORIDE 5 MG: 10 TABLET, FILM COATED ORAL at 08:29

## 2024-12-09 RX ADMIN — BUDESONIDE AND FORMOTEROL FUMARATE DIHYDRATE 2 PUFF: 160; 4.5 AEROSOL RESPIRATORY (INHALATION) at 08:21

## 2024-12-09 RX ADMIN — GUAIFENESIN 600 MG: 600 TABLET, EXTENDED RELEASE ORAL at 08:29

## 2024-12-09 RX ADMIN — DOCUSATE SODIUM 100 MG: 100 CAPSULE, LIQUID FILLED ORAL at 20:40

## 2024-12-09 RX ADMIN — INSULIN LISPRO 1 UNITS: 100 INJECTION, SOLUTION INTRAVENOUS; SUBCUTANEOUS at 11:38

## 2024-12-09 RX ADMIN — IPRATROPIUM BROMIDE AND ALBUTEROL SULFATE 1 DOSE: 2.5; .5 SOLUTION RESPIRATORY (INHALATION) at 08:21

## 2024-12-09 RX ADMIN — SODIUM CHLORIDE, PRESERVATIVE FREE 10 ML: 5 INJECTION INTRAVENOUS at 08:32

## 2024-12-09 RX ADMIN — LABETALOL HYDROCHLORIDE 100 MG: 100 TABLET, FILM COATED ORAL at 23:56

## 2024-12-09 RX ADMIN — AZITHROMYCIN MONOHYDRATE 500 MG: 500 INJECTION, POWDER, LYOPHILIZED, FOR SOLUTION INTRAVENOUS at 06:32

## 2024-12-09 RX ADMIN — LABETALOL HYDROCHLORIDE 200 MG: 100 TABLET, FILM COATED ORAL at 20:48

## 2024-12-09 RX ADMIN — INSULIN LISPRO 1 UNITS: 100 INJECTION, SOLUTION INTRAVENOUS; SUBCUTANEOUS at 15:22

## 2024-12-09 RX ADMIN — DOCUSATE SODIUM 100 MG: 100 CAPSULE, LIQUID FILLED ORAL at 08:29

## 2024-12-09 RX ADMIN — LAMOTRIGINE 100 MG: 100 TABLET ORAL at 08:29

## 2024-12-09 RX ADMIN — Medication 1 CAPSULE: at 08:29

## 2024-12-09 RX ADMIN — IPRATROPIUM BROMIDE AND ALBUTEROL SULFATE 1 DOSE: 2.5; .5 SOLUTION RESPIRATORY (INHALATION) at 20:06

## 2024-12-09 RX ADMIN — WARFARIN SODIUM 1 MG: 1 TABLET ORAL at 17:18

## 2024-12-09 RX ADMIN — LABETALOL HYDROCHLORIDE 200 MG: 100 TABLET, FILM COATED ORAL at 08:29

## 2024-12-09 RX ADMIN — CLOPIDOGREL BISULFATE 75 MG: 75 TABLET ORAL at 08:29

## 2024-12-09 RX ADMIN — METHYLPREDNISOLONE SODIUM SUCCINATE 40 MG: 40 INJECTION, POWDER, FOR SOLUTION INTRAMUSCULAR; INTRAVENOUS at 06:24

## 2024-12-09 RX ADMIN — Medication 1 TABLET: at 08:29

## 2024-12-09 RX ADMIN — METHYLPREDNISOLONE SODIUM SUCCINATE 40 MG: 40 INJECTION, POWDER, FOR SOLUTION INTRAMUSCULAR; INTRAVENOUS at 17:18

## 2024-12-09 RX ADMIN — Medication 400 MG: at 08:29

## 2024-12-09 RX ADMIN — SODIUM CHLORIDE: 9 INJECTION, SOLUTION INTRAVENOUS at 06:32

## 2024-12-09 RX ADMIN — LABETALOL HYDROCHLORIDE 100 MG: 100 TABLET, FILM COATED ORAL at 15:23

## 2024-12-09 RX ADMIN — BUDESONIDE AND FORMOTEROL FUMARATE DIHYDRATE 2 PUFF: 160; 4.5 AEROSOL RESPIRATORY (INHALATION) at 20:06

## 2024-12-09 NOTE — RT PROTOCOL NOTE
RT Nebulizer Bronchodilator Protocol Note    There is a bronchodilator order in the chart from a provider indicating to follow the RT Bronchodilator Protocol and there is an “Initiate RT Bronchodilator Protocol” order as well (see protocol at bottom of note).    CXR Findings:  XR CHEST PORTABLE    Result Date: 12/8/2024  There is no evidence of acute chest disease.       The findings from the last RT Protocol Assessment were as follows:  Smoking: None or smoker <15 pack years  Respiratory Pattern: Dyspnea on exertion or RR 21-25 bpm  Breath Sounds: Slightly diminished and/or crackles  Cough: Strong, productive  Indication for Bronchodilator Therapy:    Bronchodilator Assessment Score: 5    Aerosolized bronchodilator medication orders have been revised according to the RT Nebulizer Bronchodilator Protocol below.    Respiratory Therapist to perform RT Therapy Protocol Assessment initially then follow the protocol.  Repeat RT Therapy Protocol Assessment PRN for score 0-3 or on second treatment, BID, and PRN for scores above 3.    No Indications - adjust the frequency to every 6 hours PRN wheezing or bronchospasm, if no treatments needed after 48 hours then discontinue using Per Protocol order mode.     If indication present, adjust the RT bronchodilator orders based on the Bronchodilator Assessment Score as indicated below.  If a patient is on this medication at home then do not decrease Frequency below that used at home.    0-3 - enter or revise RT bronchodilator order(s) to equivalent RT Bronchodilator order with Frequency of every 4 hours PRN for wheezing or increased work of breathing using Per Protocol order mode.       4-6 - enter or revise RT Bronchodilator order(s) to two equivalent RT bronchodilator orders with one order with BID Frequency and one order with Frequency of every 4 hours PRN wheezing or increased work of breathing using Per Protocol order mode.         7-10 - enter or revise RT Bronchodilator

## 2024-12-09 NOTE — PLAN OF CARE
Problem: Chronic Conditions and Co-morbidities  Goal: Patient's chronic conditions and co-morbidity symptoms are monitored and maintained or improved  Outcome: Progressing  Flowsheets  Taken 12/9/2024 0357  Care Plan - Patient's Chronic Conditions and Co-Morbidity Symptoms are Monitored and Maintained or Improved: Monitor and assess patient's chronic conditions and comorbid symptoms for stability, deterioration, or improvement  Taken 12/9/2024 0000  Care Plan - Patient's Chronic Conditions and Co-Morbidity Symptoms are Monitored and Maintained or Improved: Monitor and assess patient's chronic conditions and comorbid symptoms for stability, deterioration, or improvement  Taken 12/8/2024 2000  Care Plan - Patient's Chronic Conditions and Co-Morbidity Symptoms are Monitored and Maintained or Improved: Monitor and assess patient's chronic conditions and comorbid symptoms for stability, deterioration, or improvement     Problem: Discharge Planning  Goal: Discharge to home or other facility with appropriate resources  Outcome: Progressing  Flowsheets  Taken 12/9/2024 0357  Discharge to home or other facility with appropriate resources:   Identify barriers to discharge with patient and caregiver   Arrange for needed discharge resources and transportation as appropriate   Identify discharge learning needs (meds, wound care, etc)   Refer to discharge planning if patient needs post-hospital services based on physician order or complex needs related to functional status, cognitive ability or social support system  Taken 12/9/2024 0000  Discharge to home or other facility with appropriate resources:   Identify barriers to discharge with patient and caregiver   Arrange for needed discharge resources and transportation as appropriate   Identify discharge learning needs (meds, wound care, etc)   Refer to discharge planning if patient needs post-hospital services based on physician order or complex needs related to functional

## 2024-12-09 NOTE — CARE COORDINATION
Case Management Assessment  Initial Evaluation    Date/Time of Evaluation: 12/9/2024 4:49 PM  Assessment Completed by: Kimberly Jon RN    If patient is discharged prior to next notation, then this note serves as note for discharge by case management.    Patient Name: Manda Doll                   YOB: 1940  Diagnosis: Acute on chronic systolic congestive heart failure (HCC) [I50.23]  Bronchitis with asthma, acute [J20.9, J45.909]  Pneumonia due to infectious organism, unspecified laterality, unspecified part of lung [J18.9]                   Date / Time: 12/8/2024  4:26 AM    Patient Admission Status: Inpatient   Readmission Risk (Low < 19, Mod (19-27), High > 27): Readmission Risk Score: 15.7    Current PCP: Adrian Connelly MD  PCP verified by ? Yes    Chart Reviewed: Yes      History Provided by: Patient  Patient Orientation: Alert and Oriented, Person, Place, Situation    Patient Cognition: Alert    Hospitalization in the last 30 days (Readmission):  No    If yes, Readmission Assessment in  Navigator will be completed.    Advance Directives:      Code Status: Full Code   Patient's Primary Decision Maker is: Legal Next of Kin    Primary Decision Maker: Janna Lala - Child - [phone]    Secondary Decision Maker: Dyllan Doll - Child - [phone]    Secondary Decision Maker: Danita Wilkerson \"Kimberly\" - Child - [phone]    Discharge Planning:    Patient lives with: Alone Type of Home: House  Primary Care Giver: Self  Patient Support Systems include: Family Members   Current Financial resources: Medicare  Current community resources: None  Current services prior to admission: Durable Medical Equipment            Current DME: Home Aerosol            Type of Home Care services:  None    ADLS  Prior functional level: Independent in ADLs/IADLs  Current functional level: Independent in ADLs/IADLs    PT AM-PAC:   /24  OT AM-PAC:   /24    Family can provide assistance at DC: Yes  Would

## 2024-12-09 NOTE — PLAN OF CARE
Problem: Respiratory - Adult  Goal: Achieves optimal ventilation and oxygenation  12/9/2024 0825 by Jackie Suarez RCP  Outcome: Progressing  Flowsheets (Taken 12/9/2024 0825)  Achieves optimal ventilation and oxygenation:   Assess for changes in respiratory status   Position to facilitate oxygenation and minimize respiratory effort   Assess the need for suctioning and aspirate as needed   Respiratory therapy support as indicated   Assess and instruct to report shortness of breath or any respiratory difficulty   Encourage broncho-pulmonary hygiene including cough, deep breathe, incentive spirometry   Oxygen supplementation based on oxygen saturation or arterial blood gases   Assess for changes in mentation and behavior

## 2024-12-10 ENCOUNTER — APPOINTMENT (OUTPATIENT)
Dept: CT IMAGING | Age: 84
DRG: 202 | End: 2024-12-10
Payer: MEDICARE

## 2024-12-10 ENCOUNTER — APPOINTMENT (OUTPATIENT)
Dept: GENERAL RADIOLOGY | Age: 84
DRG: 202 | End: 2024-12-10
Payer: MEDICARE

## 2024-12-10 LAB
ALBUMIN SERPL-MCNC: 4.1 G/DL (ref 3.5–5.2)
ALBUMIN/GLOB SERPL: 1.2 {RATIO} (ref 1–2.5)
ALP SERPL-CCNC: 81 U/L (ref 35–104)
ALT SERPL-CCNC: 24 U/L (ref 5–33)
ANION GAP SERPL CALCULATED.3IONS-SCNC: 12 MMOL/L (ref 9–17)
AST SERPL-CCNC: 23 U/L
BASOPHILS # BLD: 0 K/UL (ref 0–0.2)
BASOPHILS NFR BLD: 0 % (ref 0–2)
BILIRUB SERPL-MCNC: 0.3 MG/DL (ref 0.3–1.2)
BUN SERPL-MCNC: 27 MG/DL (ref 8–23)
CALCIUM SERPL-MCNC: 10 MG/DL (ref 8.6–10.4)
CHLORIDE SERPL-SCNC: 101 MMOL/L (ref 98–107)
CO2 SERPL-SCNC: 29 MMOL/L (ref 20–31)
CREAT SERPL-MCNC: 0.9 MG/DL (ref 0.5–0.9)
EOSINOPHIL # BLD: 0 K/UL (ref 0–0.4)
EOSINOPHILS RELATIVE PERCENT: 0 % (ref 1–4)
ERYTHROCYTE [DISTWIDTH] IN BLOOD BY AUTOMATED COUNT: 14.8 % (ref 12.5–15.4)
GFR, ESTIMATED: 64 ML/MIN/1.73M2
GLUCOSE BLD-MCNC: 131 MG/DL (ref 65–105)
GLUCOSE BLD-MCNC: 163 MG/DL (ref 65–105)
GLUCOSE BLD-MCNC: 181 MG/DL (ref 65–105)
GLUCOSE BLD-MCNC: 185 MG/DL (ref 65–105)
GLUCOSE SERPL-MCNC: 107 MG/DL (ref 70–99)
HCT VFR BLD AUTO: 37.5 % (ref 36–46)
HGB BLD-MCNC: 12.3 G/DL (ref 12–16)
INR PPP: 2.9
LACTATE BLDV-SCNC: 3.8 MMOL/L (ref 0.5–2.2)
LYMPHOCYTES NFR BLD: 2.99 K/UL (ref 1–4.8)
LYMPHOCYTES RELATIVE PERCENT: 16 % (ref 24–44)
MCH RBC QN AUTO: 32.1 PG (ref 26–34)
MCHC RBC AUTO-ENTMCNC: 32.7 G/DL (ref 31–37)
MCV RBC AUTO: 98 FL (ref 80–100)
MONOCYTES NFR BLD: 0.75 K/UL (ref 0.1–0.8)
MONOCYTES NFR BLD: 4 % (ref 1–7)
MORPHOLOGY: NORMAL
NEUTROPHILS NFR BLD: 80 % (ref 36–66)
NEUTS SEG NFR BLD: 14.96 K/UL (ref 1.8–7.7)
PLATELET # BLD AUTO: 273 K/UL (ref 140–450)
PMV BLD AUTO: 8.7 FL (ref 6–12)
POTASSIUM SERPL-SCNC: 4.6 MMOL/L (ref 3.7–5.3)
PROT SERPL-MCNC: 7.6 G/DL (ref 6.4–8.3)
PROTHROMBIN TIME: 28.4 SEC (ref 9.4–12.6)
RBC # BLD AUTO: 3.82 M/UL (ref 4–5.2)
SODIUM SERPL-SCNC: 142 MMOL/L (ref 135–144)
WBC OTHER # BLD: 18.7 K/UL (ref 3.5–11)

## 2024-12-10 PROCEDURE — 94761 N-INVAS EAR/PLS OXIMETRY MLT: CPT

## 2024-12-10 PROCEDURE — 6360000002 HC RX W HCPCS: Performed by: FAMILY MEDICINE

## 2024-12-10 PROCEDURE — 6370000000 HC RX 637 (ALT 250 FOR IP): Performed by: STUDENT IN AN ORGANIZED HEALTH CARE EDUCATION/TRAINING PROGRAM

## 2024-12-10 PROCEDURE — 6370000000 HC RX 637 (ALT 250 FOR IP): Performed by: INTERNAL MEDICINE

## 2024-12-10 PROCEDURE — 85025 COMPLETE CBC W/AUTO DIFF WBC: CPT

## 2024-12-10 PROCEDURE — 36415 COLL VENOUS BLD VENIPUNCTURE: CPT

## 2024-12-10 PROCEDURE — 6370000000 HC RX 637 (ALT 250 FOR IP): Performed by: FAMILY MEDICINE

## 2024-12-10 PROCEDURE — 71045 X-RAY EXAM CHEST 1 VIEW: CPT

## 2024-12-10 PROCEDURE — 94640 AIRWAY INHALATION TREATMENT: CPT

## 2024-12-10 PROCEDURE — 87205 SMEAR GRAM STAIN: CPT

## 2024-12-10 PROCEDURE — 83605 ASSAY OF LACTIC ACID: CPT

## 2024-12-10 PROCEDURE — 82947 ASSAY GLUCOSE BLOOD QUANT: CPT

## 2024-12-10 PROCEDURE — 71250 CT THORAX DX C-: CPT

## 2024-12-10 PROCEDURE — 85610 PROTHROMBIN TIME: CPT

## 2024-12-10 PROCEDURE — 6360000002 HC RX W HCPCS: Performed by: STUDENT IN AN ORGANIZED HEALTH CARE EDUCATION/TRAINING PROGRAM

## 2024-12-10 PROCEDURE — 87449 NOS EACH ORGANISM AG IA: CPT

## 2024-12-10 PROCEDURE — 97162 PT EVAL MOD COMPLEX 30 MIN: CPT

## 2024-12-10 PROCEDURE — 2060000000 HC ICU INTERMEDIATE R&B

## 2024-12-10 PROCEDURE — 97116 GAIT TRAINING THERAPY: CPT

## 2024-12-10 PROCEDURE — 2580000003 HC RX 258: Performed by: INTERNAL MEDICINE

## 2024-12-10 PROCEDURE — 2580000003 HC RX 258: Performed by: FAMILY MEDICINE

## 2024-12-10 PROCEDURE — 80053 COMPREHEN METABOLIC PANEL: CPT

## 2024-12-10 PROCEDURE — 87070 CULTURE OTHR SPECIMN AEROBIC: CPT

## 2024-12-10 RX ORDER — WARFARIN SODIUM 1 MG/1
1 TABLET ORAL
Status: COMPLETED | OUTPATIENT
Start: 2024-12-10 | End: 2024-12-10

## 2024-12-10 RX ORDER — GUAIFENESIN 600 MG/1
600 TABLET, EXTENDED RELEASE ORAL 2 TIMES DAILY
Qty: 14 TABLET | Refills: 0 | OUTPATIENT
Start: 2024-12-10 | End: 2024-12-17

## 2024-12-10 RX ORDER — DEXTROMETHORPHAN POLISTIREX 30 MG/5ML
60 SUSPENSION ORAL EVERY 12 HOURS SCHEDULED
Status: DISCONTINUED | OUTPATIENT
Start: 2024-12-10 | End: 2024-12-11 | Stop reason: HOSPADM

## 2024-12-10 RX ORDER — IPRATROPIUM BROMIDE AND ALBUTEROL SULFATE 2.5; .5 MG/3ML; MG/3ML
1 SOLUTION RESPIRATORY (INHALATION)
Status: DISCONTINUED | OUTPATIENT
Start: 2024-12-10 | End: 2024-12-11 | Stop reason: HOSPADM

## 2024-12-10 RX ORDER — LABETALOL HYDROCHLORIDE 5 MG/ML
10 INJECTION, SOLUTION INTRAVENOUS
Status: DISCONTINUED | OUTPATIENT
Start: 2024-12-10 | End: 2024-12-11 | Stop reason: HOSPADM

## 2024-12-10 RX ORDER — BENZONATATE 100 MG/1
200 CAPSULE ORAL 3 TIMES DAILY
Status: DISCONTINUED | OUTPATIENT
Start: 2024-12-10 | End: 2024-12-11 | Stop reason: HOSPADM

## 2024-12-10 RX ORDER — SODIUM CHLORIDE 9 MG/ML
INJECTION, SOLUTION INTRAVENOUS CONTINUOUS
Status: DISCONTINUED | OUTPATIENT
Start: 2024-12-10 | End: 2024-12-11 | Stop reason: HOSPADM

## 2024-12-10 RX ORDER — BENZONATATE 100 MG/1
100 CAPSULE ORAL 3 TIMES DAILY PRN
Qty: 12 CAPSULE | Refills: 0 | OUTPATIENT
Start: 2024-12-10 | End: 2024-12-14

## 2024-12-10 RX ORDER — LABETALOL 100 MG/1
50 TABLET, FILM COATED ORAL AS NEEDED
Qty: 15 TABLET | Refills: 0 | OUTPATIENT
Start: 2024-12-10 | End: 2025-01-09

## 2024-12-10 RX ORDER — LABETALOL 200 MG/1
200 TABLET, FILM COATED ORAL EVERY 12 HOURS SCHEDULED
Qty: 60 TABLET | Refills: 3 | OUTPATIENT
Start: 2024-12-10

## 2024-12-10 RX ADMIN — LABETALOL HYDROCHLORIDE 10 MG: 5 INJECTION, SOLUTION INTRAVENOUS at 17:09

## 2024-12-10 RX ADMIN — GUAIFENESIN 600 MG: 600 TABLET, EXTENDED RELEASE ORAL at 09:52

## 2024-12-10 RX ADMIN — DOCUSATE SODIUM 100 MG: 100 CAPSULE, LIQUID FILLED ORAL at 09:52

## 2024-12-10 RX ADMIN — CLOPIDOGREL BISULFATE 75 MG: 75 TABLET ORAL at 09:52

## 2024-12-10 RX ADMIN — GUAIFENESIN 600 MG: 600 TABLET, EXTENDED RELEASE ORAL at 20:19

## 2024-12-10 RX ADMIN — DEXTROMETHORPHAN 60 MG: 30 SUSPENSION, EXTENDED RELEASE ORAL at 20:47

## 2024-12-10 RX ADMIN — BENZONATATE 200 MG: 100 CAPSULE ORAL at 20:20

## 2024-12-10 RX ADMIN — LABETALOL HYDROCHLORIDE 10 MG: 5 INJECTION, SOLUTION INTRAVENOUS at 05:54

## 2024-12-10 RX ADMIN — LABETALOL HYDROCHLORIDE 10 MG: 5 INJECTION, SOLUTION INTRAVENOUS at 23:53

## 2024-12-10 RX ADMIN — Medication 1 CAPSULE: at 09:52

## 2024-12-10 RX ADMIN — BUDESONIDE AND FORMOTEROL FUMARATE DIHYDRATE 2 PUFF: 160; 4.5 AEROSOL RESPIRATORY (INHALATION) at 08:26

## 2024-12-10 RX ADMIN — SODIUM CHLORIDE, PRESERVATIVE FREE 10 ML: 5 INJECTION INTRAVENOUS at 09:55

## 2024-12-10 RX ADMIN — BUDESONIDE AND FORMOTEROL FUMARATE DIHYDRATE 2 PUFF: 160; 4.5 AEROSOL RESPIRATORY (INHALATION) at 20:55

## 2024-12-10 RX ADMIN — INSULIN LISPRO 1 UNITS: 100 INJECTION, SOLUTION INTRAVENOUS; SUBCUTANEOUS at 20:20

## 2024-12-10 RX ADMIN — IPRATROPIUM BROMIDE AND ALBUTEROL SULFATE 1 DOSE: 2.5; .5 SOLUTION RESPIRATORY (INHALATION) at 08:26

## 2024-12-10 RX ADMIN — LABETALOL HYDROCHLORIDE 200 MG: 100 TABLET, FILM COATED ORAL at 10:49

## 2024-12-10 RX ADMIN — POLYETHYLENE GLYCOL 3350 17 G: 17 POWDER, FOR SOLUTION ORAL at 20:47

## 2024-12-10 RX ADMIN — IPRATROPIUM BROMIDE AND ALBUTEROL SULFATE 1 DOSE: .5; 2.5 SOLUTION RESPIRATORY (INHALATION) at 20:55

## 2024-12-10 RX ADMIN — Medication 1 TABLET: at 09:52

## 2024-12-10 RX ADMIN — METHYLPREDNISOLONE SODIUM SUCCINATE 40 MG: 40 INJECTION, POWDER, FOR SOLUTION INTRAMUSCULAR; INTRAVENOUS at 06:00

## 2024-12-10 RX ADMIN — Medication 400 MG: at 09:52

## 2024-12-10 RX ADMIN — DOCUSATE SODIUM 100 MG: 100 CAPSULE, LIQUID FILLED ORAL at 20:18

## 2024-12-10 RX ADMIN — WARFARIN SODIUM 1 MG: 1 TABLET ORAL at 17:09

## 2024-12-10 RX ADMIN — SODIUM CHLORIDE, PRESERVATIVE FREE 10 ML: 5 INJECTION INTRAVENOUS at 20:26

## 2024-12-10 RX ADMIN — LAMOTRIGINE 100 MG: 100 TABLET ORAL at 09:52

## 2024-12-10 RX ADMIN — CETIRIZINE HYDROCHLORIDE 5 MG: 10 TABLET, FILM COATED ORAL at 09:52

## 2024-12-10 RX ADMIN — LABETALOL HYDROCHLORIDE 10 MG: 5 INJECTION, SOLUTION INTRAVENOUS at 08:08

## 2024-12-10 RX ADMIN — SODIUM CHLORIDE: 9 INJECTION, SOLUTION INTRAVENOUS at 20:30

## 2024-12-10 RX ADMIN — WATER 1000 MG: 1 INJECTION INTRAMUSCULAR; INTRAVENOUS; SUBCUTANEOUS at 06:03

## 2024-12-10 RX ADMIN — LAMOTRIGINE 150 MG: 25 TABLET ORAL at 20:20

## 2024-12-10 RX ADMIN — AZITHROMYCIN MONOHYDRATE 500 MG: 500 INJECTION, POWDER, LYOPHILIZED, FOR SOLUTION INTRAVENOUS at 06:36

## 2024-12-10 RX ADMIN — METHYLPREDNISOLONE SODIUM SUCCINATE 40 MG: 40 INJECTION, POWDER, FOR SOLUTION INTRAMUSCULAR; INTRAVENOUS at 17:10

## 2024-12-10 RX ADMIN — LABETALOL HYDROCHLORIDE 200 MG: 100 TABLET, FILM COATED ORAL at 20:19

## 2024-12-10 ASSESSMENT — PAIN DESCRIPTION - LOCATION: LOCATION: ABDOMEN

## 2024-12-10 ASSESSMENT — PAIN DESCRIPTION - DESCRIPTORS: DESCRIPTORS: PRESSURE;DULL;DISCOMFORT

## 2024-12-10 ASSESSMENT — PAIN - FUNCTIONAL ASSESSMENT: PAIN_FUNCTIONAL_ASSESSMENT: ACTIVITIES ARE NOT PREVENTED

## 2024-12-10 ASSESSMENT — PAIN DESCRIPTION - ORIENTATION: ORIENTATION: MID

## 2024-12-10 ASSESSMENT — PAIN SCALES - GENERAL: PAINLEVEL_OUTOF10: 2

## 2024-12-10 NOTE — CARE COORDINATION
[facility] Quality Flow/Interdisciplinary Rounds Progress Note    Quality Flow Rounds held on December 10, 2024 at 0930    Disciplines Attending:  Bedside Nurse, , , Nursing Unit Leadership, and      Barriers to Discharge   BP control      Anticipated Discharge Date:   1 day    Anticipated Discharge Disposition:  Home no needs    Readmission Risk              Risk of Unplanned Readmission:  28           Discussed patient goal for the day, patient clinical progression, and barriers to discharge.        Kimberly Jon RN  December 10, 2024

## 2024-12-10 NOTE — PLAN OF CARE
Problem: Chronic Conditions and Co-morbidities  Goal: Patient's chronic conditions and co-morbidity symptoms are monitored and maintained or improved  12/10/2024 1359 by Ree Thomas RN  Outcome: Progressing  Flowsheets (Taken 12/10/2024 0810)  Care Plan - Patient's Chronic Conditions and Co-Morbidity Symptoms are Monitored and Maintained or Improved:   Monitor and assess patient's chronic conditions and comorbid symptoms for stability, deterioration, or improvement   Collaborate with multidisciplinary team to address chronic and comorbid conditions and prevent exacerbation or deterioration   Update acute care plan with appropriate goals if chronic or comorbid symptoms are exacerbated and prevent overall improvement and discharge  12/10/2024 0651 by Cathy Cheatham RN  Outcome: Progressing  Flowsheets (Taken 12/9/2024 2000)  Care Plan - Patient's Chronic Conditions and Co-Morbidity Symptoms are Monitored and Maintained or Improved: Monitor and assess patient's chronic conditions and comorbid symptoms for stability, deterioration, or improvement     Problem: Discharge Planning  Goal: Discharge to home or other facility with appropriate resources  12/10/2024 1359 by Ree Thomas RN  Outcome: Progressing  Flowsheets (Taken 12/10/2024 0810)  Discharge to home or other facility with appropriate resources:   Identify barriers to discharge with patient and caregiver   Arrange for needed discharge resources and transportation as appropriate   Identify discharge learning needs (meds, wound care, etc)   Arrange for interpreters to assist at discharge as needed   Refer to discharge planning if patient needs post-hospital services based on physician order or complex needs related to functional status, cognitive ability or social support system  12/10/2024 0651 by Cathy Cheatham RN  Outcome: Progressing  Flowsheets (Taken 12/9/2024 2000)  Discharge to home or other facility with appropriate resources: Identify

## 2024-12-10 NOTE — CONSULTS
[facility]   CONSULT NOTE:      DATE OF CONSULT 12/10/2024    REASON FOR CONSULTATION:  Pulmonary management      PCP Adrian Connelly MD     CHIEF COMPLAINT: Cough, elevated lactic acid    HISTORY OF PRESENT ILLNESS:     Nazanin is a very pleasant 84-year-old female with a history of asthma (no recent pulmonary function testing), secondhand smoke exposure ( of 64 years who recently passed away smoked a pack a day) who presented on December 8 with a cough that started about a week prior to admission.  She had come to the [place] ER and was sent home on Augmentin with prednisone.  She was then switched to a Z-Emmanuel after thinking that she might of had an allergic reaction to Augmentin.  At home she is on an albuterol nebulizer and Symbicort.  She has been taking the nebulizer prior to coming in at least 3-4 times.  However since she has been on \"respiratory protocol\" she is only been getting it twice a day here.  She has not seen a pulmonologist.  According to her son, the cough was so severe that she said that she could not catch her breath.  There were no documented fevers prior to coming into the hospital, however when she was admitted her temperature was 38.2    Her chest x-rays during the course of her admission have been read as no infiltrates/acute process, questionable left lower lobe atelectasis infiltrate, and right midlung infiltrate.    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  She is on chronic warfarin therapy.      When she came in, her COVID influenza and RSV panel were negative and she was started on Rocephin and Zithromax.  She was also placed on IV steroids.  She was given a flutter valve but she has not been using it because she \"feels nebulizers are better\".      Her lactic acid on admission was 2.8 and went down as low as 2.6 but now is back up to 3.8.  Her blood pressure has not dropped.  Her BUN to creatinine

## 2024-12-10 NOTE — PLAN OF CARE
Problem: Respiratory - Adult  Goal: Achieves optimal ventilation and oxygenation  12/10/2024 0833 by Cookie Lara RCP  Outcome: Progressing

## 2024-12-10 NOTE — RT PROTOCOL NOTE
RT Inhaler-Nebulizer Bronchodilator Protocol Note    There is a bronchodilator order in the chart from a provider indicating to follow the RT Bronchodilator Protocol and there is an “Initiate RT Inhaler-Nebulizer Bronchodilator Protocol” order as well (see protocol at bottom of note).    CXR Findings:  No results found.    The findings from the last RT Protocol Assessment were as follows:   History Pulmonary Disease: None or smoker <15 pack years  Respiratory Pattern: Dyspnea on exertion or RR 21-25 bpm  Breath Sounds: Slightly diminished and/or crackles  Cough: Strong, productive  Indication for Bronchodilator Therapy:    Bronchodilator Assessment Score: 5    Aerosolized bronchodilator medication orders have been revised according to the RT Inhaler-Nebulizer Bronchodilator Protocol below.    Respiratory Therapist to perform RT Therapy Protocol Assessment initially then follow the protocol.  Repeat RT Therapy Protocol Assessment PRN for score 0-3 or on second treatment, BID, and PRN for scores above 3.    No Indications - adjust the frequency to every 6 hours PRN wheezing or bronchospasm, if no treatments needed after 48 hours then discontinue using Per Protocol order mode.     If indication present, adjust the RT bronchodilator orders based on the Bronchodilator Assessment Score as indicated below.  Use Inhaler orders unless patient has one or more of the following: on home nebulizer, not able to hold breath for 10 seconds, is not alert and oriented, cannot activate and use MDI correctly, or respiratory rate 25 breaths per minute or more, then use the equivalent nebulizer order(s) with same Frequency and PRN reasons based on the score.  If a patient is on this medication at home then do not decrease Frequency below that used at home.    0-3 - enter or revise RT bronchodilator order(s) to equivalent RT Bronchodilator order with Frequency of every 4 hours PRN for wheezing or increased work of breathing using Per

## 2024-12-10 NOTE — PLAN OF CARE
Problem: Chronic Conditions and Co-morbidities  Goal: Patient's chronic conditions and co-morbidity symptoms are monitored and maintained or improved  Outcome: Progressing  Flowsheets (Taken 12/9/2024 2000)  Care Plan - Patient's Chronic Conditions and Co-Morbidity Symptoms are Monitored and Maintained or Improved: Monitor and assess patient's chronic conditions and comorbid symptoms for stability, deterioration, or improvement     Problem: Discharge Planning  Goal: Discharge to home or other facility with appropriate resources  Outcome: Progressing  Flowsheets (Taken 12/9/2024 2000)  Discharge to home or other facility with appropriate resources: Identify barriers to discharge with patient and caregiver     Problem: ABCDS Injury Assessment  Goal: Absence of physical injury  Outcome: Progressing  Flowsheets (Taken 12/9/2024 2000)  Absence of Physical Injury: Implement safety measures based on patient assessment     Problem: Respiratory - Adult  Goal: Achieves optimal ventilation and oxygenation  Outcome: Progressing  Flowsheets  Taken 12/9/2024 2007 by Sandra Manzo RCP  Achieves optimal ventilation and oxygenation:   Assess for changes in respiratory status   Assess for changes in mentation and behavior   Position to facilitate oxygenation and minimize respiratory effort   Oxygen supplementation based on oxygen saturation or arterial blood gases  Taken 12/9/2024 2000 by Cathy Cheatham, RN  Achieves optimal ventilation and oxygenation: Assess for changes in respiratory status     Problem: Safety - Adult  Goal: Free from fall injury  Outcome: Progressing  Flowsheets (Taken 12/9/2024 2000)  Free From Fall Injury: Instruct family/caregiver on patient safety

## 2024-12-11 VITALS
TEMPERATURE: 97.9 F | WEIGHT: 139.55 LBS | BODY MASS INDEX: 23.25 KG/M2 | SYSTOLIC BLOOD PRESSURE: 147 MMHG | RESPIRATION RATE: 16 BRPM | OXYGEN SATURATION: 93 % | HEART RATE: 61 BPM | DIASTOLIC BLOOD PRESSURE: 72 MMHG | HEIGHT: 65 IN

## 2024-12-11 PROBLEM — T38.0X5A STEROID-INDUCED HYPERGLYCEMIA: Status: ACTIVE | Noted: 2024-12-11

## 2024-12-11 PROBLEM — J18.9 COMMUNITY ACQUIRED PNEUMONIA: Status: ACTIVE | Noted: 2024-12-11

## 2024-12-11 PROBLEM — R73.9 STEROID-INDUCED HYPERGLYCEMIA: Status: ACTIVE | Noted: 2024-12-11

## 2024-12-11 LAB
BASOPHILS # BLD: 0 K/UL (ref 0–0.2)
BASOPHILS NFR BLD: 0 % (ref 0–2)
EOSINOPHIL # BLD: 0 K/UL (ref 0–0.4)
EOSINOPHILS RELATIVE PERCENT: 0 % (ref 1–4)
ERYTHROCYTE [DISTWIDTH] IN BLOOD BY AUTOMATED COUNT: 14.8 % (ref 12.5–15.4)
GLUCOSE BLD-MCNC: 171 MG/DL (ref 65–105)
GLUCOSE BLD-MCNC: 89 MG/DL (ref 65–105)
HCT VFR BLD AUTO: 37.4 % (ref 36–46)
HGB BLD-MCNC: 12.2 G/DL (ref 12–16)
INR PPP: 3
L PNEUMO1 AG UR QL IA.RAPID: NEGATIVE
LACTATE BLDV-SCNC: 0.9 MMOL/L (ref 0.5–2.2)
LYMPHOCYTES NFR BLD: 1.86 K/UL (ref 1–4.8)
LYMPHOCYTES RELATIVE PERCENT: 15 % (ref 24–44)
MCH RBC QN AUTO: 31.7 PG (ref 26–34)
MCHC RBC AUTO-ENTMCNC: 32.7 G/DL (ref 31–37)
MCV RBC AUTO: 96.7 FL (ref 80–100)
MONOCYTES NFR BLD: 1.24 K/UL (ref 0.1–0.8)
MONOCYTES NFR BLD: 10 % (ref 1–7)
MORPHOLOGY: NORMAL
NEUTROPHILS NFR BLD: 75 % (ref 36–66)
NEUTS SEG NFR BLD: 9.3 K/UL (ref 1.8–7.7)
PLATELET # BLD AUTO: 280 K/UL (ref 140–450)
PMV BLD AUTO: 8.3 FL (ref 6–12)
PROCALCITONIN SERPL-MCNC: 0.34 NG/ML (ref 0–0.09)
PROTHROMBIN TIME: 29.7 SEC (ref 9.4–12.6)
RBC # BLD AUTO: 3.87 M/UL (ref 4–5.2)
WBC OTHER # BLD: 12.4 K/UL (ref 3.5–11)

## 2024-12-11 PROCEDURE — 6360000002 HC RX W HCPCS: Performed by: FAMILY MEDICINE

## 2024-12-11 PROCEDURE — 85025 COMPLETE CBC W/AUTO DIFF WBC: CPT

## 2024-12-11 PROCEDURE — 6370000000 HC RX 637 (ALT 250 FOR IP): Performed by: INTERNAL MEDICINE

## 2024-12-11 PROCEDURE — 94640 AIRWAY INHALATION TREATMENT: CPT

## 2024-12-11 PROCEDURE — 6370000000 HC RX 637 (ALT 250 FOR IP): Performed by: FAMILY MEDICINE

## 2024-12-11 PROCEDURE — 86738 MYCOPLASMA ANTIBODY: CPT

## 2024-12-11 PROCEDURE — 83605 ASSAY OF LACTIC ACID: CPT

## 2024-12-11 PROCEDURE — 82947 ASSAY GLUCOSE BLOOD QUANT: CPT

## 2024-12-11 PROCEDURE — 6360000002 HC RX W HCPCS: Performed by: STUDENT IN AN ORGANIZED HEALTH CARE EDUCATION/TRAINING PROGRAM

## 2024-12-11 PROCEDURE — 6370000000 HC RX 637 (ALT 250 FOR IP): Performed by: STUDENT IN AN ORGANIZED HEALTH CARE EDUCATION/TRAINING PROGRAM

## 2024-12-11 PROCEDURE — 36415 COLL VENOUS BLD VENIPUNCTURE: CPT

## 2024-12-11 PROCEDURE — 84145 PROCALCITONIN (PCT): CPT

## 2024-12-11 PROCEDURE — 2580000003 HC RX 258: Performed by: FAMILY MEDICINE

## 2024-12-11 PROCEDURE — 85610 PROTHROMBIN TIME: CPT

## 2024-12-11 RX ORDER — AZITHROMYCIN 500 MG/1
500 TABLET, FILM COATED ORAL DAILY
Qty: 1 PACKET | Refills: 0 | Status: SHIPPED | OUTPATIENT
Start: 2024-12-12 | End: 2024-12-15

## 2024-12-11 RX ORDER — WARFARIN SODIUM 1 MG/1
1 TABLET ORAL
Status: DISCONTINUED | OUTPATIENT
Start: 2024-12-11 | End: 2024-12-11 | Stop reason: HOSPADM

## 2024-12-11 RX ORDER — CEFUROXIME AXETIL 500 MG/1
500 TABLET ORAL 2 TIMES DAILY
Qty: 12 TABLET | Refills: 0 | Status: SHIPPED | OUTPATIENT
Start: 2024-12-12 | End: 2024-12-18

## 2024-12-11 RX ORDER — PREDNISONE 20 MG/1
20 TABLET ORAL DAILY
Qty: 3 TABLET | Refills: 0 | Status: SHIPPED | OUTPATIENT
Start: 2024-12-12 | End: 2024-12-15

## 2024-12-11 RX ADMIN — IPRATROPIUM BROMIDE AND ALBUTEROL SULFATE 1 DOSE: .5; 2.5 SOLUTION RESPIRATORY (INHALATION) at 12:05

## 2024-12-11 RX ADMIN — BENZONATATE 200 MG: 100 CAPSULE ORAL at 09:05

## 2024-12-11 RX ADMIN — AZITHROMYCIN MONOHYDRATE 500 MG: 500 INJECTION, POWDER, LYOPHILIZED, FOR SOLUTION INTRAVENOUS at 06:56

## 2024-12-11 RX ADMIN — LABETALOL HYDROCHLORIDE 200 MG: 100 TABLET, FILM COATED ORAL at 09:04

## 2024-12-11 RX ADMIN — CLOPIDOGREL BISULFATE 75 MG: 75 TABLET ORAL at 09:06

## 2024-12-11 RX ADMIN — DOCUSATE SODIUM 100 MG: 100 CAPSULE, LIQUID FILLED ORAL at 09:04

## 2024-12-11 RX ADMIN — METHYLPREDNISOLONE SODIUM SUCCINATE 40 MG: 40 INJECTION, POWDER, FOR SOLUTION INTRAMUSCULAR; INTRAVENOUS at 09:04

## 2024-12-11 RX ADMIN — CETIRIZINE HYDROCHLORIDE 5 MG: 10 TABLET, FILM COATED ORAL at 09:07

## 2024-12-11 RX ADMIN — WATER 1000 MG: 1 INJECTION INTRAMUSCULAR; INTRAVENOUS; SUBCUTANEOUS at 06:48

## 2024-12-11 RX ADMIN — IPRATROPIUM BROMIDE AND ALBUTEROL SULFATE 1 DOSE: .5; 2.5 SOLUTION RESPIRATORY (INHALATION) at 08:51

## 2024-12-11 RX ADMIN — POLYETHYLENE GLYCOL 3350 17 G: 17 POWDER, FOR SOLUTION ORAL at 09:07

## 2024-12-11 RX ADMIN — BUDESONIDE AND FORMOTEROL FUMARATE DIHYDRATE 2 PUFF: 160; 4.5 AEROSOL RESPIRATORY (INHALATION) at 08:51

## 2024-12-11 RX ADMIN — IPRATROPIUM BROMIDE AND ALBUTEROL SULFATE 1 DOSE: .5; 2.5 SOLUTION RESPIRATORY (INHALATION) at 15:58

## 2024-12-11 RX ADMIN — LABETALOL HYDROCHLORIDE 10 MG: 5 INJECTION, SOLUTION INTRAVENOUS at 02:15

## 2024-12-11 RX ADMIN — GUAIFENESIN 600 MG: 600 TABLET, EXTENDED RELEASE ORAL at 09:05

## 2024-12-11 RX ADMIN — Medication 400 MG: at 09:06

## 2024-12-11 RX ADMIN — DEXTROMETHORPHAN 60 MG: 30 SUSPENSION, EXTENDED RELEASE ORAL at 09:36

## 2024-12-11 RX ADMIN — LAMOTRIGINE 100 MG: 100 TABLET ORAL at 09:06

## 2024-12-11 NOTE — DISCHARGE INSTR - COC
display                     Nurse Assessment:  Last Vital Signs: BP (!) 147/72   Pulse 61   Temp 97.9 °F (36.6 °C) (Oral)   Resp 16   Ht 1.66 m (5' 5.35\")   Wt 63.3 kg (139 lb 8.8 oz)   SpO2 93%   BMI 22.97 kg/m²     Last documented pain score (0-10 scale): Pain Level: 2  Last Weight:   Wt Readings from Last 1 Encounters:   12/11/24 63.3 kg (139 lb 8.8 oz)     Mental Status:  {IP PT MENTAL STATUS:20030}    IV Access:  { MAU IV ACCESS:584478320}    Nursing Mobility/ADLs:  Walking   {CHP DME ADLs:339447882}  Transfer  {CHP DME ADLs:935238242}  Bathing  {CHP DME ADLs:179010825}  Dressing  {CHP DME ADLs:239053004}  Toileting  {CHP DME ADLs:728244614}  Feeding  {CHP DME ADLs:527291808}  Med Admin  {CHP DME ADLs:987656690}  Med Delivery   { MAU MED Delivery:759291528}    Wound Care Documentation and Therapy:  Incision 03/25/24 Chest Left;Upper (Active)   Number of days: 260        Elimination:  Continence:   Bowel: {YES / NO:19727}  Bladder: {YES / NO:19727}  Urinary Catheter: {Urinary Catheter:842899643}   Colostomy/Ileostomy/Ileal Conduit: {YES / NO:19727}       Date of Last BM: ***    Intake/Output Summary (Last 24 hours) at 12/11/2024 1526  Last data filed at 12/10/2024 2311  Gross per 24 hour   Intake 500 ml   Output 350 ml   Net 150 ml     I/O last 3 completed shifts:  In: 500 [P.O.:500]  Out: 350 [Urine:350]    Safety Concerns:     { MAU Safety Concerns:968721127}    Impairments/Disabilities:      { MAU Impairments/Disabilities:195465731}    Nutrition Therapy:  Current Nutrition Therapy:   { MAU Diet List:459292051}    Routes of Feeding: {CHP DME Other Feedings:707310363}  Liquids: {Slp liquid thickness:98766}  Daily Fluid Restriction: {CHP DME Yes amt example:083900398}  Last Modified Barium Swallow with Video (Video Swallowing Test): {Done Not Done Date:304088012}    Treatments at the Time of Hospital Discharge:   Respiratory Treatments: ***  Oxygen Therapy:  {Therapy; copd

## 2024-12-11 NOTE — PLAN OF CARE
Problem: Chronic Conditions and Co-morbidities  Goal: Patient's chronic conditions and co-morbidity symptoms are monitored and maintained or improved  12/11/2024 1555 by Latrice Earl RN  Outcome: Adequate for Discharge  Flowsheets (Taken 12/11/2024 0900)  Care Plan - Patient's Chronic Conditions and Co-Morbidity Symptoms are Monitored and Maintained or Improved:   Monitor and assess patient's chronic conditions and comorbid symptoms for stability, deterioration, or improvement   Collaborate with multidisciplinary team to address chronic and comorbid conditions and prevent exacerbation or deterioration   Update acute care plan with appropriate goals if chronic or comorbid symptoms are exacerbated and prevent overall improvement and discharge     Problem: Discharge Planning  Goal: Discharge to home or other facility with appropriate resources  12/11/2024 1555 by Latrice Earl RN  Outcome: Adequate for Discharge  Flowsheets (Taken 12/11/2024 0900)  Discharge to home or other facility with appropriate resources:   Identify barriers to discharge with patient and caregiver   Arrange for needed discharge resources and transportation as appropriate   Arrange for interpreters to assist at discharge as needed     Problem: ABCDS Injury Assessment  Goal: Absence of physical injury  12/11/2024 1555 by Latrice Earl RN  Outcome: Adequate for Discharge     Problem: Respiratory - Adult  Goal: Achieves optimal ventilation and oxygenation  12/11/2024 1555 by Latrice Earl RN  Outcome: Adequate for Discharge     Problem: Safety - Adult  Goal: Free from fall injury  12/11/2024 1555 by Latrice Earl RN  Outcome: Adequate for Discharge     Problem: Pain  Goal: Verbalizes/displays adequate comfort level or baseline comfort level  12/11/2024 1555 by Latrice Earl RN  Outcome: Adequate for Discharge

## 2024-12-11 NOTE — PROGRESS NOTES
[facility] PULMONARY,CRITICAL CARE & SLEEP   Isai Weinberg MD/Jaswant NGUYEN AGANINOP-BC, NP-C      Theresa NGUYEN NP-C     Shine NGUYEN NP-C                                          Pulmonary Progress Note    Patient - Manda Doll   Age - 84 y.o.   - 1940  MRN - [id number]  Acct # - [id number]  Date of Admission - 2024  4:26 AM    Consulting Service/Physician:       Primary Care Physician: Adrian Connelly MD    SUBJECTIVE:     Chief Complaint:   Chief Complaint   Patient presents with    Cough    Shortness of Breath     Er return for cough and SOB following a dx of Pneumonia     Subjective:    Nazanin tells me she is feeling much better today.  She states she feels ready for discharge.  She has not required oxygen.  She has been afebrile.  Blood cultures with no growth to date.  Sputum with no growth so far.  Legionella was negative.  Mycoplasma is still pending.  Pro-Demond was 0.34.  White blood cell count is downtrending.  She denies dyspnea when up moving around the room.    VITALS  BP (!) 147/72   Pulse 61   Temp 97.9 °F (36.6 °C) (Oral)   Resp 16   Ht 1.66 m (5' 5.35\")   Wt 63.3 kg (139 lb 8.8 oz)   SpO2 93%   BMI 22.97 kg/m²   Wt Readings from Last 3 Encounters:   24 63.3 kg (139 lb 8.8 oz)   24 64 kg (141 lb 1.5 oz)   24 65.5 kg (144 lb 6.4 oz)     I/O (24 Hours)    Intake/Output Summary (Last 24 hours) at 2024 1425  Last data filed at 12/10/2024 2311  Gross per 24 hour   Intake 500 ml   Output 350 ml   Net 150 ml     Ventilator:      Exam:   Physical Exam   Constitutional: Elderly female sitting up in a chair on room air no distress  HENT: Unremarkable  Head: Normocephalic and atraumatic.   Eyes: EOM are normal. Pupils are equal, round, and reactive to light.   Neck: Neck supple.   Cardiovascular:  Regular rate and rhythm.  Normal heart tones.  No JVD.    Pulmonary/Chest: Respirations even and nonlabored, 
  [facility]  IN-PATIENT SERVICE   [facility]    Progress Note    12/11/2024    11:31 AM    Name:   Manda Doll  MRN:     [id number]     Acct:      [id number]   Room:   [address] [place] [address] Day:  3  Admit Date:  12/8/2024  4:26 AM    PCP:   Adrian Connelly MD  Code Status:  Full Code    Subjective:     Patient feels better, has some concerns about blood pressure and blood sugar being elevated from steroids.  Slept well last night    Medications:     Allergies:    Allergies   Allergen Reactions    Influenza Vaccines Anaphylaxis    Potassium Chloride     Amlodipine      Other reaction(s): Unknown    Amoxicillin     Aspirin     Augmentin [Amoxicillin-Pot Clavulanate] Diarrhea    Ciprofibrate     Ciprofloxacin Other (See Comments)    Clavulanic Acid     Cozaar [Losartan Potassium]     Crestor [Rosuvastatin Calcium]     Doxycycline      Other reaction(s): Unknown      Erythromycin     Gabapentin     Lacosamide     Lipitor [Atorvastatin]     Lisinopril Cough     From cardiology notes in [place]      Losartan     Pitavastatin Other (See Comments)    Simvastatin     Tiotropium Bromide-Olodaterol      Other reaction(s): dizziness    Vytorin [Ezetimibe-Simvastatin]     Adhesive Tape     Sulfamethoxazole-Trimethoprim Rash       Current Meds:   Scheduled Meds:    methylPREDNISolone  40 mg IntraVENous Daily    warfarin  1 mg Oral Once    ipratropium 0.5 mg-albuterol 2.5 mg  1 Dose Inhalation 4x Daily RT    benzonatate  200 mg Oral TID    dextromethorphan  60 mg Oral 2 times per day    sodium chloride flush  5-40 mL IntraVENous 2 times per day    atorvastatin  20 mg Oral Once per day on Sunday Wednesday Thursday Saturday    brivaracetam  75 mg Oral BID    budesonide-formoterol  2 puff Inhalation BID RT    clopidogrel  75 mg Oral Daily    labetalol  200 mg Oral 2 times per day    lactobacillus  1 capsule Oral Daily    cetirizine  5 mg Oral Daily    magnesium oxide  400 mg Oral Daily    
Pharmacy Note  Warfarin Consult    Manda Doll is a 84 y.o. female for whom pharmacy has been consulted to manage warfarin therapy.     Consulting Physician: Dr. Michael Connelly  Reason for Admission: Bronchitis with asthma, acute    Warfarin dose prior to admission: 1 mg on Tuesday and 2 mg AOD  Warfarin indication: Factor 5 Leiden / Hx of DVT  Target INR range: 2-3     Past Medical History:   Diagnosis Date    Asthma     CAD (coronary artery disease)     Cancer (HCC)     DVT (deep venous thrombosis) (HCC)     Factor 5 Leiden mutation, heterozygous (HCC)     Gout     Head injury     Hearing loss     Hearing loss     Hx of blood clots     Hyperlipidemia     Hypertension     Melanoma (HCC)     Osteoarthritis     Pulmonary emboli (HCC)     Seizures (HCC)     Tinnitus     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 PT/INR ordered to start 12.08.24.    Thank you for the consult.  Will continue to follow.  Alexei Leal, GarretD  
Pharmacy Note  Warfarin Consult follow-up      Recent Labs     12/09/24  0644   INR 3.1     Recent Labs     12/08/24  0545 12/09/24  0644   HGB 11.5* 11.2*   HCT 34.7* 34.3*    246       Current warfarin drug-drug interactions:  Plavix, APAP, Rocephin, solu-medrol, zithromax    Date INR Dose   12/8/24 2.4 1.5   12/9/24 3.1 1            Notes:                   INR increased significantly from 2.4 to 3.1; will decrease dose to 1 mg today.   Daily PT/INR while inpatient.     Tracy Kaur, PharmD 12/9/2024 8:56 AM    
Pharmacy Note  Warfarin Consult follow-up      Recent Labs     12/11/24  0538   INR 3.0     Recent Labs     12/09/24  0644 12/10/24  0622 12/11/24  0538   HGB 11.2* 12.3 12.2   HCT 34.3* 37.5 37.4    273 280       Current warfarin drug-drug interactions:  Plavix, APAP, rocephin, solu-medrol, zithromax    Date INR Dose   12/8/24 2.4 1.5   12/9/24 3.1 1   12/10/24 2.9 1   12/11/24 3 1       Notes:                     INR therapeutic at 3  Will give 1 mg again today    Daily PT/INR while inpatient.     Tracy Kaur, PharmD 12/11/2024 9:05 AM      
Physical Therapy  Facility/Department: [address]  Physical Therapy Initial Assessment    Name: Manda Doll  : 1940  MRN: [id number]  Date of Service: 12/10/2024    Chief Complaint   Patient presents with    Cough    Shortness of Breath     Er return for cough and SOB following a dx of Pneumonia      Discharge Recommendations:  No therapy recommended at discharge          Patient Diagnosis(es): The primary encounter diagnosis was Pneumonia due to infectious organism, unspecified laterality, unspecified part of lung. A diagnosis of Acute on chronic systolic congestive heart failure (HCC) was also pertinent to this visit.  Past Medical History:  has a past medical history of Asthma, CAD (coronary artery disease), Cancer (HCC), DVT (deep venous thrombosis) (HCC), Factor 5 Leiden mutation, heterozygous (HCC), Gout, Head injury, Hearing loss, Hearing loss, Hx of blood clots, Hyperlipidemia, Hypertension, Melanoma (HCC), Osteoarthritis, Pulmonary emboli (HCC), Seizures (HCC), Tinnitus, Traumatic intracranial subdural hemorrhage, and Unspecified intracranial injury with loss of consciousness of unspecified duration, initial encounter.  Past Surgical History:  has a past surgical history that includes Hysterectomy; Tonsillectomy; Colonoscopy; Skin cancer excision (Left, 2015); craniotomy (2017); craniotomy (Left, 2017); Vena Cava Filter Placement (2017);   picc powerpicc double (2017); Appendectomy; Hysterectomy, total abdominal; Colonoscopy; Bilateral oophorectomy; Cranial surgery (2017); and ep device procedure (N/A, 3/25/2024).    Assessment  Assessment: Pt presents with fever, cough, SOB, ? bronchitis. Pt lives alone and at baseline is independent gait no device, ind ADL's and all household chores. Pt currently demonstrating independence with bed mobility, independent transfers, pt ambulated 420ft no device SBA (except one episode of min assist due to pt tripping toe 
Pt's systolic pressures elevated, patient was given additional dose of labetalol per orders and systolic still above 170. Reached out to Dr. Reyna to advised.   
Spiritual Health History and Assessment/Progress Note  [facility]    (P) Spiritual/Emotional Needs, Crisis, Initial Encounter, (P) Emotional distress, (P) Life Adjustments, Adjustment to illness,      Name: Manda Doll MRN: [id number]    Age: 84 y.o.     Sex: female   Language: English   Advent: [employer]   Bronchitis with asthma, acute     Date: 12/9/2024            Total Time Calculated: (P) 25 min              Spiritual Assessment began in [facility]        Referral/Consult From: (P) Rounding   Encounter Overview/Reason: (P) Spiritual/Emotional Needs, Crisis, Initial Encounter  Service Provided For: (P) Patient, Family       visited Pt and family in rounding visit. Pt was open to conversation and very welcoming during visit.  provided time for grieving loss of family member. Provided support and pastoral care. Prayer and scripture were shared for comfort, peace of mind and encouragement. Good visit.    Skye, Belief, Meaning:   Patient has beliefs or practices that help with coping during difficult times  Family/Friends have beliefs or practices that help with coping during difficult times      Importance and Influence:  Patient has spiritual/personal beliefs that influence decisions regarding their health  Family/Friends have spiritual/personal beliefs that influence decisions regarding the patient's health    Community:  Patient feels well-supported. Support system includes: Children  Family/Friends feel well-supported. Support system includes: Parent/s and Extended family    Assessment and Plan of Care:     Patient Interventions include: Facilitated expression of thoughts and feelings and Explored spiritual coping/struggle/distress  Family/Friends Interventions include: Facilitated expression of thoughts and feelings and Explored spiritual coping/struggle/distress    Patient Plan of Care: Spiritual Care available upon further referral  Family/Friends Plan of Care: 
Writer reached out to provider for concerns of constipation and increased blood pressure. All PRN medications already given. Writer did not receive a response before shift change, passed information to day shift RNLatrice.     
12/09/24  0644 12/10/24  0622    141 142   K 4.3 4.1 4.6    102 101   CO2 24 25 29   GLUCOSE 113* 148* 107*   BUN 24* 22 27*   CREATININE 0.8 0.7 0.9   ANIONGAP 13 14 12   LABGLOM 74 86 64   CALCIUM 9.5 9.3 10.0   PROBNP 1,193* 2,512*  --      Recent Labs     12/09/24  0644 12/09/24  0738 12/09/24  1120 12/09/24  1515 12/09/24  1955 12/10/24  0622 12/10/24  0727 12/10/24  1108   AST 17  --   --   --   --  23  --   --    ALT 20  --   --   --   --  24  --   --    ALKPHOS 76  --   --   --   --  81  --   --    BILITOT 0.2*  --   --   --   --  0.3  --   --    POCGLU  --  142* 230* 209* 135*  --  163* 185*     ABG:  Lab Results   Component Value Date/Time    POCPH 7.441 08/28/2017 04:53 AM    POCPCO2 44.4 08/28/2017 04:53 AM    POCPO2 102.8 08/28/2017 04:53 AM    POCHCO3 30.2 08/28/2017 04:53 AM    NBEA NOT REPORTED 08/28/2017 04:53 AM    PBEA 6 08/28/2017 04:53 AM    JLW8MMZ 32 08/28/2017 04:53 AM    YOFJ3PTP 98 08/28/2017 04:53 AM    FIO2 40.0 08/28/2017 04:53 AM     Lab Results   Component Value Date/Time    SPECIAL 10ml left hand 12/08/2024 05:44 AM    SPECIAL 10mL Right Hand 12/08/2024 05:44 AM     Lab Results   Component Value Date/Time    CULTURE NO GROWTH 2 DAYS 12/08/2024 05:44 AM    CULTURE NO GROWTH 2 DAYS 12/08/2024 05:44 AM       Radiology:  XR CHEST (2 VW)    Result Date: 12/9/2024  Mild-to-moderate atelectatic changes, with or without mild infiltrates in the posterior base of the left pulmonary lower lobe, mildly decreased. Mild streaky atelectatic changes or discoid atelectatic change in the right middle lobe. Borderline cardiac size with cardiac pacer.  No CHF. No pleural effusion or pneumothorax.     XR CHEST PORTABLE    Result Date: 12/8/2024  There is no evidence of acute chest disease.     XR CHEST PORTABLE    Result Date: 12/4/2024  Left lower lobe atelectasis or infiltrate. This may represent pneumonia.       [unfilled]    Physical Examination:     General appearance:  alert, cooperative 
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 difficile status post fecal transplant    [unfilled]      Plan:     Will decrease IV Solu-Medrol:white count little higher likely from Solu-Medrol.  She is not having fevers.  Chest x-ray final report pending but to me it looks like possible right lower lobe infiltrate.  Still awaiting echo for further evaluation of elevated BNP and history CHF.  Extra labetalol dose of 100 mg ordered if systolic blood pressure greater than 170.  INR therapeutic, pharmacy monitoring.  Blood cultures show no growth so far from 12/4 and 12/8.  PT OT eval and treat and social service for possible home discharge needs of home health services    Medical Decision Making: High    Disposition and Communication:     Michael Connelly MD  12/9/2024  10:28 AM

## 2024-12-11 NOTE — PLAN OF CARE
Problem: Respiratory - Adult  Goal: Achieves optimal ventilation and oxygenation  12/11/2024 0900 by Jackie Suarez RCP  Outcome: Progressing  Flowsheets (Taken 12/11/2024 0900)  Achieves optimal ventilation and oxygenation:   Assess for changes in respiratory status   Position to facilitate oxygenation and minimize respiratory effort   Assess the need for suctioning and aspirate as needed   Respiratory therapy support as indicated   Assess and instruct to report shortness of breath or any respiratory difficulty   Encourage broncho-pulmonary hygiene including cough, deep breathe, incentive spirometry   Oxygen supplementation based on oxygen saturation or arterial blood gases   Assess for changes in mentation and behavior

## 2024-12-11 NOTE — PLAN OF CARE
Problem: Respiratory - Adult  Goal: Achieves optimal ventilation and oxygenation  12/10/2024 2059 by Danita Johnson RCP  Outcome: Progressing  Flowsheets (Taken 12/10/2024 2059)  Achieves optimal ventilation and oxygenation:   Assess for changes in respiratory status   Position to facilitate oxygenation and minimize respiratory effort   Respiratory therapy support as indicated   Assess for changes in mentation and behavior   Oxygen supplementation based on oxygen saturation or arterial blood gases   Encourage broncho-pulmonary hygiene including cough, deep breathe, incentive spirometry   Assess and instruct to report shortness of breath or any respiratory difficulty

## 2024-12-11 NOTE — PLAN OF CARE
Problem: Chronic Conditions and Co-morbidities  Goal: Patient's chronic conditions and co-morbidity symptoms are monitored and maintained or improved  Outcome: Progressing  Flowsheets  Taken 12/11/2024 0400  Care Plan - Patient's Chronic Conditions and Co-Morbidity Symptoms are Monitored and Maintained or Improved: Monitor and assess patient's chronic conditions and comorbid symptoms for stability, deterioration, or improvement  Taken 12/10/2024 2355  Care Plan - Patient's Chronic Conditions and Co-Morbidity Symptoms are Monitored and Maintained or Improved: Monitor and assess patient's chronic conditions and comorbid symptoms for stability, deterioration, or improvement  Taken 12/10/2024 1945  Care Plan - Patient's Chronic Conditions and Co-Morbidity Symptoms are Monitored and Maintained or Improved: Monitor and assess patient's chronic conditions and comorbid symptoms for stability, deterioration, or improvement     Problem: Discharge Planning  Goal: Discharge to home or other facility with appropriate resources  Outcome: Progressing  Flowsheets  Taken 12/11/2024 0400  Discharge to home or other facility with appropriate resources:   Identify barriers to discharge with patient and caregiver   Arrange for needed discharge resources and transportation as appropriate   Identify discharge learning needs (meds, wound care, etc)   Refer to discharge planning if patient needs post-hospital services based on physician order or complex needs related to functional status, cognitive ability or social support system  Taken 12/10/2024 2355  Discharge to home or other facility with appropriate resources:   Arrange for needed discharge resources and transportation as appropriate   Identify barriers to discharge with patient and caregiver   Identify discharge learning needs (meds, wound care, etc)   Refer to discharge planning if patient needs post-hospital services based on physician order or complex needs related to

## 2024-12-11 NOTE — CARE COORDINATION
CM spoke with patient, continues to deny needs going home. Son to transport.    IMM letter provided to patient.  Patient offered four hours to make informed decision regarding appeal process; patient agreeable to discharge.

## 2024-12-12 ENCOUNTER — TELEPHONE (OUTPATIENT)
Age: 84
End: 2024-12-12

## 2024-12-12 ENCOUNTER — CARE COORDINATION (OUTPATIENT)
Dept: CARE COORDINATION | Age: 84
End: 2024-12-12

## 2024-12-12 DIAGNOSIS — J96.01 ACUTE RESPIRATORY FAILURE WITH HYPOXIA: Primary | ICD-10-CM

## 2024-12-12 DIAGNOSIS — J20.9 BRONCHITIS WITH ASTHMA, ACUTE: ICD-10-CM

## 2024-12-12 DIAGNOSIS — J45.909 BRONCHITIS WITH ASTHMA, ACUTE: ICD-10-CM

## 2024-12-12 LAB
MICROORGANISM SPEC CULT: NORMAL
MICROORGANISM/AGENT SPEC: NORMAL
SERVICE CMNT-IMP: NORMAL
SPECIMEN DESCRIPTION: NORMAL

## 2024-12-12 PROCEDURE — 1111F DSCHRG MED/CURRENT MED MERGE: CPT | Performed by: FAMILY MEDICINE

## 2024-12-12 NOTE — TELEPHONE ENCOUNTER
Care Transitions Initial Follow Up Call    Outreach made within 2 business days of discharge: Yes    Patient: Manda Doll Patient : 1940   MRN: [id number]  Reason for Admission: Double pneumonia   Discharge Date: 24       Spoke with: Nazanin     Discharge department/facility: [facility]     TCM Interactive Patient Contact:  Was patient able to fill all prescriptions: Yes  Was patient instructed to bring all medications to the follow-up visit: Yes  Is patient taking all medications as directed in the discharge summary? Yes  Does patient understand their discharge instructions: Yes  Does patient have questions or concerns that need addressed prior to 7-14 day follow up office visit: no    Additional needs identified to be addressed with provider  No needs identified             Scheduled appointment with PCP within 7-14 days    Follow Up  Future Appointments   Date Time Provider Department Center   2024  3:50 PM Adrian Connelly MD [place] [facility] DEP   2025  1:00 PM Adrian Connelly MD [place] [facility] DEP       Roz Arriaga MA

## 2024-12-12 NOTE — CARE COORDINATION
Care Transitions Note    Initial Call - Call within 2 business days of discharge: Yes    Attempted to reach patient for transitions of care follow up. Unable to reach patient.    Outreach Attempts:   Multiple attempts to contact patient at phone numbers on file.     Patient: Manda Doll    Patient : 1940   MRN: [id number]    Reason for Admission: PNE d/t infectious organism  Discharge Date: 24  RURS: Readmission Risk Score: 15.2    Last Discharge Facility       Date Complaint Diagnosis Description Type Department Provider    24 Cough; Shortness of Breath Pneumonia due to infectious organism, unspecified laterality, unspecified part of lung ... ED to Hosp-Admission (Discharged) (ADMITTED) [facility] Michael Connelly MD; Felix Hernandez...            Was this an external facility discharge? No    Follow Up Appointment:   Patient does not have a follow up appointment scheduled at time of call.  Unable to reach   Future Appointments         Provider Specialty Dept Phone    2025 1:00 PM Adrian Connelly MD Primary Care [phone]            Plan for follow-up on next business day.      Danita Shetty RN        
sputum.  Patient lives alone, states she has good family support, is independent, spouse recently passed away this past summer. Patient denies any fever or chills today, reports Coumadin clinic has called to give her direction on Coumadin dose for tonight, last INR was 3.0.  She denies any evidence of blood in the stool or coughing up any blood.    Discharge instructions reviewed, 1111 F order entered. Patient has all medications and currently taking. She was advised that she needs PCP follow up within 7 days and pulmonary follow up.  She is asking today if it is necessary she follow up with both, wants to hold off scheduling with pulmonary.  Advised to see PCP and discuss with him the need to follow up with pulmonary.  Expressed to patient to call for any new needs or issues if needed before next outreach.    Care Transition Nurse reviewed discharge instructions with patient. The patient was given an opportunity to ask questions; all questions answered at this time.. The patient verbalized understanding.   Were discharge instructions available to patient? Yes.   Reviewed appropriate site of care based on symptoms and resources available to patient including: PCP  Specialist. The patient agrees to contact the primary care provider and/or specialist office for questions related to their healthcare.      Advance Care Planning:   Does patient have an Advance Directive: not on file; education provided -   .    Medication Reconciliation:  Medication reconciliation was performed with patient,1111F entered: yes.     Remote Patient Monitoring:  Offered patient enrollment in the Remote Patient Monitoring (RPM) program for in-home monitoring: Patient is not eligible for RPM program because: patient does not have qualifying diagnosis.    Assessments:  Care Transitions 24 Hour Call    Schedule Follow Up Appointment with PCP: Completed  Do you have a copy of your discharge instructions?: Yes  Do you have all of your

## 2024-12-13 LAB
M PNEUMO IGM SER QL IA: 0.01
MICROORGANISM SPEC CULT: NORMAL
MICROORGANISM SPEC CULT: NORMAL
SERVICE CMNT-IMP: NORMAL
SERVICE CMNT-IMP: NORMAL
SPECIMEN DESCRIPTION: NORMAL
SPECIMEN DESCRIPTION: NORMAL

## 2024-12-16 ENCOUNTER — OFFICE VISIT (OUTPATIENT)
Age: 84
End: 2024-12-16

## 2024-12-16 VITALS
DIASTOLIC BLOOD PRESSURE: 68 MMHG | TEMPERATURE: 97.3 F | SYSTOLIC BLOOD PRESSURE: 116 MMHG | BODY MASS INDEX: 23.53 KG/M2 | WEIGHT: 141.2 LBS | HEART RATE: 64 BPM | HEIGHT: 65 IN | OXYGEN SATURATION: 95 %

## 2024-12-16 DIAGNOSIS — D68.51 FACTOR 5 LEIDEN MUTATION, HETEROZYGOUS (HCC): ICD-10-CM

## 2024-12-16 DIAGNOSIS — J18.9 MULTIFOCAL PNEUMONIA: Primary | ICD-10-CM

## 2024-12-16 DIAGNOSIS — I50.22 SYSTOLIC CHF, CHRONIC (HCC): ICD-10-CM

## 2024-12-16 DIAGNOSIS — J40 BRONCHITIS: ICD-10-CM

## 2024-12-16 DIAGNOSIS — Z92.29 HISTORY OF COUMADIN THERAPY: ICD-10-CM

## 2024-12-16 DIAGNOSIS — J45.909 ASTHMA, UNSPECIFIED ASTHMA SEVERITY, UNSPECIFIED WHETHER COMPLICATED, UNSPECIFIED WHETHER PERSISTENT: ICD-10-CM

## 2024-12-16 PROBLEM — E27.40 HYPOALDOSTERONISM (HCC): Status: RESOLVED | Noted: 2017-12-22 | Resolved: 2024-12-16

## 2024-12-16 PROBLEM — S06.9X9A: Status: RESOLVED | Noted: 2023-01-01 | Resolved: 2024-12-16

## 2024-12-16 PROBLEM — L97.909 NON-PRESSURE ULCER OF LOWER EXTREMITY (HCC): Status: RESOLVED | Noted: 2023-08-16 | Resolved: 2024-12-16

## 2024-12-16 PROBLEM — D83.9 COMMON VARIABLE IMMUNODEFICIENCY (HCC): Status: RESOLVED | Noted: 2017-12-22 | Resolved: 2024-12-16

## 2024-12-16 PROBLEM — I26.99 PULMONARY EMBOLISM (HCC): Status: RESOLVED | Noted: 2017-12-22 | Resolved: 2024-12-16

## 2024-12-16 PROBLEM — E11.649 HYPOGLYCEMIA DUE TO TYPE 2 DIABETES MELLITUS (HCC): Status: RESOLVED | Noted: 2020-05-21 | Resolved: 2024-12-16

## 2024-12-16 RX ORDER — FLUCONAZOLE 150 MG/1
150 TABLET ORAL
Qty: 2 TABLET | Refills: 0 | Status: SHIPPED | OUTPATIENT
Start: 2024-12-16 | End: 2024-12-22

## 2024-12-16 RX ORDER — PREDNISONE 10 MG/1
10 TABLET ORAL 2 TIMES DAILY
Qty: 8 TABLET | Refills: 0 | Status: SHIPPED | OUTPATIENT
Start: 2024-12-16 | End: 2024-12-20

## 2024-12-16 NOTE — PATIENT INSTRUCTIONS
Manda    Thank you for choosing [facility].  We know you have options when it comes to your healthcare; we appreciate that you chose us. Our goal is to provide exceptional  service and world class care to every patient.  You will be receiving a survey via email or text message asking for your feedback.  Please take a few minutes to share your thoughts about your recent visit. Your comments help us understand what we do well and ways we can improve.  Thank you in advance for your valuable feedback.      Dr. MARJORIE Connelly MD   Advance Care Planning     Advance Care Planning opens a door to talk about and write down your wishes before a sudden accident or illness.  Make your goals, values, and preferences known.     This puts you in the ’s seat and helps others know what matters most to you so they won’t have to guess.      Where can you learn more?    Go to [email or web address]   to learn how to:    Name someone you trust to make healthcare decisions for you, only if you can’t. (Healthcare Power of )    Document your wishes for care if you were seriously ill and not expected to recover or are approaching end of life. (Advance Directive or Living Will)    The same page can be found using the QR code below.

## 2024-12-16 NOTE — PROGRESS NOTES
[facility]     Date of Visit:  2024  Patient Name: Manda Doll   Patient :  1940     CHIEF COMPLAINT/HPI:     Manda Doll is a 84 y.o. female who presents today for an general visit to be evaluated for the following condition(s):  Chief Complaint   Patient presents with    Cough     She is here for a continued cough after having double pneumonia.     Follow-Up from Hospital     She is here for her [facility] follow up where she was admitted on -2024 for SOB and Double Pneumonia.  This was the second admission for this.  She is still coughing a lot and the cough keeps her up at night. She was put on Cefuroxime  500 mg BID x 6 days, Azithromycin 500 mg 1 daily x 3 days and Prednisone 20 meq 1 daily x 3 days       Patient was originally at the emergency room on  and was started on antibiotics.  Apparently there was a vague infiltrate in the right lower lobe.  On  she was admitted to the hospital from the emergency room because of progressive symptoms.  She was hospitalized from  and discharged on .  She was diagnosed with community-acquired pneumonia and CT scan showed evidence of multifocal pneumonia.  She was discharged on Ceftin and azithromycin and prednisone.  She is now done with prednisone.  She is on home aerosol treatments 4 times a day and also Advair.  She is not taking any Mucinex.  She reports that she is bringing up clear mucus no blood originally sputum was purulent and brown.  She was seen in the hospital in consultation by pulmonology  REVIEW OF SYSTEM      Review of Systems  Patient has no other healthcare issues.  No fever no sweats no chills. No chest pain nor shortness of breath. No nausea nor vomiting no diarrhea . No  complaints.  No edema issues.      Hemoglobin A1C   Date Value Ref Range Status   2023 5.9 % Final       Lab Results   Component Value Date/Time     12/10/2024 06:22

## 2024-12-18 ENCOUNTER — CARE COORDINATION (OUTPATIENT)
Dept: CARE COORDINATION | Age: 84
End: 2024-12-18

## 2024-12-18 NOTE — CARE COORDINATION
Care Transitions Note    Follow Up Call     Attempted to reach patient for transitions of care follow up.  Unable to reach patient.      Outreach Attempts:   Multiple attempts to contact patient at phone numbers on file.     Care Summary Note: Patient being followed after admission for PNE. She had her hospital follow up on Monday, notes reviewed. Left message requesting return call.     Follow Up Appointment:   Future Appointments         Provider Specialty Dept Phone    12/30/2024 4:00 PM Adrian Connelly MD Primary Care [phone]    2/21/2025 1:00 PM Adrian Connelly MD Primary [facility] [phone]            Plan for follow-up on next business day.  based on severity of symptoms and risk factors. Plan for next call:  How is cough? Any f/c, or other s/s of worsening URI?     Danita Shetty RN

## 2024-12-19 ENCOUNTER — CARE COORDINATION (OUTPATIENT)
Dept: CARE COORDINATION | Age: 84
End: 2024-12-19

## 2024-12-19 NOTE — CARE COORDINATION
Care Transitions Note    Follow Up Call     Attempted to reach patient for transitions of care follow up.  Unable to reach patient.      Outreach Attempts:   Multiple attempts to contact patient at phone numbers on file.     Care Summary Note: Patient being followed for PNE, initial call made last week. 2nd attempt to reach patient this week with no answer.  If no return call by end of day will resolve.  Patient did have her follow up with PCP on Monday, notes reviewed, no new orders.     Follow Up Appointment:   Future Appointments         Provider Specialty Dept Phone    12/30/2024 4:00 PM Adrian Connelly MD [facility] [phone]    2/21/2025 1:00 PM Adrian Connelly MD [facility] [phone]            No further follow-up call indicated based on severity of symptoms and risk factors. Plan for next call:  check on cough, check for new respiratory symptoms.     Danita Shetty RN        
chest pain

## 2024-12-24 ENCOUNTER — TELEPHONE (OUTPATIENT)
Age: 84
End: 2024-12-24

## 2024-12-24 RX ORDER — DEXTROMETHORPHAN HYDROBROMIDE AND PROMETHAZINE HYDROCHLORIDE 15; 6.25 MG/5ML; MG/5ML
5 SYRUP ORAL 4 TIMES DAILY PRN
Qty: 180 ML | Refills: 0 | Status: SHIPPED | OUTPATIENT
Start: 2024-12-24 | End: 2025-01-02

## 2024-12-24 NOTE — TELEPHONE ENCOUNTER
Patient's daughter Janna called to ask about script that was sent in.  We sent script for Promethazine-Dextromethorphan syrup.  Janna said that patient is struggling with high blood pressure and they wanted to make sure that this cough syrup will not affect her BP ?    Please let Janna know at [phone].    Thank you.

## 2024-12-24 NOTE — TELEPHONE ENCOUNTER
PT was in 12/16/24 for pneumonia and was given meds to treat, continues to cough constantly and cannot sleep. Is there anything else she can take? No other acute symptoms

## 2024-12-27 NOTE — TELEPHONE ENCOUNTER
Patient said the Promethazine is making her stomach upset. She is wondering if she can just take robitussin??

## 2024-12-30 ENCOUNTER — OFFICE VISIT (OUTPATIENT)
Age: 84
End: 2024-12-30

## 2024-12-30 VITALS
OXYGEN SATURATION: 95 % | DIASTOLIC BLOOD PRESSURE: 60 MMHG | TEMPERATURE: 97.9 F | HEART RATE: 81 BPM | SYSTOLIC BLOOD PRESSURE: 150 MMHG | WEIGHT: 141 LBS | BODY MASS INDEX: 23.49 KG/M2 | HEIGHT: 65 IN

## 2024-12-30 DIAGNOSIS — Z92.29 HISTORY OF COUMADIN THERAPY: ICD-10-CM

## 2024-12-30 DIAGNOSIS — R05.1 ACUTE COUGH: ICD-10-CM

## 2024-12-30 DIAGNOSIS — J18.9 MULTIFOCAL PNEUMONIA: Primary | ICD-10-CM

## 2024-12-30 DIAGNOSIS — I16.0 HYPERTENSIVE URGENCY: ICD-10-CM

## 2024-12-30 RX ORDER — PREDNISONE 10 MG/1
10 TABLET ORAL 2 TIMES DAILY
Qty: 10 TABLET | Refills: 0 | Status: SHIPPED | OUTPATIENT
Start: 2024-12-30 | End: 2025-01-04

## 2024-12-30 RX ORDER — CEFDINIR 300 MG/1
300 CAPSULE ORAL 2 TIMES DAILY
Qty: 14 CAPSULE | Refills: 0 | Status: SHIPPED | OUTPATIENT
Start: 2024-12-30 | End: 2025-01-06

## 2024-12-30 NOTE — PROGRESS NOTES
(TESSALON) 100 MG capsule TAKE ONE TO TWO CAPSULES BY MOUTH THREE TIMES A DAY AS NEEDED FOR COUGH (Patient not taking: Reported on 12/16/2024)  20 capsule 0     No current facility-administered medications for this visit.        Patient Active Problem List   Diagnosis    HTN (hypertension)    Adrenal adenoma    Diabetes mellitus type 2 in nonobese (HCC)    PVD (peripheral vascular disease) ([facility])    Hyperlipemia    Malignant melanoma of skin of trunk, except scrotum ([facility])    Subdural hematoma    Deep vein thrombosis (DVT) of femoral vein of left lower extremity ([facility])    Pleural effusion, bilateral    Aspiration pneumonia of both lower lobes (HCC)    Pericardial effusion    Candida, oral    Acute deep vein thrombosis (DVT) of axillary vein of right upper extremity (HCC)    Systolic CHF, chronic ([facility])    Seizure disorder ([facility])    Neuropathy    Traumatic hematoma of forehead    Factor 5 Leiden mutation, heterozygous ([facility])    Allergy status to analgesic agent    Altered mental status    Atherosclerosis of native coronary artery of native heart without angina pectoris    Diarrhea, unspecified    Gout    Diverticulitis of intestine    Hypertensive urgency    Laceration without foreign body of left cheek and temporomandibular area, subsequent encounter    Long term (current) use of anticoagulants    Long term (current) use of oral hypoglycemic drugs    Metabolic encephalopathy    Other chest pain    Other forms of dyspnea    Personal history of other venous thrombosis and embolism    History of pulmonary embolism    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

## 2025-01-06 RX ORDER — BUDESONIDE AND FORMOTEROL FUMARATE DIHYDRATE 160; 4.5 UG/1; UG/1
2 AEROSOL RESPIRATORY (INHALATION) 2 TIMES DAILY
Qty: 10.2 G | Refills: 3 | Status: SHIPPED | OUTPATIENT
Start: 2025-01-06

## 2025-01-06 NOTE — TELEPHONE ENCOUNTER
Manda Doll is calling to request a refill on the following medication(s):    Medication Request:  Requested Prescriptions     Pending Prescriptions Disp Refills    budesonide-formoterol (SYMBICORT) 160-4.5 MCG/ACT AERO [Pharmacy Med Name: BUDESONIDE-FORMOTEROL 160-4.5] 10.2 g 3     Sig: INHALE 2 PUFFS BY MOUTH TWICE A DAY       Last Visit Date (If Applicable):  12/30/2024    Next Visit Date:    2/21/2025

## 2025-01-06 NOTE — TELEPHONE ENCOUNTER
Patient daughter hoping to get this filled today as she is almost out and also getting over double pneumonia    Task marked high priority

## 2025-01-08 ENCOUNTER — HOSPITAL ENCOUNTER (OUTPATIENT)
Dept: CT IMAGING | Age: 85
Discharge: HOME | End: 2025-01-10
Attending: FAMILY MEDICINE
Payer: MEDICARE

## 2025-01-08 DIAGNOSIS — J18.9 MULTIFOCAL PNEUMONIA: ICD-10-CM

## 2025-01-08 PROCEDURE — 71250 CT THORAX DX C-: CPT

## 2025-01-09 NOTE — RESULT ENCOUNTER NOTE
Please call patient CT scan showed improvement in the pneumonia however they did see some new bronchitis in the left upper lobe.  She will need another CT scan in 2 months.  I would like her to use her aerosol machine 3 times a day routinely to help clear out the mucus from the bronchial tree.  Finished up antibiotics that she was given last visit and keep follow-up appointment

## 2025-01-10 ENCOUNTER — OFFICE VISIT (OUTPATIENT)
Age: 85
End: 2025-01-10
Payer: MEDICARE

## 2025-01-10 ENCOUNTER — TELEPHONE (OUTPATIENT)
Age: 85
End: 2025-01-10

## 2025-01-10 VITALS
DIASTOLIC BLOOD PRESSURE: 60 MMHG | HEART RATE: 66 BPM | OXYGEN SATURATION: 96 % | BODY MASS INDEX: 23.23 KG/M2 | TEMPERATURE: 98 F | WEIGHT: 139.6 LBS | SYSTOLIC BLOOD PRESSURE: 120 MMHG

## 2025-01-10 DIAGNOSIS — J45.909 ASTHMA, UNSPECIFIED ASTHMA SEVERITY, UNSPECIFIED WHETHER COMPLICATED, UNSPECIFIED WHETHER PERSISTENT: ICD-10-CM

## 2025-01-10 DIAGNOSIS — D68.69 ACQUIRED HYPERCOAGULABLE STATE (HCC): ICD-10-CM

## 2025-01-10 DIAGNOSIS — I50.22 SYSTOLIC CHF, CHRONIC (HCC): ICD-10-CM

## 2025-01-10 DIAGNOSIS — J18.9 MULTIFOCAL PNEUMONIA: Primary | ICD-10-CM

## 2025-01-10 DIAGNOSIS — Z92.29 HISTORY OF COUMADIN THERAPY: ICD-10-CM

## 2025-01-10 DIAGNOSIS — J47.0 BRONCHIECTASIS WITH ACUTE LOWER RESPIRATORY INFECTION (HCC): ICD-10-CM

## 2025-01-10 PROBLEM — S06.5XAA SUBDURAL HEMATOMA: Status: RESOLVED | Noted: 2017-08-17 | Resolved: 2025-01-10

## 2025-01-10 PROBLEM — G40.109 LOCALIZATION-RELATED (FOCAL) (PARTIAL) SYMPTOMATIC EPILEPSY AND EPILEPTIC SYNDROMES WITH SIMPLE PARTIAL SEIZURES, NOT INTRACTABLE, WITHOUT STATUS EPILEPTICUS (HCC): Status: RESOLVED | Noted: 2018-04-06 | Resolved: 2025-01-10

## 2025-01-10 PROBLEM — G40.909 SEIZURE DISORDER (HCC): Status: RESOLVED | Noted: 2017-09-27 | Resolved: 2025-01-10

## 2025-01-10 PROBLEM — C43.60 MALIGNANT MELANOMA OF UPPER EXTREMITY (HCC): Status: RESOLVED | Noted: 2017-12-22 | Resolved: 2025-01-10

## 2025-01-10 PROCEDURE — 99214 OFFICE O/P EST MOD 30 MIN: CPT | Performed by: FAMILY MEDICINE

## 2025-01-10 PROCEDURE — 1123F ACP DISCUSS/DSCN MKR DOCD: CPT | Performed by: FAMILY MEDICINE

## 2025-01-10 PROCEDURE — 1159F MED LIST DOCD IN RCRD: CPT | Performed by: FAMILY MEDICINE

## 2025-01-10 PROCEDURE — 3078F DIAST BP <80 MM HG: CPT | Performed by: FAMILY MEDICINE

## 2025-01-10 PROCEDURE — 3074F SYST BP LT 130 MM HG: CPT | Performed by: FAMILY MEDICINE

## 2025-01-10 RX ORDER — CEFUROXIME AXETIL 500 MG/1
TABLET ORAL
Qty: 14 TABLET | Refills: 0 | Status: SHIPPED | OUTPATIENT
Start: 2025-01-10

## 2025-01-10 RX ORDER — ALBUTEROL SULFATE 0.83 MG/ML
2.5 SOLUTION RESPIRATORY (INHALATION) EVERY 6 HOURS PRN
Qty: 120 EACH | Refills: 5 | Status: SHIPPED | OUTPATIENT
Start: 2025-01-10

## 2025-01-10 RX ORDER — LABETALOL 100 MG/1
TABLET, FILM COATED ORAL
Qty: 120 TABLET | Refills: 5
Start: 2025-01-10

## 2025-01-10 ASSESSMENT — PATIENT HEALTH QUESTIONNAIRE - PHQ9
SUM OF ALL RESPONSES TO PHQ QUESTIONS 1-9: 0
3. TROUBLE FALLING OR STAYING ASLEEP: NOT AT ALL
7. TROUBLE CONCENTRATING ON THINGS, SUCH AS READING THE NEWSPAPER OR WATCHING TELEVISION: NOT AT ALL
2. FEELING DOWN, DEPRESSED OR HOPELESS: NOT AT ALL
SUM OF ALL RESPONSES TO PHQ QUESTIONS 1-9: 0
SUM OF ALL RESPONSES TO PHQ QUESTIONS 1-9: 0
9. THOUGHTS THAT YOU WOULD BE BETTER OFF DEAD, OR OF HURTING YOURSELF: NOT AT ALL
1. LITTLE INTEREST OR PLEASURE IN DOING THINGS: NOT AT ALL
SUM OF ALL RESPONSES TO PHQ QUESTIONS 1-9: 0
8. MOVING OR SPEAKING SO SLOWLY THAT OTHER PEOPLE COULD HAVE NOTICED. OR THE OPPOSITE, BEING SO FIGETY OR RESTLESS THAT YOU HAVE BEEN MOVING AROUND A LOT MORE THAN USUAL: NOT AT ALL
SUM OF ALL RESPONSES TO PHQ9 QUESTIONS 1 & 2: 0
6. FEELING BAD ABOUT YOURSELF - OR THAT YOU ARE A FAILURE OR HAVE LET YOURSELF OR YOUR FAMILY DOWN: NOT AT ALL
4. FEELING TIRED OR HAVING LITTLE ENERGY: NOT AT ALL
5. POOR APPETITE OR OVEREATING: NOT AT ALL
10. IF YOU CHECKED OFF ANY PROBLEMS, HOW DIFFICULT HAVE THESE PROBLEMS MADE IT FOR YOU TO DO YOUR WORK, TAKE CARE OF THINGS AT HOME, OR GET ALONG WITH OTHER PEOPLE: NOT DIFFICULT AT ALL

## 2025-01-10 NOTE — TELEPHONE ENCOUNTER
Manda Doll is calling to request a refill on the following medication(s):    Medication Request:  Requested Prescriptions     Pending Prescriptions Disp Refills    albuterol (PROVENTIL) (2.5 MG/3ML) 0.083% nebulizer solution 120 each 0     Sig: Take 3 mLs by nebulization every 6 hours as needed for Wheezing       Last Visit Date (If Applicable):  1/10/2025    Next Visit Date:    1/31/2025

## 2025-01-10 NOTE — PROGRESS NOTES
(2024)    Housing Stability Vital Sign     Unable to Pay for Housing in the Last Year: No     Number of Times Moved in the Last Year: 0     Homeless in the Last Year: No        PHYSICAL EXAM     /60 (Position: Standing)   Pulse 66   Temp 98 °F (36.7 °C)   Wt 63.3 kg (139 lb 9.6 oz)   SpO2 96%   BMI 23.23 kg/m²    Physical Exam  Alert no distress neck no masses chest scattered rhonchi no rales no wheezes normal sinus rhythm no murmur no edema    ASSESSMENT/PLAN     1. Multifocal pneumonia  2. History of Coumadin therapy  3. Systolic CHF, chronic (HCC)  4. Asthma, unspecified asthma severity, unspecified whether complicated, unspecified whether persistent  5. Acquired hypercoagulable state (HCC)  6. Bronchiectasis with acute lower respiratory infection ([facility])       Orders Placed This Encounter   Medications    labetalol (NORMODYNE) 100 MG tablet     Si tablets twice daily and may take 50 mg extra day if systolic is above 170     Dispense:  120 tablet     Refill:  5    cefUROXime (CEFTIN) 500 MG tablet     Si qd     Dispense:  14 tablet     Refill:  0       Return in about 3 weeks (around 2025).  Patient will use her nebulizer 4 times a day continue Mucinex continue Symbicort Ceftin 500 mg 1 daily for 2 weeks.  Return here in 2 to 3 weeks decrease labetalol 100 mg twice a day    COMMUNICATION:       Electronically signed by Adrian Connelly MD on 1/10/2025 at 3:48 PM    Portion of this note were dictated using Dragon speech recognition software. It has been reviewed for accuracy, but may contain grammatical and clerical errors.

## 2025-01-10 NOTE — TELEPHONE ENCOUNTER
She said she will need a refill of her albuterol for the nebullizer since you increased your dose.

## 2025-01-30 ENCOUNTER — TELEPHONE (OUTPATIENT)
Age: 85
End: 2025-01-30

## 2025-01-30 NOTE — TELEPHONE ENCOUNTER
Janna her daughter called to say that they seen the pulmonologist today and they are going to be doing a procedure next week to go down and take a sample to the mucous in her lungs to see what antibiotic is going to work the best because she is not getting over the Bronchitis/Pneumonia like she should.   She was suppose to see you tomorrow but the cancelled the appointment since she is having this done.  The pulmonologist is suppose to be sending you a note from today's visit.

## 2025-02-04 ENCOUNTER — HOSPITAL ENCOUNTER (OUTPATIENT)
Age: 85
Setting detail: SPECIMEN
Discharge: HOME | End: 2025-02-04

## 2025-02-04 LAB
BASOPHILS # BLD: 0.04 K/UL (ref 0–0.2)
BASOPHILS NFR BLD: 1 % (ref 0–2)
EOSINOPHIL # BLD: 0.29 K/UL (ref 0–0.44)
EOSINOPHILS RELATIVE PERCENT: 3 % (ref 1–4)
ERYTHROCYTE [DISTWIDTH] IN BLOOD BY AUTOMATED COUNT: 14.6 % (ref 11.8–14.4)
HCT VFR BLD AUTO: 42.1 % (ref 36.3–47.1)
HGB BLD-MCNC: 12.4 G/DL (ref 11.9–15.1)
IMM GRANULOCYTES # BLD AUTO: 0.03 K/UL (ref 0–0.3)
IMM GRANULOCYTES NFR BLD: 0 %
LYMPHOCYTES NFR BLD: 1.94 K/UL (ref 1.1–3.7)
LYMPHOCYTES RELATIVE PERCENT: 22 % (ref 24–43)
MCH RBC QN AUTO: 31.2 PG (ref 25.2–33.5)
MCHC RBC AUTO-ENTMCNC: 29.5 G/DL (ref 28.4–34.8)
MCV RBC AUTO: 105.8 FL (ref 82.6–102.9)
MONOCYTES NFR BLD: 0.98 K/UL (ref 0.1–1.2)
MONOCYTES NFR BLD: 11 % (ref 3–12)
NEUTROPHILS NFR BLD: 63 % (ref 36–65)
NEUTS SEG NFR BLD: 5.38 K/UL (ref 1.5–8.1)
NRBC BLD-RTO: 0 PER 100 WBC
PLATELET # BLD AUTO: 236 K/UL (ref 138–453)
PMV BLD AUTO: 11.2 FL (ref 8.1–13.5)
PROCALCITONIN SERPL-MCNC: 0.09 NG/ML (ref 0–0.09)
RBC # BLD AUTO: 3.98 M/UL (ref 3.95–5.11)
RBC # BLD: ABNORMAL 10*6/UL
RBC # BLD: ABNORMAL 10*6/UL
WBC OTHER # BLD: 8.7 K/UL (ref 3.5–11.3)

## 2025-02-21 ENCOUNTER — OFFICE VISIT (OUTPATIENT)
Age: 85
End: 2025-02-21

## 2025-02-21 VITALS
HEART RATE: 87 BPM | BODY MASS INDEX: 23.49 KG/M2 | OXYGEN SATURATION: 97 % | DIASTOLIC BLOOD PRESSURE: 82 MMHG | TEMPERATURE: 97.8 F | HEIGHT: 65 IN | WEIGHT: 141 LBS | SYSTOLIC BLOOD PRESSURE: 150 MMHG

## 2025-02-21 DIAGNOSIS — J45.909 ASTHMA, UNSPECIFIED ASTHMA SEVERITY, UNSPECIFIED WHETHER COMPLICATED, UNSPECIFIED WHETHER PERSISTENT: ICD-10-CM

## 2025-02-21 DIAGNOSIS — J47.0 BRONCHIECTASIS WITH ACUTE LOWER RESPIRATORY INFECTION (HCC): ICD-10-CM

## 2025-02-21 DIAGNOSIS — I16.0 HYPERTENSIVE URGENCY: ICD-10-CM

## 2025-02-21 DIAGNOSIS — M10.9 GOUT, UNSPECIFIED CAUSE, UNSPECIFIED CHRONICITY, UNSPECIFIED SITE: ICD-10-CM

## 2025-02-21 DIAGNOSIS — D68.51 FACTOR 5 LEIDEN MUTATION, HETEROZYGOUS: ICD-10-CM

## 2025-02-21 DIAGNOSIS — I50.22 SYSTOLIC CHF, CHRONIC (HCC): ICD-10-CM

## 2025-02-21 DIAGNOSIS — J18.9 MULTIFOCAL PNEUMONIA: ICD-10-CM

## 2025-02-21 DIAGNOSIS — I25.10 ATHEROSCLEROSIS OF NATIVE CORONARY ARTERY OF NATIVE HEART WITHOUT ANGINA PECTORIS: Primary | ICD-10-CM

## 2025-02-21 DIAGNOSIS — E11.9 DIABETES MELLITUS TYPE 2 IN NONOBESE (HCC): ICD-10-CM

## 2025-02-21 RX ORDER — ALLOPURINOL 300 MG/1
300 TABLET ORAL DAILY
Qty: 90 TABLET | Refills: 3 | Status: SHIPPED | OUTPATIENT
Start: 2025-02-21

## 2025-02-21 RX ORDER — CLOPIDOGREL BISULFATE 75 MG/1
75 TABLET ORAL DAILY
Qty: 90 TABLET | Refills: 2 | Status: SHIPPED | OUTPATIENT
Start: 2025-02-21

## 2025-02-21 NOTE — PROGRESS NOTES
[facility]     Date of Visit:  2025  Patient Name: Manda Doll   Patient :  1940     CHIEF COMPLAINT/HPI:     Manda Doll is a 84 y.o. female who presents today for an general visit to be evaluated for the following condition(s):  Chief Complaint   Patient presents with    Pneumonia     Patient is here for a follow up on her pneumonia patient states she is feeling better    Patient's cough is much better she has been to pulmonology and she is now using smart vest to help mobilize sputum and it seems to be working quite well      Hemoglobin A1C   Date Value Ref Range Status   2023 5.9 % Final       Lab Results   Component Value Date/Time     12/10/2024 06:22 AM    K 4.6 12/10/2024 06:22 AM     12/10/2024 06:22 AM    CO2 29 12/10/2024 06:22 AM    BUN 27 12/10/2024 06:22 AM    CREATININE 0.9 12/10/2024 06:22 AM    GLUCOSE 107 12/10/2024 06:22 AM    CALCIUM 10.0 12/10/2024 06:22 AM    LABGLOM 64 12/10/2024 06:22 AM    LABGLOM >60 2024 05:57 AM    LABGLOM 80.5 2023 12:00 AM        No results found for: \"MALBCR\"     Lab Results   Component Value Date    CHOL 198 2024    TRIG 163 (H) 2024    HDL 57 2024    VLDL 33 (H) 2024    CHOLHDLRATIO 3.5 2024        Lab Results   Component Value Date    WBC 8.7 2025    HGB 12.4 2025    HCT 42.1 2025    .8 (H) 2025     2025       Lab Results   Component Value Date    ALT 24 12/10/2024    AST 23 12/10/2024    ALKPHOS 81 12/10/2024    BILITOT 0.3 12/10/2024        Lab Results   Component Value Date    TSH 4.11 2024        REVIEW OF SYSTEM      Review of Systems  No fever no sweats no chills no chest pain no shortness of breath no nausea no vomiting no diarrhea patient does complain of hoarse voice from the Symbicort but she is doing so well no changes will be made at this time.  She will check with her insurance and see if Breo or other once

## 2025-03-11 ENCOUNTER — RESULTS FOLLOW-UP (OUTPATIENT)
Dept: CT IMAGING | Age: 85
End: 2025-03-11

## 2025-03-11 ENCOUNTER — HOSPITAL ENCOUNTER (OUTPATIENT)
Dept: CT IMAGING | Age: 85
Discharge: HOME | End: 2025-03-13
Attending: FAMILY MEDICINE
Payer: MEDICARE

## 2025-03-11 DIAGNOSIS — J47.0 BRONCHIECTASIS WITH ACUTE LOWER RESPIRATORY INFECTION (HCC): ICD-10-CM

## 2025-03-11 PROCEDURE — 71250 CT THORAX DX C-: CPT

## 2025-03-11 NOTE — RESULT ENCOUNTER NOTE
Please let patient know the CT scan of the chest shows improvement in the pulmonary congestion.  But she needs another CT scan to check the pulmonary nodule in 6 months

## 2025-04-09 ENCOUNTER — OFFICE VISIT (OUTPATIENT)
Age: 85
End: 2025-04-09

## 2025-04-09 VITALS
SYSTOLIC BLOOD PRESSURE: 168 MMHG | BODY MASS INDEX: 23.66 KG/M2 | TEMPERATURE: 97.9 F | WEIGHT: 142.2 LBS | OXYGEN SATURATION: 96 % | HEART RATE: 68 BPM | DIASTOLIC BLOOD PRESSURE: 96 MMHG

## 2025-04-09 DIAGNOSIS — J45.20 MILD INTERMITTENT ASTHMA WITHOUT COMPLICATION: ICD-10-CM

## 2025-04-09 DIAGNOSIS — J06.9 VIRAL URI: Primary | ICD-10-CM

## 2025-04-09 DIAGNOSIS — Z86.73 HISTORY OF STROKE: ICD-10-CM

## 2025-04-09 DIAGNOSIS — I10 PRIMARY HYPERTENSION: ICD-10-CM

## 2025-04-09 RX ORDER — LAMOTRIGINE 100 MG/1
100 TABLET ORAL EVERY MORNING
COMMUNITY

## 2025-04-09 NOTE — PROGRESS NOTES
TIMO 1 Device by Does not apply route daily 1 each 3    atorvastatin (LIPITOR) 20 MG tablet TAKE 1 TABLET BY MOUTH DAILY ON SATURDAY AND SUNDAY AND WEDNESDAY AND THURSDAY 40 tablet 11    Lactobacillus (ACIDOPHILUS/PECTIN PO) Take 1 tablet by mouth      warfarin (COUMADIN) 2 MG tablet TAKE 1 TABLET BY MOUTH DAILY 90 tablet 1    albuterol sulfate HFA (PROVENTIL;VENTOLIN;PROAIR) 108 (90 Base) MCG/ACT inhaler As directed as needed.      brivaracetam (BRIVIACT) 25 MG TABS tablet Take 3 tablets by mouth 2 times daily.      lamoTRIgine (LAMICTAL) 100 MG tablet Take 1.5 tablets by mouth nightly      loratadine (CLARITIN) 10 MG tablet Take 1 tablet every day by oral route.      magnesium oxide (MAG-OX) 400 MG tablet 1 tablet daily      vitamin B-12 (CYANOCOBALAMIN) 1000 MCG tablet Take 1 tablet by mouth daily      Multiple Vitamins-Minerals (THERAPEUTIC MULTIVITAMIN-MINERALS) tablet Take 1 tablet by mouth daily      ascorbic acid (VITAMIN C) 500 MG tablet Take 2 tablets by mouth daily      Cholecalciferol (VITAMIN D3) 2000 UNITS CAPS Take 1 capsule by mouth      cefUROXime (CEFTIN) 500 MG tablet 1 qd 14 tablet 0     No current facility-administered medications for this visit.     Allergies, PMH, Surgical Hx, Family Hx, and Social Hx reviewed and updated.  Health Maintenance reviewed.    Objective    Vitals:    04/09/25 1008   BP: (!) 168/96   Pulse: 68   Temp: 97.9 °F (36.6 °C)   SpO2: 96%   Weight: 64.5 kg (142 lb 3.2 oz)       Vitals and nursing note reviewed.   HENT:      Head: Normocephalic.      Right Ear: Tympanic membrane normal.      Left Ear: Tympanic membrane normal.      Nose: Nose normal.      Mouth/Throat:      Mouth: Mucous membranes are moist.   Eyes:      Conjunctiva/sclera: Conjunctivae normal.   Cardiovascular:      Rate and Rhythm: Normal rate and regular rhythm.      Heart sounds: No murmur heard.  Pulmonary:      Effort: Pulmonary effort is normal.      Breath sounds: Normal breath sounds. No wheezing.

## 2025-05-02 ENCOUNTER — OFFICE VISIT (OUTPATIENT)
Age: 85
End: 2025-05-02

## 2025-05-02 VITALS
OXYGEN SATURATION: 98 % | SYSTOLIC BLOOD PRESSURE: 120 MMHG | DIASTOLIC BLOOD PRESSURE: 72 MMHG | HEIGHT: 65 IN | TEMPERATURE: 97.9 F | BODY MASS INDEX: 23.66 KG/M2 | HEART RATE: 82 BPM | WEIGHT: 142 LBS | RESPIRATION RATE: 12 BRPM

## 2025-05-02 DIAGNOSIS — J45.20 MILD INTERMITTENT ASTHMA WITHOUT COMPLICATION: ICD-10-CM

## 2025-05-02 DIAGNOSIS — J47.0 BRONCHIECTASIS WITH ACUTE LOWER RESPIRATORY INFECTION (HCC): Primary | ICD-10-CM

## 2025-05-02 RX ORDER — CEFUROXIME AXETIL 250 MG/1
250 TABLET ORAL 2 TIMES DAILY
Qty: 20 TABLET | Refills: 0 | Status: SHIPPED | OUTPATIENT
Start: 2025-05-02 | End: 2025-05-12

## 2025-05-02 RX ORDER — TRIAMCINOLONE ACETONIDE 40 MG/ML
40 INJECTION, SUSPENSION INTRA-ARTICULAR; INTRAMUSCULAR ONCE
Status: COMPLETED | OUTPATIENT
Start: 2025-05-02 | End: 2025-05-02

## 2025-05-02 RX ORDER — PREDNISONE 10 MG/1
10 TABLET ORAL 2 TIMES DAILY
Qty: 10 TABLET | Refills: 0 | Status: SHIPPED | OUTPATIENT
Start: 2025-05-02 | End: 2025-05-07

## 2025-05-02 RX ORDER — ALBUTEROL SULFATE 0.83 MG/ML
2.5 SOLUTION RESPIRATORY (INHALATION) EVERY 6 HOURS PRN
Qty: 120 EACH | Refills: 5 | Status: SHIPPED | OUTPATIENT
Start: 2025-05-02

## 2025-05-02 RX ADMIN — TRIAMCINOLONE ACETONIDE 40 MG: 40 INJECTION, SUSPENSION INTRA-ARTICULAR; INTRAMUSCULAR at 16:11

## 2025-05-02 NOTE — PROGRESS NOTES
After obtaining consent, and per orders of Dr. Connelly, injection of kenalog-40 given in left ventrogluteal by Antony Hood MA. ABN was signed prior to injection, copy was given to the patient. Patient tolerated the injection well.  
08/25/2017         HYSTERECTOMY (CERVIX STATUS UNKNOWN)      HYSTERECTOMY, TOTAL ABDOMINAL (CERVIX REMOVED)      SKIN CANCER EXCISION Left 08/25/2015    WIDE EVCISION MELANOMA WITH FLAP CLOSURE    TONSILLECTOMY      VENA CAVA FILTER PLACEMENT  08/18/2017    [place] Filter        Social History     Socioeconomic History    Marital status:    Tobacco Use    Smoking status: Never     Passive exposure: Yes    Smokeless tobacco: Never   Vaping Use    Vaping status: Never Used   Substance and Sexual Activity    Alcohol use: Not Currently     Alcohol/week: 0.0 standard drinks of alcohol     Comment: occasionally    Drug use: No     Social Drivers of Health     Financial Resource Strain: Low Risk  (10/29/2024)    Overall Financial Resource Strain (CARDIA)     Difficulty of Paying Living Expenses: Not hard at all   Food Insecurity: No Food Insecurity (1/9/2025)    Received from [facility]    Hunger Screening     Within the past 12 months we worried whether our food would run out before we got money to buy more.: Never True     Within the past 12 months the food we bought just didn't last and we didn't have money to get more.: Never True   Transportation Needs: No Transportation Needs (12/8/2024)    PRAPARE - Transportation     Lack of Transportation (Medical): No     Lack of Transportation (Non-Medical): No   Physical Activity: Inactive (7/10/2024)    Exercise Vital Sign     Days of Exercise per Week: 0 days     Minutes of Exercise per Session: 0 min    [facility]    [place]   Housing Stability: Low Risk  (12/8/2024)    Housing Stability Vital Sign     Unable to Pay for Housing in the Last Year: No     Number of Times Moved in the Last Year: 0     Homeless in the Last Year: No        PHYSICAL EXAM     /72   Pulse 82   Temp 97.9 °F (36.6 °C)   Resp 12   Ht 1.651 m (5' 5\")   Wt 64.4 kg (142 lb)   SpO2 98%   BMI 23.63

## 2025-05-05 PROBLEM — Z88.6 ALLERGY STATUS TO ANALGESIC AGENT: Status: RESOLVED | Noted: 2018-04-06 | Resolved: 2025-05-05

## 2025-05-05 PROBLEM — S00.83XA TRAUMATIC HEMATOMA OF FOREHEAD: Status: RESOLVED | Noted: 2018-01-07 | Resolved: 2025-05-05

## 2025-05-05 PROBLEM — M79.605 PAIN IN LEFT LEG: Status: RESOLVED | Noted: 2020-05-21 | Resolved: 2025-05-05

## 2025-05-05 PROBLEM — J30.2 SEASONAL ALLERGIES: Status: RESOLVED | Noted: 2022-09-13 | Resolved: 2025-05-05

## 2025-05-05 PROBLEM — R91.8 MULTIPLE NODULES OF LUNG: Status: RESOLVED | Noted: 2020-05-21 | Resolved: 2025-05-05

## 2025-05-05 PROBLEM — R00.1 BRADYCARDIA: Status: RESOLVED | Noted: 2024-03-24 | Resolved: 2025-05-05

## 2025-05-05 PROBLEM — M10.9 GOUT: Status: RESOLVED | Noted: 2018-04-06 | Resolved: 2025-05-05

## 2025-05-05 PROBLEM — J20.9 BRONCHITIS WITH ASTHMA, ACUTE: Status: RESOLVED | Noted: 2024-12-08 | Resolved: 2025-05-05

## 2025-05-05 PROBLEM — H61.91 SKIN LESION OF RIGHT EAR: Status: RESOLVED | Noted: 2024-07-25 | Resolved: 2025-05-05

## 2025-05-05 PROBLEM — R19.7 DIARRHEA, UNSPECIFIED: Status: RESOLVED | Noted: 2023-07-17 | Resolved: 2025-05-05

## 2025-05-05 PROBLEM — U07.1 COVID-19: Status: RESOLVED | Noted: 2022-09-13 | Resolved: 2025-05-05

## 2025-05-05 PROBLEM — B37.0 CANDIDA, ORAL: Status: RESOLVED | Noted: 2017-09-02 | Resolved: 2025-05-05

## 2025-05-05 PROBLEM — Z79.84 LONG TERM (CURRENT) USE OF ORAL HYPOGLYCEMIC DRUGS: Status: RESOLVED | Noted: 2018-04-06 | Resolved: 2025-05-05

## 2025-05-05 PROBLEM — K57.92 DIVERTICULITIS OF INTESTINE: Status: RESOLVED | Noted: 2018-04-06 | Resolved: 2025-05-05

## 2025-05-05 PROBLEM — R55 SYNCOPE AND COLLAPSE: Status: RESOLVED | Noted: 2024-03-24 | Resolved: 2025-05-05

## 2025-05-05 PROBLEM — E53.8 COBALAMIN DEFICIENCY: Status: RESOLVED | Noted: 2020-05-21 | Resolved: 2025-05-05

## 2025-05-05 PROBLEM — H93.13 BILATERAL TINNITUS: Status: RESOLVED | Noted: 2020-05-21 | Resolved: 2025-05-05

## 2025-05-05 PROBLEM — Z86.711 HISTORY OF PULMONARY EMBOLISM: Status: RESOLVED | Noted: 2018-04-06 | Resolved: 2025-05-05

## 2025-05-05 PROBLEM — R41.82 ALTERED MENTAL STATUS: Status: RESOLVED | Noted: 2018-04-06 | Resolved: 2025-05-05

## 2025-05-05 PROBLEM — R00.2 PALPITATIONS: Status: RESOLVED | Noted: 2020-05-21 | Resolved: 2025-05-05

## 2025-05-05 PROBLEM — J18.9 COMMUNITY ACQUIRED PNEUMONIA: Status: RESOLVED | Noted: 2024-12-11 | Resolved: 2025-05-05

## 2025-05-05 PROBLEM — Z98.890 HISTORY OF CRANIOTOMY: Status: RESOLVED | Noted: 2020-05-21 | Resolved: 2025-05-05

## 2025-05-05 PROBLEM — L02.214 GROIN ABSCESS: Status: RESOLVED | Noted: 2017-12-22 | Resolved: 2025-05-05

## 2025-05-05 PROBLEM — L98.9 DISORDER OF THE SKIN AND SUBCUTANEOUS TISSUE, UNSPECIFIED: Status: RESOLVED | Noted: 2020-05-21 | Resolved: 2025-05-05

## 2025-05-05 PROBLEM — Z86.79 HISTORY OF SUBDURAL HEMATOMA: Status: RESOLVED | Noted: 2020-05-21 | Resolved: 2025-05-05

## 2025-05-05 PROBLEM — D68.69 ACQUIRED HYPERCOAGULABLE STATE: Status: RESOLVED | Noted: 2022-09-13 | Resolved: 2025-05-05

## 2025-05-05 PROBLEM — R26.9 ABNORMAL GAIT: Status: RESOLVED | Noted: 2022-09-13 | Resolved: 2025-05-05

## 2025-05-05 PROBLEM — R42 VERTIGO: Status: RESOLVED | Noted: 2021-02-10 | Resolved: 2025-05-05

## 2025-05-05 PROBLEM — J45.31 MILD PERSISTENT ASTHMA WITH (ACUTE) EXACERBATION: Status: RESOLVED | Noted: 2021-10-21 | Resolved: 2025-05-05

## 2025-05-05 PROBLEM — I31.39 PERICARDIAL EFFUSION: Status: RESOLVED | Noted: 2017-08-25 | Resolved: 2025-05-05

## 2025-05-05 PROBLEM — J45.909 BRONCHITIS WITH ASTHMA, ACUTE: Status: RESOLVED | Noted: 2024-12-08 | Resolved: 2025-05-05

## 2025-05-05 PROBLEM — S22.31XA RIGHT RIB FRACTURE: Status: RESOLVED | Noted: 2023-01-01 | Resolved: 2025-05-05

## 2025-05-05 PROBLEM — G57.90 MONONEUROPATHY OF LOWER EXTREMITY: Status: RESOLVED | Noted: 2020-05-21 | Resolved: 2025-05-05

## 2025-05-05 PROBLEM — T38.0X5A STEROID-INDUCED HYPERGLYCEMIA: Status: RESOLVED | Noted: 2024-12-11 | Resolved: 2025-05-05

## 2025-05-05 PROBLEM — R07.89 OTHER CHEST PAIN: Status: RESOLVED | Noted: 2022-12-16 | Resolved: 2025-05-05

## 2025-05-05 PROBLEM — D72.829 LEUKOCYTOSIS: Status: RESOLVED | Noted: 2022-09-13 | Resolved: 2025-05-05

## 2025-05-05 PROBLEM — R32 URINARY INCONTINENCE: Status: RESOLVED | Noted: 2023-02-17 | Resolved: 2025-05-05

## 2025-05-05 PROBLEM — N95.2 ATROPHY OF VAGINA: Status: RESOLVED | Noted: 2021-02-10 | Resolved: 2025-05-05

## 2025-05-05 PROBLEM — L89.629 DECUBITUS ULCER OF LEFT HEEL: Status: RESOLVED | Noted: 2017-12-22 | Resolved: 2025-05-05

## 2025-05-05 PROBLEM — Z86.718 PERSONAL HISTORY OF OTHER VENOUS THROMBOSIS AND EMBOLISM: Status: RESOLVED | Noted: 2018-04-06 | Resolved: 2025-05-05

## 2025-05-05 PROBLEM — I16.0 HYPERTENSIVE URGENCY: Status: RESOLVED | Noted: 2023-01-01 | Resolved: 2025-05-05

## 2025-05-05 PROBLEM — A04.72 CLOSTRIDIUM ENTEROCOLITIS: Status: RESOLVED | Noted: 2020-05-21 | Resolved: 2025-05-05

## 2025-05-05 PROBLEM — G45.9 TRANSIENT ISCHEMIC ATTACK: Status: RESOLVED | Noted: 2021-02-10 | Resolved: 2025-05-05

## 2025-05-05 PROBLEM — R73.9 STEROID-INDUCED HYPERGLYCEMIA: Status: RESOLVED | Noted: 2024-12-11 | Resolved: 2025-05-05

## 2025-05-05 PROBLEM — F32.9 REACTIVE DEPRESSION (SITUATIONAL): Status: RESOLVED | Noted: 2021-02-10 | Resolved: 2025-05-05

## 2025-05-05 PROBLEM — Z92.29 HISTORY OF COUMADIN THERAPY: Status: RESOLVED | Noted: 2023-10-05 | Resolved: 2025-05-05

## 2025-05-05 PROBLEM — R06.09 OTHER FORMS OF DYSPNEA: Status: RESOLVED | Noted: 2022-12-16 | Resolved: 2025-05-05

## 2025-05-05 PROBLEM — Z86.73 HISTORY OF STROKE: Status: RESOLVED | Noted: 2025-04-09 | Resolved: 2025-05-05

## 2025-05-05 PROBLEM — Z85.820 HISTORY OF MALIGNANT MELANOMA: Status: RESOLVED | Noted: 2020-05-21 | Resolved: 2025-05-05

## 2025-05-05 PROBLEM — S01.412D: Status: RESOLVED | Noted: 2023-01-01 | Resolved: 2025-05-05

## 2025-05-05 PROBLEM — G93.41 METABOLIC ENCEPHALOPATHY: Status: RESOLVED | Noted: 2018-04-06 | Resolved: 2025-05-05

## 2025-05-05 PROBLEM — J40 BRONCHITIS: Status: RESOLVED | Noted: 2023-10-05 | Resolved: 2025-05-05

## 2025-05-05 PROBLEM — J06.9 VIRAL URI: Status: RESOLVED | Noted: 2025-04-09 | Resolved: 2025-05-05

## 2025-05-05 PROBLEM — N60.19 FIBROCYSTIC BREAST CHANGES: Status: RESOLVED | Noted: 2020-05-21 | Resolved: 2025-05-05

## 2025-05-05 PROBLEM — R51.9 HEADACHE: Status: RESOLVED | Noted: 2020-05-21 | Resolved: 2025-05-05

## 2025-05-05 PROBLEM — H91.93 DECREASED HEARING OF BOTH EARS: Status: RESOLVED | Noted: 2020-05-21 | Resolved: 2025-05-05

## 2025-05-15 DIAGNOSIS — I25.10 ATHEROSCLEROSIS OF NATIVE CORONARY ARTERY OF NATIVE HEART WITHOUT ANGINA PECTORIS: ICD-10-CM

## 2025-05-15 RX ORDER — CLOPIDOGREL BISULFATE 75 MG/1
75 TABLET ORAL DAILY
Qty: 90 TABLET | Refills: 2 | Status: SHIPPED | OUTPATIENT
Start: 2025-05-15

## 2025-05-15 NOTE — TELEPHONE ENCOUNTER
Manda Doll is calling to request a refill on the following medication(s):    Medication Request:  Requested Prescriptions     Pending Prescriptions Disp Refills    clopidogrel (PLAVIX) 75 MG tablet [Pharmacy Med Name: CLOPIDOGREL 75 MG TABLET] 90 tablet 2     Sig: TAKE 1 TABLET BY MOUTH DAILY       Last Visit Date (If Applicable):  5/2/2025    Next Visit Date:    5/22/2025

## 2025-05-23 ENCOUNTER — OFFICE VISIT (OUTPATIENT)
Age: 85
End: 2025-05-23

## 2025-05-23 VITALS
TEMPERATURE: 97.9 F | HEART RATE: 68 BPM | BODY MASS INDEX: 23.19 KG/M2 | HEIGHT: 65 IN | OXYGEN SATURATION: 97 % | DIASTOLIC BLOOD PRESSURE: 80 MMHG | SYSTOLIC BLOOD PRESSURE: 160 MMHG | WEIGHT: 139.2 LBS

## 2025-05-23 DIAGNOSIS — B37.0 THRUSH: ICD-10-CM

## 2025-05-23 DIAGNOSIS — R05.2 SUBACUTE COUGH: Primary | ICD-10-CM

## 2025-05-23 DIAGNOSIS — I10 PRIMARY HYPERTENSION: ICD-10-CM

## 2025-05-23 DIAGNOSIS — J47.9 BRONCHIECTASIS WITHOUT COMPLICATION (HCC): ICD-10-CM

## 2025-05-23 DIAGNOSIS — R49.0 HOARSE VOICE QUALITY: ICD-10-CM

## 2025-05-23 RX ORDER — NYSTATIN 100000 [USP'U]/ML
500000 SUSPENSION ORAL 4 TIMES DAILY
Qty: 280 ML | Refills: 0 | Status: SHIPPED | OUTPATIENT
Start: 2025-05-23 | End: 2025-06-06

## 2025-05-23 SDOH — ECONOMIC STABILITY: FOOD INSECURITY: WITHIN THE PAST 12 MONTHS, THE FOOD YOU BOUGHT JUST DIDN'T LAST AND YOU DIDN'T HAVE MONEY TO GET MORE.: NEVER TRUE

## 2025-05-23 SDOH — ECONOMIC STABILITY: FOOD INSECURITY: WITHIN THE PAST 12 MONTHS, YOU WORRIED THAT YOUR FOOD WOULD RUN OUT BEFORE YOU GOT MONEY TO BUY MORE.: NEVER TRUE

## 2025-05-23 ASSESSMENT — ENCOUNTER SYMPTOMS
SORE THROAT: 0
SHORTNESS OF BREATH: 0
DIARRHEA: 0
CHEST TIGHTNESS: 0
NAUSEA: 0
RHINORRHEA: 0
VOMITING: 0
CONSTIPATION: 0

## 2025-05-23 ASSESSMENT — PATIENT HEALTH QUESTIONNAIRE - PHQ9
SUM OF ALL RESPONSES TO PHQ QUESTIONS 1-9: 0
2. FEELING DOWN, DEPRESSED OR HOPELESS: NOT AT ALL
SUM OF ALL RESPONSES TO PHQ QUESTIONS 1-9: 0
1. LITTLE INTEREST OR PLEASURE IN DOING THINGS: NOT AT ALL
SUM OF ALL RESPONSES TO PHQ QUESTIONS 1-9: 0
SUM OF ALL RESPONSES TO PHQ QUESTIONS 1-9: 0

## 2025-05-23 NOTE — PROGRESS NOTES
Manda Doll (:  1940) is a 85 y.o. female, Established patient, here for evaluation of the following chief complaint(s):  Health Maintenance (Patient has bronchitis and pneumonia and is here for a follow up )         Assessment & Plan  1. Pneumonia.  - Condition has shown significant improvement since the previous month, with a notable decrease in coughing frequency.  - Oxygen saturation levels are within normal limits.  - A chest x-ray is not deemed necessary at this time.  - Will continue current treatment regimen. If cough persists into the following week, a chest x-ray may be considered.    2.  Bronchiectasis.  - Reports improvement in cough since 2025.  - No new symptoms such as fever or shortness of breath are present.  - Will continue using albuterol inhaler as needed.  - Will continue to follow-up with pulmonology.    3. Hypertension.  - Blood pressure readings have been fluctuating, with a recent reading of 159 mmHg this morning and lower readings around 114-117 mmHg over the past three days.  Systolic.  - Currently taking labetalol 1 tablet in the morning and 1 tablet at night.  - Advised to consult with cardiologist regarding the appropriateness of labetalol for her age group and potential adjustments to medication dosage. Instructed to abstain from taking labetalol if blood pressure drops to the 90s. In the event of lightheadedness, should elevate legs while lying on the floor.  - Encouraged to continue monitoring blood pressure at home and to contact us if it fluctuates over the weekend.  - She will call the office or go to the ER for any new or concerning issues.  - no changes will be made to her prescription drugs today but she will keep a close eye on the numbers and call us if needed over the weekend and next week to update us on the BP values.     4. Hoarseness.  - Referral to an ENT specialist has been provided for further evaluation of hoarseness.    5. Thrush.  -

## 2025-06-12 RX ORDER — LABETALOL 100 MG/1
200 TABLET, FILM COATED ORAL 2 TIMES DAILY
Qty: 120 TABLET | Refills: 5 | Status: SHIPPED | OUTPATIENT
Start: 2025-06-12

## 2025-06-12 NOTE — TELEPHONE ENCOUNTER
Manda Doll is calling to request a refill on the following medication(s):    Medication Request:  Requested Prescriptions     Pending Prescriptions Disp Refills    labetalol (NORMODYNE) 100 MG tablet [Pharmacy Med Name: LABETALOL  MG TABLET] 120 tablet 5     Sig: TAKE 2 TABLETS BY MOUTH TWICE A DAY       Last Visit Date (If Applicable):  5/23/2025    Next Visit Date:    6/24/2025

## 2025-06-17 ENCOUNTER — TELEPHONE (OUTPATIENT)
Age: 85
End: 2025-06-17

## 2025-06-17 RX ORDER — BUDESONIDE AND FORMOTEROL FUMARATE DIHYDRATE 160; 4.5 UG/1; UG/1
2 AEROSOL RESPIRATORY (INHALATION) 2 TIMES DAILY
Qty: 10.2 G | Refills: 3 | Status: SHIPPED | OUTPATIENT
Start: 2025-06-17

## 2025-06-17 NOTE — TELEPHONE ENCOUNTER
Manda Doll is calling to request a refill on the following medication(s):    Medication Request:  Requested Prescriptions     Pending Prescriptions Disp Refills    budesonide-formoterol (SYMBICORT) 160-4.5 MCG/ACT AERO 10.2 g 3     Sig: Inhale 2 puffs into the lungs 2 times daily       Last Visit Date (If Applicable):  5/23/2025    Next Visit Date:    6/24/2025

## 2025-06-17 NOTE — TELEPHONE ENCOUNTER
[facility]   Deidra campa

## 2025-06-23 NOTE — PATIENT INSTRUCTIONS
Manda    Thank you for choosing [facility].  We know you have options when it comes to your healthcare; we appreciate that you chose us. Our goal is to provide exceptional  service and world class care to every patient.  You will be receiving a survey via email or text message asking for your feedback.  Please take a few minutes to share your thoughts about your recent visit. Your comments help us understand what we do well and ways we can improve.  Thank you in advance for your valuable feedback.      Dr. MARJORIE Connelly MD        Learning About Stress  What is stress?     Stress is your body's response to a hard situation. Your body can have a physical, emotional, or mental response. Stress is a fact of life for most people, and it affects everyone differently. What causes stress for you may not be stressful for someone else.  A lot of things can cause stress. You may feel stress when you go on a job interview, take a test, or run a race. This kind of short-term stress is normal and even useful. It can help you if you need to work hard or react quickly. For example, stress can help you finish an important job on time.  Long-term stress is caused by ongoing stressful situations or events. Examples of long-term stress include long-term health problems, ongoing problems at work, or conflicts in your family. Long-term stress can harm your health.  How does stress affect your health?  When you are stressed, your body responds as though you are in danger. It makes hormones that speed up your heart, make you breathe faster, and give you a burst of energy. This is called the fight-or-flight stress response. If the stress is over quickly, your body goes back to normal and no harm is done.  But if stress happens too often or lasts too long, it can have bad effects. Long-term stress can make you more likely to get sick, and it can make symptoms of some diseases worse. If you tense up when you are stressed, you

## 2025-06-24 ENCOUNTER — OFFICE VISIT (OUTPATIENT)
Age: 85
End: 2025-06-24

## 2025-06-24 VITALS
HEART RATE: 63 BPM | WEIGHT: 142.2 LBS | BODY MASS INDEX: 24.28 KG/M2 | HEIGHT: 64 IN | OXYGEN SATURATION: 98 % | TEMPERATURE: 97.9 F | SYSTOLIC BLOOD PRESSURE: 146 MMHG | DIASTOLIC BLOOD PRESSURE: 90 MMHG

## 2025-06-24 DIAGNOSIS — Z00.00 MEDICARE ANNUAL WELLNESS VISIT, SUBSEQUENT: Primary | ICD-10-CM

## 2025-06-24 DIAGNOSIS — J47.0 BRONCHIECTASIS WITH ACUTE LOWER RESPIRATORY INFECTION (HCC): ICD-10-CM

## 2025-06-24 DIAGNOSIS — I10 PRIMARY HYPERTENSION: ICD-10-CM

## 2025-06-24 DIAGNOSIS — E78.2 MIXED HYPERLIPIDEMIA: ICD-10-CM

## 2025-06-24 RX ORDER — ATORVASTATIN CALCIUM 20 MG/1
TABLET, FILM COATED ORAL
Qty: 30 TABLET | Refills: 3
Start: 2025-06-24

## 2025-06-24 RX ORDER — CEPHALEXIN 500 MG/1
500 CAPSULE ORAL 4 TIMES DAILY
COMMUNITY
Start: 2025-06-19

## 2025-06-24 ASSESSMENT — PATIENT HEALTH QUESTIONNAIRE - PHQ9
SUM OF ALL RESPONSES TO PHQ QUESTIONS 1-9: 0
1. LITTLE INTEREST OR PLEASURE IN DOING THINGS: NOT AT ALL
SUM OF ALL RESPONSES TO PHQ QUESTIONS 1-9: 0
2. FEELING DOWN, DEPRESSED OR HOPELESS: NOT AT ALL

## 2025-06-24 NOTE — PROGRESS NOTES
Medicare Annual Wellness Visit    Manda Doll is here for Medicare AWV (Here for AWV.  Subsequent Visit.  Care Teams reviewed.)    Assessment & Plan   Medicare annual wellness visit, subsequent     Return in 3 months (on 9/24/2025).  Patient will continue same medication she thinks that Lipitor 4 times a week is causing more dizziness she will cut back to twice a week and monitor her side effects.  Return here in 3 months with appropriate lab.  Briefly discussed the patient will need repeat CT scan in the fall she is hesitant as she does not think that she would want to have any workup done if it was suspicious on next imaging study       Subjective       Patient's complete Health Risk Assessment and screening values have been reviewed and are found in Flowsheets. The following problems were reviewed today and where indicated follow up appointments were made and/or referrals ordered.    Positive Risk Factor Screenings with Interventions:              Inactivity:  On average, how many days per week do you engage in moderate to strenuous exercise (like a brisk walk)?: 0 days (!) Abnormal  On average, how many minutes do you engage in exercise at this level?: 0 min  Interventions:  Patient is as active as her medical conditions will allow                         Objective   Vitals:    06/24/25 1313   BP: (!) 146/90   Pulse: 63   Temp: 97.9 °F (36.6 °C)   SpO2: 98%   Weight: 64.5 kg (142 lb 3.2 oz)   Height: 1.632 m (5' 4.25\")      Body mass index is 24.22 kg/m².      Home blood pressures wax and wane sometimes she will have systolics in the 80 other times systolics in the 170s.  No syncopal episodes.  She will take labetalol when her systolic stays over 170.  This does not happen on a regular basis hypotensive episodes are more her enemy than transient elevated systolic blood pressure.  On average her blood pressure at home is running 140s over 80s.  Neck no masses trachea midline.  Lungs are totally clear on

## 2025-07-31 ENCOUNTER — TELEPHONE (OUTPATIENT)
Age: 85
End: 2025-07-31

## 2025-07-31 DIAGNOSIS — R05.2 SUBACUTE COUGH: Primary | ICD-10-CM

## 2025-07-31 DIAGNOSIS — R49.0 HOARSE VOICE QUALITY: ICD-10-CM

## 2025-07-31 NOTE — TELEPHONE ENCOUNTER
Patient needs new referral to another ENT  cause Dr rubio is only seeing pts under 18.Patient would like referral to Dr Sammy broderick ENT in [place]  notify pt when referral is in

## 2025-08-01 ENCOUNTER — OFFICE VISIT (OUTPATIENT)
Age: 85
End: 2025-08-01

## 2025-08-01 VITALS
BODY MASS INDEX: 24.24 KG/M2 | SYSTOLIC BLOOD PRESSURE: 138 MMHG | OXYGEN SATURATION: 98 % | DIASTOLIC BLOOD PRESSURE: 70 MMHG | HEART RATE: 62 BPM | WEIGHT: 142 LBS | TEMPERATURE: 98 F | HEIGHT: 64 IN

## 2025-08-01 DIAGNOSIS — R49.0 HOARSE VOICE QUALITY: Primary | ICD-10-CM

## 2025-08-01 DIAGNOSIS — I10 PRIMARY HYPERTENSION: ICD-10-CM

## 2025-08-01 RX ORDER — CLOTRIMAZOLE AND BETAMETHASONE DIPROPIONATE 10; .64 MG/G; MG/G
CREAM TOPICAL
Qty: 15 G | Refills: 1 | Status: SHIPPED | OUTPATIENT
Start: 2025-08-01

## 2025-08-01 SDOH — ECONOMIC STABILITY: FOOD INSECURITY: WITHIN THE PAST 12 MONTHS, THE FOOD YOU BOUGHT JUST DIDN'T LAST AND YOU DIDN'T HAVE MONEY TO GET MORE.: NEVER TRUE

## 2025-08-01 SDOH — ECONOMIC STABILITY: FOOD INSECURITY: WITHIN THE PAST 12 MONTHS, YOU WORRIED THAT YOUR FOOD WOULD RUN OUT BEFORE YOU GOT MONEY TO BUY MORE.: NEVER TRUE

## 2025-08-01 ASSESSMENT — PATIENT HEALTH QUESTIONNAIRE - PHQ9
SUM OF ALL RESPONSES TO PHQ QUESTIONS 1-9: 0
1. LITTLE INTEREST OR PLEASURE IN DOING THINGS: NOT AT ALL
SUM OF ALL RESPONSES TO PHQ QUESTIONS 1-9: 0
SUM OF ALL RESPONSES TO PHQ QUESTIONS 1-9: 0
2. FEELING DOWN, DEPRESSED OR HOPELESS: NOT AT ALL
SUM OF ALL RESPONSES TO PHQ QUESTIONS 1-9: 0

## 2025-08-01 NOTE — PATIENT INSTRUCTIONS
Manda    Thank you for choosing [facility].  We know you have options when it comes to your healthcare; we appreciate that you chose us. Our goal is to provide exceptional service and world class care to every patient.  You will be receiving a survey via email or text message asking for your feedback.  Please take a few minutes to share your thoughts about your recent visit. Your comments helps us understand what we do well and ways we can improve.    We thank you in advance for your valuable feedback      Dr. Adrian Connelly and BRENNAN Courtney

## 2025-08-01 NOTE — PROGRESS NOTES
[facility]     Date of Visit:  2025  Patient Name: Manda Doll   Patient :  1940     CHIEF COMPLAINT/HPI:     Manda Doll is a 85 y.o. female who presents today for an general visit to be evaluated for the following condition(s):  Chief Complaint   Patient presents with    Thrush     Possible thrush, redness in the ears      Patient is on Symbicort.  She also has new hearing aids and has some irritation pinna and antihelix of the left ear.  Also complains of some change in voice and soreness of the throat in the morning.  She does have appointment with ENT in the near future  REVIEW OF SYSTEM      Review of Systems  No fever no sweats no chills no chest pain no shortness of breath no nausea no vomiting no diarrhea sore throat in the morning change in voice which may be related to the Symbicort    REVIEWED INFORMATION      Allergies   Allergen Reactions    Influenza Vaccines Anaphylaxis    Potassium Chloride     Amlodipine      Other reaction(s): Unknown    Amoxicillin     Aspirin     Augmentin [Amoxicillin-Pot Clavulanate] Diarrhea    Ciprofibrate     Ciprofloxacin Other (See Comments)    Clavulanic Acid     Cozaar [Losartan Potassium]     Crestor [Rosuvastatin Calcium]     Doxycycline      Other reaction(s): Unknown      Erythromycin     Gabapentin     Lacosamide     Lipitor [Atorvastatin]     Lisinopril Cough     From cardiology notes in [place]      Losartan     Pitavastatin Other (See Comments)    Simvastatin     Tiotropium Bromide-Olodaterol      Other reaction(s): dizziness    Vytorin [Ezetimibe-Simvastatin]     Adhesive Tape     Sulfamethoxazole-Trimethoprim Rash       Current Outpatient Medications   Medication Sig Note Dispense Refill    clotrimazole-betamethasone (LOTRISONE) 1-0.05 % cream Apply topically 2 times daily to left ear  15 g 1    atorvastatin (LIPITOR) 20 MG tablet 1 2x per week  30 tablet 3    budesonide-formoterol (SYMBICORT) 160-4.5 MCG/ACT AERO Inhale 2

## (undated) DEVICE — DRAPE,REIN 53X77,STERILE: Brand: MEDLINE

## (undated) DEVICE — BANDAGE,GAUZE,BULKEE II,4.5"X4.1YD,STRL: Brand: MEDLINE

## (undated) DEVICE — 3M™ TEGADERM™ TRANSPARENT FILM DRESSING FRAME STYLE, 1626W, 4 IN X 4-3/4 IN (10 CM X 12 CM), 50/CT 4CT/CASE: Brand: 3M™ TEGADERM™

## (undated) DEVICE — 3M™ STERI-STRIP™ COMPOUND BENZOIN TINCTURE 40 BAGS/CARTON 4 CARTONS/CASE C1544: Brand: 3M™ STERI-STRIP™

## (undated) DEVICE — PAD,NON-ADHERENT,3X8,STERILE,LF,1/PK: Brand: MEDLINE

## (undated) DEVICE — PAD,ABDOMINAL,5"X9",ST,LF,25/BX: Brand: MEDLINE INDUSTRIES, INC.

## (undated) DEVICE — GOWN,AURORA,NONREINFORCED,LARGE: Brand: MEDLINE

## (undated) DEVICE — SUTURE VCRL SZ 0 L18IN ABSRB VLT L26MM CT-2 1/2 CIR J727D

## (undated) DEVICE — KIT EVAC 400CC DIA1/8IN H PAT 12.5IN 3 SPR RND SHP PVC DRN

## (undated) DEVICE — COVER,MAYO STAND,STERILE: Brand: MEDLINE

## (undated) DEVICE — RADIOPAQUE LINE, SAFE ENTERAL CONNECTIONS: Brand: KANGAROO

## (undated) DEVICE — STERILE LATEX POWDER-FREE SURGICAL GLOVESWITH NITRILE COATING: Brand: PROTEXIS

## (undated) DEVICE — GLOVE SURG SZ 65 THK91MIL LTX FREE SYN POLYISOPRENE

## (undated) DEVICE — DIFFUSER: Brand: CORE, MAESTRO

## (undated) DEVICE — INTRODUCER SHTH PACEMKR 7FRX13CM SFSHTH II

## (undated) DEVICE — KENDALL SCD EXPRESS SLEEVES, KNEE LENGTH, MEDIUM: Brand: KENDALL SCD

## (undated) DEVICE — CODMAN® SURGICAL PATTIES 1/2" X 1/2" (1.27CM X 1.27CM): Brand: CODMAN®

## (undated) DEVICE — CODMAN® SURGICAL PATTIES 1/2" X 3" (1.27CM X 7.62CM): Brand: CODMAN®

## (undated) DEVICE — 3M™ TEGADERM™ TRANSPARENT FILM DRESSING FRAME STYLE, 1624W, 2-3/8 IN X 2-3/4 IN (6 CM X 7 CM), 100/CT 4CT/CASE: Brand: 3M™ TEGADERM™

## (undated) DEVICE — Z DISCONTINUED APPLICATOR SURG PREP 0.35OZ 2% CHG 70% ISO ALC W/ HI LT

## (undated) DEVICE — CONNECTOR TBNG WHT PLAS SUCT STR 5IN1 LTWT W/ M CONN

## (undated) DEVICE — DISPOSABLE IRRIGATION BIPOLAR CORD, M1000 TYPE: Brand: KIRWAN

## (undated) DEVICE — CONVERTED USE 338908 SPONGES LAP 18X18 ST

## (undated) DEVICE — PREP SOL PVP IODINE 4%  4 OZ/BTL

## (undated) DEVICE — CRANIOTOMY DRAPE, STERILE: Brand: MEDLINE

## (undated) DEVICE — DISCONTINUED USE 111569 BF APPLICATOR COTN TIP 6IN ST

## (undated) DEVICE — 2.3MM TAPERED ROUTER

## (undated) DEVICE — OIL CARTRIDGE: Brand: CORE, MAESTRO

## (undated) DEVICE — SMALL SHARP, DUAL PACK: Brand: LINA SKIN HOOK™

## (undated) DEVICE — NEURO SPONGES: Brand: DEROYAL

## (undated) DEVICE — 3M™ STERI-DRAPE™ INCISE DRAPE 1050 (60CM X 45CM): Brand: STERI-DRAPE™

## (undated) DEVICE — SVMMC CRANI PK

## (undated) DEVICE — SUTURE VCRL SZ 3-0 L27IN ABSRB UD L26MM SH 1/2 CIR J416H

## (undated) DEVICE — CLAMP SCALP STR EDGE HVY

## (undated) DEVICE — 7.5MM ACORN

## (undated) DEVICE — PAD,EYE,1-5/8X2 5/8,STERILE,LF,1/PK: Brand: MEDLINE

## (undated) DEVICE — FILTER CLP DISP FOR 5513E CLIPVAC

## (undated) DEVICE — ELECTRODE ES L3IN S STL BLDE INSUL DISP VALLEYLAB EDGE

## (undated) DEVICE — GAUZE,SPONGE,FLUFF,6"X6.75",STRL,5/TRAY: Brand: MEDLINE

## (undated) DEVICE — Device